# Patient Record
Sex: FEMALE | Race: WHITE | NOT HISPANIC OR LATINO | Employment: FULL TIME | URBAN - METROPOLITAN AREA
[De-identification: names, ages, dates, MRNs, and addresses within clinical notes are randomized per-mention and may not be internally consistent; named-entity substitution may affect disease eponyms.]

---

## 2017-10-29 ENCOUNTER — APPOINTMENT (OUTPATIENT)
Dept: MRI IMAGING | Facility: HOSPITAL | Age: 42
End: 2017-10-29

## 2017-10-29 ENCOUNTER — APPOINTMENT (OUTPATIENT)
Dept: GENERAL RADIOLOGY | Facility: HOSPITAL | Age: 42
End: 2017-10-29

## 2017-10-29 ENCOUNTER — HOSPITAL ENCOUNTER (INPATIENT)
Facility: HOSPITAL | Age: 42
LOS: 5 days | Discharge: HOME | End: 2017-11-03
Attending: FAMILY MEDICINE | Admitting: INTERNAL MEDICINE

## 2017-10-29 ENCOUNTER — APPOINTMENT (OUTPATIENT)
Dept: CARDIOLOGY | Facility: HOSPITAL | Age: 42
End: 2017-10-29
Attending: PSYCHIATRY & NEUROLOGY

## 2017-10-29 ENCOUNTER — APPOINTMENT (OUTPATIENT)
Dept: CT IMAGING | Facility: HOSPITAL | Age: 42
End: 2017-10-29

## 2017-10-29 DIAGNOSIS — I63.9 ACUTE CVA (CEREBROVASCULAR ACCIDENT) (HCC): ICD-10-CM

## 2017-10-29 DIAGNOSIS — I67.1 INTRACRANIAL ANEURYSM: Primary | ICD-10-CM

## 2017-10-29 PROBLEM — I63.512 LEFT MIDDLE CEREBRAL ARTERY STROKE: Status: ACTIVE | Noted: 2017-10-29

## 2017-10-29 LAB
ABO GROUP BLD: NORMAL
ALBUMIN SERPL-MCNC: 4.5 G/DL (ref 3.5–5.2)
ALBUMIN/GLOB SERPL: 1.6 G/DL
ALP SERPL-CCNC: 40 U/L (ref 39–117)
ALT SERPL W P-5'-P-CCNC: 10 U/L (ref 1–33)
ANION GAP SERPL CALCULATED.3IONS-SCNC: 14 MMOL/L
ANISOCYTOSIS BLD QL: NORMAL
APTT PPP: 23.4 SECONDS (ref 22.7–35.4)
AST SERPL-CCNC: 17 U/L (ref 1–32)
BASOPHILS # BLD AUTO: 0.03 10*3/MM3 (ref 0–0.2)
BASOPHILS NFR BLD AUTO: 0.4 % (ref 0–1.5)
BILIRUB SERPL-MCNC: 0.5 MG/DL (ref 0.1–1.2)
BLD GP AB SCN SERPL QL: NEGATIVE
BUN BLD-MCNC: 9 MG/DL (ref 6–20)
BUN/CREAT SERPL: 13 (ref 7–25)
CALCIUM SPEC-SCNC: 9.3 MG/DL (ref 8.6–10.5)
CHLORIDE SERPL-SCNC: 103 MMOL/L (ref 98–107)
CHOLEST SERPL-MCNC: 179 MG/DL (ref 0–200)
CO2 SERPL-SCNC: 21 MMOL/L (ref 22–29)
CREAT BLD-MCNC: 0.69 MG/DL (ref 0.57–1)
DEPRECATED RDW RBC AUTO: 47.8 FL (ref 37–54)
ELLIPTOCYTES BLD QL SMEAR: NORMAL
EOSINOPHIL # BLD AUTO: 0.05 10*3/MM3 (ref 0–0.7)
EOSINOPHIL NFR BLD AUTO: 0.7 % (ref 0.3–6.2)
ERYTHROCYTE [DISTWIDTH] IN BLOOD BY AUTOMATED COUNT: 18.1 % (ref 11.7–13)
GFR SERPL CREATININE-BSD FRML MDRD: 93 ML/MIN/1.73
GLOBULIN UR ELPH-MCNC: 2.8 GM/DL
GLUCOSE BLD-MCNC: 113 MG/DL (ref 65–99)
GLUCOSE BLDC GLUCOMTR-MCNC: 86 MG/DL (ref 70–130)
GLUCOSE BLDC GLUCOMTR-MCNC: 96 MG/DL (ref 70–130)
HBA1C MFR BLD: 4.47 % (ref 4.8–5.6)
HCT VFR BLD AUTO: 30.9 % (ref 35.6–45.5)
HDLC SERPL-MCNC: 94 MG/DL (ref 40–60)
HGB BLD-MCNC: 8.9 G/DL (ref 11.9–15.5)
HOLD SPECIMEN: NORMAL
HOLD SPECIMEN: NORMAL
HYPOCHROMIA BLD QL: NORMAL
IMM GRANULOCYTES # BLD: 0 10*3/MM3 (ref 0–0.03)
IMM GRANULOCYTES NFR BLD: 0 % (ref 0–0.5)
INR PPP: 1.06 (ref 0.9–1.1)
LARGE PLATELETS: NORMAL
LDLC SERPL CALC-MCNC: 72 MG/DL (ref 0–100)
LDLC/HDLC SERPL: 0.77 {RATIO}
LYMPHOCYTES # BLD AUTO: 2.23 10*3/MM3 (ref 0.9–4.8)
LYMPHOCYTES NFR BLD AUTO: 31.4 % (ref 19.6–45.3)
MCH RBC QN AUTO: 20.7 PG (ref 26.9–32)
MCHC RBC AUTO-ENTMCNC: 28.8 G/DL (ref 32.4–36.3)
MCV RBC AUTO: 71.9 FL (ref 80.5–98.2)
MONOCYTES # BLD AUTO: 0.61 10*3/MM3 (ref 0.2–1.2)
MONOCYTES NFR BLD AUTO: 8.6 % (ref 5–12)
NEUTROPHILS # BLD AUTO: 4.19 10*3/MM3 (ref 1.9–8.1)
NEUTROPHILS NFR BLD AUTO: 58.9 % (ref 42.7–76)
PLATELET # BLD AUTO: 395 10*3/MM3 (ref 140–500)
PMV BLD AUTO: 10.5 FL (ref 6–12)
POIKILOCYTOSIS BLD QL SMEAR: NORMAL
POTASSIUM BLD-SCNC: 3.5 MMOL/L (ref 3.5–5.2)
PROT SERPL-MCNC: 7.3 G/DL (ref 6–8.5)
PROTHROMBIN TIME: 13.4 SECONDS (ref 11.7–14.2)
RBC # BLD AUTO: 4.3 10*6/MM3 (ref 3.9–5.2)
RH BLD: POSITIVE
SCHISTOCYTES BLD QL SMEAR: NORMAL
SODIUM BLD-SCNC: 138 MMOL/L (ref 136–145)
STOMATOCYTES BLD QL SMEAR: NORMAL
TRIGL SERPL-MCNC: 64 MG/DL (ref 0–150)
TROPONIN T SERPL-MCNC: <0.01 NG/ML (ref 0–0.03)
TSH SERPL DL<=0.05 MIU/L-ACNC: 1.8 MIU/ML (ref 0.27–4.2)
VIT B12 BLD-MCNC: 189 PG/ML (ref 211–946)
VLDLC SERPL-MCNC: 12.8 MG/DL (ref 5–40)
WBC MORPH BLD: NORMAL
WBC NRBC COR # BLD: 7.11 10*3/MM3 (ref 4.5–10.7)
WHOLE BLOOD HOLD SPECIMEN: NORMAL
WHOLE BLOOD HOLD SPECIMEN: NORMAL

## 2017-10-29 PROCEDURE — 0399T ADULT TRANSTHORACIC ECHO COMPLETE W/ CONT IF NECESSARY PER PROTOCOL: CPT | Performed by: INTERNAL MEDICINE

## 2017-10-29 PROCEDURE — A9577 INJ MULTIHANCE: HCPCS | Performed by: INTERNAL MEDICINE

## 2017-10-29 PROCEDURE — 70450 CT HEAD/BRAIN W/O DYE: CPT

## 2017-10-29 PROCEDURE — 0 GADOBENATE DIMEGLUMINE 529 MG/ML SOLUTION: Performed by: INTERNAL MEDICINE

## 2017-10-29 PROCEDURE — 93306 TTE W/DOPPLER COMPLETE: CPT | Performed by: INTERNAL MEDICINE

## 2017-10-29 PROCEDURE — 83036 HEMOGLOBIN GLYCOSYLATED A1C: CPT | Performed by: PSYCHIATRY & NEUROLOGY

## 2017-10-29 PROCEDURE — 93010 ELECTROCARDIOGRAM REPORT: CPT | Performed by: INTERNAL MEDICINE

## 2017-10-29 PROCEDURE — 71010 HC CHEST PA OR AP: CPT

## 2017-10-29 PROCEDURE — 85007 BL SMEAR W/DIFF WBC COUNT: CPT | Performed by: FAMILY MEDICINE

## 2017-10-29 PROCEDURE — 80053 COMPREHEN METABOLIC PANEL: CPT | Performed by: FAMILY MEDICINE

## 2017-10-29 PROCEDURE — 85730 THROMBOPLASTIN TIME PARTIAL: CPT | Performed by: FAMILY MEDICINE

## 2017-10-29 PROCEDURE — 70544 MR ANGIOGRAPHY HEAD W/O DYE: CPT

## 2017-10-29 PROCEDURE — 85025 COMPLETE CBC W/AUTO DIFF WBC: CPT | Performed by: FAMILY MEDICINE

## 2017-10-29 PROCEDURE — 99233 SBSQ HOSP IP/OBS HIGH 50: CPT | Performed by: NEUROLOGICAL SURGERY

## 2017-10-29 PROCEDURE — 82607 VITAMIN B-12: CPT | Performed by: PSYCHIATRY & NEUROLOGY

## 2017-10-29 PROCEDURE — 99284 EMERGENCY DEPT VISIT MOD MDM: CPT

## 2017-10-29 PROCEDURE — 0399T HC MYOCARDL STRAIN IMAG QUAN ASSMT PER SESS: CPT

## 2017-10-29 PROCEDURE — 70553 MRI BRAIN STEM W/O & W/DYE: CPT

## 2017-10-29 PROCEDURE — 82962 GLUCOSE BLOOD TEST: CPT

## 2017-10-29 PROCEDURE — 84484 ASSAY OF TROPONIN QUANT: CPT | Performed by: FAMILY MEDICINE

## 2017-10-29 PROCEDURE — 80061 LIPID PANEL: CPT | Performed by: PSYCHIATRY & NEUROLOGY

## 2017-10-29 PROCEDURE — 93306 TTE W/DOPPLER COMPLETE: CPT

## 2017-10-29 PROCEDURE — 70549 MR ANGIOGRAPH NECK W/O&W/DYE: CPT

## 2017-10-29 PROCEDURE — 25010000002 CYANOCOBALAMIN PER 1000 MCG: Performed by: PSYCHIATRY & NEUROLOGY

## 2017-10-29 PROCEDURE — 86900 BLOOD TYPING SEROLOGIC ABO: CPT | Performed by: FAMILY MEDICINE

## 2017-10-29 PROCEDURE — 99233 SBSQ HOSP IP/OBS HIGH 50: CPT | Performed by: PSYCHIATRY & NEUROLOGY

## 2017-10-29 PROCEDURE — 84443 ASSAY THYROID STIM HORMONE: CPT | Performed by: PSYCHIATRY & NEUROLOGY

## 2017-10-29 PROCEDURE — 85610 PROTHROMBIN TIME: CPT | Performed by: FAMILY MEDICINE

## 2017-10-29 PROCEDURE — 93005 ELECTROCARDIOGRAM TRACING: CPT | Performed by: FAMILY MEDICINE

## 2017-10-29 PROCEDURE — 86901 BLOOD TYPING SEROLOGIC RH(D): CPT | Performed by: FAMILY MEDICINE

## 2017-10-29 PROCEDURE — 25810000003 SODIUM CHLORIDE 0.9 % WITH KCL 20 MEQ 20-0.9 MEQ/L-% SOLUTION: Performed by: PSYCHIATRY & NEUROLOGY

## 2017-10-29 PROCEDURE — 86850 RBC ANTIBODY SCREEN: CPT | Performed by: FAMILY MEDICINE

## 2017-10-29 RX ORDER — ACETAMINOPHEN 325 MG/1
650 TABLET ORAL EVERY 4 HOURS PRN
Status: DISCONTINUED | OUTPATIENT
Start: 2017-10-29 | End: 2017-11-03 | Stop reason: HOSPADM

## 2017-10-29 RX ORDER — CLOPIDOGREL BISULFATE 75 MG/1
75 TABLET ORAL DAILY
Status: DISCONTINUED | OUTPATIENT
Start: 2017-10-30 | End: 2017-11-03

## 2017-10-29 RX ORDER — CYANOCOBALAMIN 1000 UG/ML
1000 INJECTION, SOLUTION INTRAMUSCULAR; SUBCUTANEOUS ONCE
Status: COMPLETED | OUTPATIENT
Start: 2017-10-29 | End: 2017-10-29

## 2017-10-29 RX ORDER — SODIUM CHLORIDE AND POTASSIUM CHLORIDE 150; 900 MG/100ML; MG/100ML
75 INJECTION, SOLUTION INTRAVENOUS CONTINUOUS
Status: DISCONTINUED | OUTPATIENT
Start: 2017-10-29 | End: 2017-10-30

## 2017-10-29 RX ORDER — CHOLECALCIFEROL (VITAMIN D3) 125 MCG
1000 CAPSULE ORAL DAILY
Status: DISCONTINUED | OUTPATIENT
Start: 2017-10-29 | End: 2017-11-03 | Stop reason: HOSPADM

## 2017-10-29 RX ORDER — CETIRIZINE HYDROCHLORIDE 10 MG/1
10 TABLET ORAL DAILY PRN
COMMUNITY

## 2017-10-29 RX ORDER — TOPIRAMATE 25 MG/1
25 TABLET ORAL NIGHTLY
Status: DISCONTINUED | OUTPATIENT
Start: 2017-10-29 | End: 2017-11-03 | Stop reason: HOSPADM

## 2017-10-29 RX ORDER — ASPIRIN 325 MG
TABLET ORAL
Status: COMPLETED
Start: 2017-10-29 | End: 2017-10-29

## 2017-10-29 RX ORDER — CLONAZEPAM 0.5 MG/1
0.5 TABLET ORAL 2 TIMES DAILY PRN
Status: DISCONTINUED | OUTPATIENT
Start: 2017-10-29 | End: 2017-11-03 | Stop reason: HOSPADM

## 2017-10-29 RX ORDER — CLOPIDOGREL BISULFATE 75 MG/1
150 TABLET ORAL ONCE
Status: COMPLETED | OUTPATIENT
Start: 2017-10-29 | End: 2017-10-29

## 2017-10-29 RX ORDER — DOCUSATE SODIUM 100 MG/1
100 CAPSULE, LIQUID FILLED ORAL 2 TIMES DAILY
Status: DISCONTINUED | OUTPATIENT
Start: 2017-10-29 | End: 2017-11-03 | Stop reason: HOSPADM

## 2017-10-29 RX ORDER — ATORVASTATIN CALCIUM 10 MG/1
10 TABLET, FILM COATED ORAL NIGHTLY
Status: DISCONTINUED | OUTPATIENT
Start: 2017-10-29 | End: 2017-10-30

## 2017-10-29 RX ORDER — SODIUM CHLORIDE 0.9 % (FLUSH) 0.9 %
1-10 SYRINGE (ML) INJECTION AS NEEDED
Status: DISCONTINUED | OUTPATIENT
Start: 2017-10-29 | End: 2017-10-30

## 2017-10-29 RX ORDER — ASPIRIN 81 MG/1
81 TABLET, CHEWABLE ORAL DAILY
Status: DISCONTINUED | OUTPATIENT
Start: 2017-10-29 | End: 2017-11-03

## 2017-10-29 RX ORDER — SODIUM CHLORIDE 0.9 % (FLUSH) 0.9 %
1-10 SYRINGE (ML) INJECTION AS NEEDED
Status: DISCONTINUED | OUTPATIENT
Start: 2017-10-29 | End: 2017-11-03 | Stop reason: HOSPADM

## 2017-10-29 RX ORDER — ASPIRIN 325 MG
325 TABLET ORAL ONCE
Status: COMPLETED | OUTPATIENT
Start: 2017-10-29 | End: 2017-10-29

## 2017-10-29 RX ORDER — ACETAMINOPHEN 650 MG/1
650 SUPPOSITORY RECTAL EVERY 4 HOURS PRN
Status: DISCONTINUED | OUTPATIENT
Start: 2017-10-29 | End: 2017-11-03 | Stop reason: HOSPADM

## 2017-10-29 RX ORDER — SODIUM CHLORIDE 0.9 % (FLUSH) 0.9 %
10 SYRINGE (ML) INJECTION AS NEEDED
Status: DISCONTINUED | OUTPATIENT
Start: 2017-10-29 | End: 2017-10-30

## 2017-10-29 RX ORDER — SODIUM CHLORIDE 9 MG/ML
75 INJECTION, SOLUTION INTRAVENOUS CONTINUOUS
Status: DISCONTINUED | OUTPATIENT
Start: 2017-10-29 | End: 2017-10-29

## 2017-10-29 RX ORDER — ONDANSETRON 2 MG/ML
4 INJECTION INTRAMUSCULAR; INTRAVENOUS EVERY 6 HOURS PRN
Status: DISCONTINUED | OUTPATIENT
Start: 2017-10-29 | End: 2017-11-03 | Stop reason: HOSPADM

## 2017-10-29 RX ADMIN — ATORVASTATIN CALCIUM 10 MG: 10 TABLET, FILM COATED ORAL at 22:06

## 2017-10-29 RX ADMIN — TOPIRAMATE 25 MG: 25 TABLET, FILM COATED ORAL at 22:06

## 2017-10-29 RX ADMIN — ASPIRIN 325 MG: 325 TABLET ORAL at 13:07

## 2017-10-29 RX ADMIN — CLONAZEPAM 0.5 MG: 0.5 TABLET ORAL at 22:06

## 2017-10-29 RX ADMIN — CYANOCOBALAMIN TAB 500 MCG 1000 MCG: 500 TAB at 15:43

## 2017-10-29 RX ADMIN — CYANOCOBALAMIN 1000 MCG: 1000 INJECTION, SOLUTION INTRAMUSCULAR at 15:42

## 2017-10-29 RX ADMIN — Medication 325 MG: at 13:07

## 2017-10-29 RX ADMIN — GADOBENATE DIMEGLUMINE 17 ML: 529 INJECTION, SOLUTION INTRAVENOUS at 14:47

## 2017-10-29 RX ADMIN — POTASSIUM CHLORIDE AND SODIUM CHLORIDE 75 ML/HR: 900; 150 INJECTION, SOLUTION INTRAVENOUS at 16:21

## 2017-10-29 RX ADMIN — DOCUSATE SODIUM 100 MG: 100 CAPSULE, LIQUID FILLED ORAL at 22:06

## 2017-10-29 RX ADMIN — CLOPIDOGREL 150 MG: 75 TABLET, FILM COATED ORAL at 17:19

## 2017-10-30 LAB
ANION GAP SERPL CALCULATED.3IONS-SCNC: 13.6 MMOL/L
BH CV ECHO MEAS - ACS: 2.1 CM
BH CV ECHO MEAS - AO MAX PG (FULL): 4.5 MMHG
BH CV ECHO MEAS - AO MAX PG: 10.5 MMHG
BH CV ECHO MEAS - AO MEAN PG (FULL): 4 MMHG
BH CV ECHO MEAS - AO MEAN PG: 6 MMHG
BH CV ECHO MEAS - AO ROOT AREA (BSA CORRECTED): 1.8
BH CV ECHO MEAS - AO ROOT AREA: 9.1 CM^2
BH CV ECHO MEAS - AO ROOT DIAM: 3.4 CM
BH CV ECHO MEAS - AO V2 MAX: 162 CM/SEC
BH CV ECHO MEAS - AO V2 MEAN: 115 CM/SEC
BH CV ECHO MEAS - AO V2 VTI: 30.6 CM
BH CV ECHO MEAS - AVA(I,A): 3 CM^2
BH CV ECHO MEAS - AVA(I,D): 3 CM^2
BH CV ECHO MEAS - AVA(V,A): 2.9 CM^2
BH CV ECHO MEAS - AVA(V,D): 2.9 CM^2
BH CV ECHO MEAS - BSA(HAYCOCK): 2 M^2
BH CV ECHO MEAS - BSA: 1.9 M^2
BH CV ECHO MEAS - BZI_BMI: 29.1 KILOGRAMS/M^2
BH CV ECHO MEAS - BZI_METRIC_HEIGHT: 167.6 CM
BH CV ECHO MEAS - BZI_METRIC_WEIGHT: 81.6 KG
BH CV ECHO MEAS - CONTRAST EF (2CH): 59.1 ML/M^2
BH CV ECHO MEAS - CONTRAST EF 4CH: 70.3 ML/M^2
BH CV ECHO MEAS - EDV(CUBED): 148.9 ML
BH CV ECHO MEAS - EDV(MOD-SP2): 93 ML
BH CV ECHO MEAS - EDV(MOD-SP4): 91 ML
BH CV ECHO MEAS - EDV(TEICH): 135.3 ML
BH CV ECHO MEAS - EF(CUBED): 80 %
BH CV ECHO MEAS - EF(MOD-SP2): 59.1 %
BH CV ECHO MEAS - EF(MOD-SP4): 70.3 %
BH CV ECHO MEAS - EF(TEICH): 72 %
BH CV ECHO MEAS - ESV(CUBED): 29.8 ML
BH CV ECHO MEAS - ESV(MOD-SP2): 38 ML
BH CV ECHO MEAS - ESV(MOD-SP4): 27 ML
BH CV ECHO MEAS - ESV(TEICH): 37.9 ML
BH CV ECHO MEAS - FS: 41.5 %
BH CV ECHO MEAS - IVS/LVPW: 1.3
BH CV ECHO MEAS - IVSD: 0.9 CM
BH CV ECHO MEAS - LAT PEAK E' VEL: 11 CM/SEC
BH CV ECHO MEAS - LV DIASTOLIC VOL/BSA (35-75): 47.6 ML/M^2
BH CV ECHO MEAS - LV MASS(C)D: 150.1 GRAMS
BH CV ECHO MEAS - LV MASS(C)DI: 78.5 GRAMS/M^2
BH CV ECHO MEAS - LV MAX PG: 6 MMHG
BH CV ECHO MEAS - LV MEAN PG: 2 MMHG
BH CV ECHO MEAS - LV SYSTOLIC VOL/BSA (12-30): 14.1 ML/M^2
BH CV ECHO MEAS - LV V1 MAX: 122 CM/SEC
BH CV ECHO MEAS - LV V1 MEAN: 71.9 CM/SEC
BH CV ECHO MEAS - LV V1 VTI: 24.2 CM
BH CV ECHO MEAS - LVIDD: 5.3 CM
BH CV ECHO MEAS - LVIDS: 3.1 CM
BH CV ECHO MEAS - LVLD AP2: 8.8 CM
BH CV ECHO MEAS - LVLD AP4: 8.3 CM
BH CV ECHO MEAS - LVLS AP2: 7.1 CM
BH CV ECHO MEAS - LVLS AP4: 6.2 CM
BH CV ECHO MEAS - LVOT AREA (M): 3.8 CM^2
BH CV ECHO MEAS - LVOT AREA: 3.8 CM^2
BH CV ECHO MEAS - LVOT DIAM: 2.2 CM
BH CV ECHO MEAS - LVPWD: 0.7 CM
BH CV ECHO MEAS - MED PEAK E' VEL: 9 CM/SEC
BH CV ECHO MEAS - MV A DUR: 0.15 SEC
BH CV ECHO MEAS - MV A MAX VEL: 91.2 CM/SEC
BH CV ECHO MEAS - MV DEC SLOPE: 730 CM/SEC^2
BH CV ECHO MEAS - MV DEC TIME: 0.21 SEC
BH CV ECHO MEAS - MV E MAX VEL: 105 CM/SEC
BH CV ECHO MEAS - MV E/A: 1.2
BH CV ECHO MEAS - MV MAX PG: 7.2 MMHG
BH CV ECHO MEAS - MV MEAN PG: 3 MMHG
BH CV ECHO MEAS - MV P1/2T MAX VEL: 135 CM/SEC
BH CV ECHO MEAS - MV P1/2T: 54.2 MSEC
BH CV ECHO MEAS - MV V2 MAX: 134 CM/SEC
BH CV ECHO MEAS - MV V2 MEAN: 74.3 CM/SEC
BH CV ECHO MEAS - MV V2 VTI: 34.6 CM
BH CV ECHO MEAS - MVA P1/2T LCG: 1.6 CM^2
BH CV ECHO MEAS - MVA(P1/2T): 4.1 CM^2
BH CV ECHO MEAS - MVA(VTI): 2.7 CM^2
BH CV ECHO MEAS - PA ACC TIME: 0.13 SEC
BH CV ECHO MEAS - PA MAX PG (FULL): 3.2 MMHG
BH CV ECHO MEAS - PA MAX PG: 5 MMHG
BH CV ECHO MEAS - PA PR(ACCEL): 20.5 MMHG
BH CV ECHO MEAS - PA V2 MAX: 112 CM/SEC
BH CV ECHO MEAS - PULM A REVS DUR: 0.15 SEC
BH CV ECHO MEAS - PULM A REVS VEL: 43.2 CM/SEC
BH CV ECHO MEAS - PULM DIAS VEL: 58.7 CM/SEC
BH CV ECHO MEAS - PULM S/D: 1.2
BH CV ECHO MEAS - PULM SYS VEL: 68.4 CM/SEC
BH CV ECHO MEAS - PVA(V,A): 2.3 CM^2
BH CV ECHO MEAS - PVA(V,D): 2.3 CM^2
BH CV ECHO MEAS - QP/QS: 0.56
BH CV ECHO MEAS - RAP SYSTOLE: 3 MMHG
BH CV ECHO MEAS - RV MAX PG: 1.8 MMHG
BH CV ECHO MEAS - RV MEAN PG: 1 MMHG
BH CV ECHO MEAS - RV V1 MAX: 68 CM/SEC
BH CV ECHO MEAS - RV V1 MEAN: 44.2 CM/SEC
BH CV ECHO MEAS - RV V1 VTI: 13.6 CM
BH CV ECHO MEAS - RVOT AREA: 3.8 CM^2
BH CV ECHO MEAS - RVOT DIAM: 2.2 CM
BH CV ECHO MEAS - RVSP: 26 MMHG
BH CV ECHO MEAS - SI(AO): 145.3 ML/M^2
BH CV ECHO MEAS - SI(CUBED): 62.3 ML/M^2
BH CV ECHO MEAS - SI(LVOT): 48.1 ML/M^2
BH CV ECHO MEAS - SI(MOD-SP2): 28.8 ML/M^2
BH CV ECHO MEAS - SI(MOD-SP4): 33.5 ML/M^2
BH CV ECHO MEAS - SI(TEICH): 50.9 ML/M^2
BH CV ECHO MEAS - SV(AO): 277.8 ML
BH CV ECHO MEAS - SV(CUBED): 119.1 ML
BH CV ECHO MEAS - SV(LVOT): 92 ML
BH CV ECHO MEAS - SV(MOD-SP2): 55 ML
BH CV ECHO MEAS - SV(MOD-SP4): 64 ML
BH CV ECHO MEAS - SV(RVOT): 51.7 ML
BH CV ECHO MEAS - SV(TEICH): 97.4 ML
BH CV ECHO MEAS - TAPSE (>1.6): 2.6 CM2
BH CV ECHO MEAS - TR MAX VEL: 238 CM/SEC
BH CV VAS BP RIGHT ARM: NORMAL MMHG
BH CV XLRA - RV BASE: 3.8 CM
BH CV XLRA - TDI S': 17 CM/SEC
BUN BLD-MCNC: 8 MG/DL (ref 6–20)
BUN/CREAT SERPL: 12.1 (ref 7–25)
CALCIUM SPEC-SCNC: 8.5 MG/DL (ref 8.6–10.5)
CHLORIDE SERPL-SCNC: 107 MMOL/L (ref 98–107)
CO2 SERPL-SCNC: 18.4 MMOL/L (ref 22–29)
CREAT BLD-MCNC: 0.66 MG/DL (ref 0.57–1)
DEPRECATED RDW RBC AUTO: 49.3 FL (ref 37–54)
E/E' RATIO: 11
ERYTHROCYTE [DISTWIDTH] IN BLOOD BY AUTOMATED COUNT: 18.2 % (ref 11.7–13)
GFR SERPL CREATININE-BSD FRML MDRD: 98 ML/MIN/1.73
GLUCOSE BLD-MCNC: 85 MG/DL (ref 65–99)
GLUCOSE BLDC GLUCOMTR-MCNC: 90 MG/DL (ref 70–130)
GLUCOSE BLDC GLUCOMTR-MCNC: 91 MG/DL (ref 70–130)
GLUCOSE BLDC GLUCOMTR-MCNC: 91 MG/DL (ref 70–130)
HCT VFR BLD AUTO: 26.6 % (ref 35.6–45.5)
HGB BLD-MCNC: 7.4 G/DL (ref 11.9–15.5)
LEFT ATRIUM VOLUME INDEX: 24 ML/M2
LEFT ATRIUM VOLUME: 42 CM3
MCH RBC QN AUTO: 20.7 PG (ref 26.9–32)
MCHC RBC AUTO-ENTMCNC: 27.8 G/DL (ref 32.4–36.3)
MCV RBC AUTO: 74.5 FL (ref 80.5–98.2)
PLATELET # BLD AUTO: 372 10*3/MM3 (ref 140–500)
PMV BLD AUTO: 10.4 FL (ref 6–12)
POTASSIUM BLD-SCNC: 4.3 MMOL/L (ref 3.5–5.2)
RBC # BLD AUTO: 3.57 10*6/MM3 (ref 3.9–5.2)
SODIUM BLD-SCNC: 139 MMOL/L (ref 136–145)
WBC NRBC COR # BLD: 6.16 10*3/MM3 (ref 4.5–10.7)

## 2017-10-30 PROCEDURE — 99231 SBSQ HOSP IP/OBS SF/LOW 25: CPT | Performed by: NEUROLOGICAL SURGERY

## 2017-10-30 PROCEDURE — 80048 BASIC METABOLIC PNL TOTAL CA: CPT | Performed by: INTERNAL MEDICINE

## 2017-10-30 PROCEDURE — 97161 PT EVAL LOW COMPLEX 20 MIN: CPT

## 2017-10-30 PROCEDURE — 99254 IP/OBS CNSLTJ NEW/EST MOD 60: CPT | Performed by: INTERNAL MEDICINE

## 2017-10-30 PROCEDURE — 82962 GLUCOSE BLOOD TEST: CPT

## 2017-10-30 PROCEDURE — 97110 THERAPEUTIC EXERCISES: CPT

## 2017-10-30 PROCEDURE — 99232 SBSQ HOSP IP/OBS MODERATE 35: CPT | Performed by: NURSE PRACTITIONER

## 2017-10-30 PROCEDURE — 85027 COMPLETE CBC AUTOMATED: CPT | Performed by: INTERNAL MEDICINE

## 2017-10-30 RX ORDER — ATORVASTATIN CALCIUM 20 MG/1
40 TABLET, FILM COATED ORAL NIGHTLY
Status: DISCONTINUED | OUTPATIENT
Start: 2017-10-30 | End: 2017-11-03 | Stop reason: HOSPADM

## 2017-10-30 RX ADMIN — ASPIRIN 81 MG: 81 TABLET, CHEWABLE ORAL at 08:30

## 2017-10-30 RX ADMIN — CYANOCOBALAMIN TAB 500 MCG 1000 MCG: 500 TAB at 08:30

## 2017-10-30 RX ADMIN — DOCUSATE SODIUM 100 MG: 100 CAPSULE, LIQUID FILLED ORAL at 17:43

## 2017-10-30 RX ADMIN — CLOPIDOGREL 75 MG: 75 TABLET, FILM COATED ORAL at 08:30

## 2017-10-30 RX ADMIN — TOPIRAMATE 25 MG: 25 TABLET, FILM COATED ORAL at 20:36

## 2017-10-30 RX ADMIN — CLONAZEPAM 0.5 MG: 0.5 TABLET ORAL at 20:36

## 2017-10-30 RX ADMIN — ATORVASTATIN CALCIUM 40 MG: 20 TABLET, FILM COATED ORAL at 20:36

## 2017-10-31 LAB
CLOSURE TME COLL+ADP BLD: 67 SECONDS (ref 52–122)
CLOSURE TME COLL+EPINEP BLD: 144 SECONDS (ref 68–148)
HCT VFR BLD AUTO: 30.1 % (ref 35.6–45.5)
HGB BLD-MCNC: 8.5 G/DL (ref 11.9–15.5)
PA ADP PRP-ACNC: 280 PRU (ref 194–418)

## 2017-10-31 PROCEDURE — 85576 BLOOD PLATELET AGGREGATION: CPT | Performed by: NEUROLOGICAL SURGERY

## 2017-10-31 PROCEDURE — 99232 SBSQ HOSP IP/OBS MODERATE 35: CPT | Performed by: NEUROLOGICAL SURGERY

## 2017-10-31 PROCEDURE — 85018 HEMOGLOBIN: CPT | Performed by: INTERNAL MEDICINE

## 2017-10-31 PROCEDURE — 85014 HEMATOCRIT: CPT | Performed by: INTERNAL MEDICINE

## 2017-10-31 RX ADMIN — DOCUSATE SODIUM 100 MG: 100 CAPSULE, LIQUID FILLED ORAL at 18:22

## 2017-10-31 RX ADMIN — DOCUSATE SODIUM 100 MG: 100 CAPSULE, LIQUID FILLED ORAL at 08:03

## 2017-10-31 RX ADMIN — ATORVASTATIN CALCIUM 40 MG: 20 TABLET, FILM COATED ORAL at 20:41

## 2017-10-31 RX ADMIN — TOPIRAMATE 25 MG: 25 TABLET, FILM COATED ORAL at 20:41

## 2017-10-31 RX ADMIN — CLONAZEPAM 0.5 MG: 0.5 TABLET ORAL at 20:41

## 2017-10-31 RX ADMIN — CLOPIDOGREL 75 MG: 75 TABLET, FILM COATED ORAL at 08:03

## 2017-10-31 RX ADMIN — CYANOCOBALAMIN TAB 500 MCG 1000 MCG: 500 TAB at 08:03

## 2017-10-31 RX ADMIN — ASPIRIN 81 MG: 81 TABLET, CHEWABLE ORAL at 08:03

## 2017-11-01 LAB
ALBUMIN SERPL-MCNC: 3.7 G/DL (ref 3.5–5.2)
ALBUMIN/GLOB SERPL: 1.5 G/DL
ALP SERPL-CCNC: 32 U/L (ref 39–117)
ALT SERPL W P-5'-P-CCNC: 7 U/L (ref 1–33)
ANION GAP SERPL CALCULATED.3IONS-SCNC: 14.6 MMOL/L
AST SERPL-CCNC: 13 U/L (ref 1–32)
BILIRUB SERPL-MCNC: 0.3 MG/DL (ref 0.1–1.2)
BUN BLD-MCNC: 9 MG/DL (ref 6–20)
BUN/CREAT SERPL: 13.8 (ref 7–25)
CALCIUM SPEC-SCNC: 8.5 MG/DL (ref 8.6–10.5)
CHLORIDE SERPL-SCNC: 109 MMOL/L (ref 98–107)
CO2 SERPL-SCNC: 18.4 MMOL/L (ref 22–29)
CREAT BLD-MCNC: 0.65 MG/DL (ref 0.57–1)
DEPRECATED RDW RBC AUTO: 49.8 FL (ref 37–54)
ERYTHROCYTE [DISTWIDTH] IN BLOOD BY AUTOMATED COUNT: 18.1 % (ref 11.7–13)
GFR SERPL CREATININE-BSD FRML MDRD: 100 ML/MIN/1.73
GLOBULIN UR ELPH-MCNC: 2.4 GM/DL
GLUCOSE BLD-MCNC: 95 MG/DL (ref 65–99)
HCT VFR BLD AUTO: 27.2 % (ref 35.6–45.5)
HGB BLD-MCNC: 7.5 G/DL (ref 11.9–15.5)
MCH RBC QN AUTO: 20.8 PG (ref 26.9–32)
MCHC RBC AUTO-ENTMCNC: 27.6 G/DL (ref 32.4–36.3)
MCV RBC AUTO: 75.3 FL (ref 80.5–98.2)
PLATELET # BLD AUTO: 320 10*3/MM3 (ref 140–500)
PMV BLD AUTO: 11 FL (ref 6–12)
POTASSIUM BLD-SCNC: 3.7 MMOL/L (ref 3.5–5.2)
PROT SERPL-MCNC: 6.1 G/DL (ref 6–8.5)
RBC # BLD AUTO: 3.61 10*6/MM3 (ref 3.9–5.2)
SODIUM BLD-SCNC: 142 MMOL/L (ref 136–145)
WBC NRBC COR # BLD: 6.06 10*3/MM3 (ref 4.5–10.7)

## 2017-11-01 PROCEDURE — 80053 COMPREHEN METABOLIC PANEL: CPT | Performed by: INTERNAL MEDICINE

## 2017-11-01 PROCEDURE — 99233 SBSQ HOSP IP/OBS HIGH 50: CPT | Performed by: NURSE PRACTITIONER

## 2017-11-01 PROCEDURE — 85027 COMPLETE CBC AUTOMATED: CPT | Performed by: INTERNAL MEDICINE

## 2017-11-01 RX ADMIN — DOCUSATE SODIUM 100 MG: 100 CAPSULE, LIQUID FILLED ORAL at 18:00

## 2017-11-01 RX ADMIN — CLOPIDOGREL 75 MG: 75 TABLET, FILM COATED ORAL at 08:38

## 2017-11-01 RX ADMIN — ATORVASTATIN CALCIUM 40 MG: 20 TABLET, FILM COATED ORAL at 23:45

## 2017-11-01 RX ADMIN — DOCUSATE SODIUM 100 MG: 100 CAPSULE, LIQUID FILLED ORAL at 08:38

## 2017-11-01 RX ADMIN — TOPIRAMATE 25 MG: 25 TABLET, FILM COATED ORAL at 23:46

## 2017-11-01 RX ADMIN — CYANOCOBALAMIN TAB 500 MCG 1000 MCG: 500 TAB at 09:21

## 2017-11-01 RX ADMIN — ASPIRIN 81 MG: 81 TABLET, CHEWABLE ORAL at 09:22

## 2017-11-01 NOTE — PLAN OF CARE
Problem: Patient Care Overview (Adult)  Goal: Plan of Care Review  Outcome: Ongoing (interventions implemented as appropriate)    11/01/17 8981   Outcome Evaluation   Outcome Summary/Follow up Plan pt alert and oriented, but understands importance of safety and to call for assistance. slight assymetry remains to rt side with smile. pt has been free of pain and comfortable throughout shift.    Coping/Psychosocial Response Interventions   Plan Of Care Reviewed With patient   Patient Care Overview   Progress progress toward functional goals as expected         Problem: Stroke (Ischemic) (Adult)  Goal: Signs and Symptoms of Listed Potential Problems Will be Absent or Manageable (Stroke)  Outcome: Ongoing (interventions implemented as appropriate)    Problem: Fall Risk (Adult)  Goal: Identify Related Risk Factors and Signs and Symptoms  Outcome: Ongoing (interventions implemented as appropriate)  Goal: Absence of Falls  Outcome: Ongoing (interventions implemented as appropriate)

## 2017-11-01 NOTE — PLAN OF CARE
Problem: Patient Care Overview (Adult)  Goal: Plan of Care Review  Outcome: Ongoing (interventions implemented as appropriate)    11/01/17 0612   Outcome Evaluation   Outcome Summary/Follow up Plan No changes overnight, pt slept easily throughout the night, vitals stable, pt has bed on 5P and will transfer at shift change. DARCIE Stevenson RN   Coping/Psychosocial Response Interventions   Plan Of Care Reviewed With patient   Patient Care Overview   Progress improving         Problem: Stroke (Ischemic) (Adult)  Goal: Signs and Symptoms of Listed Potential Problems Will be Absent or Manageable (Stroke)  Outcome: Ongoing (interventions implemented as appropriate)    11/01/17 0612   Stroke (Ischemic)   Problems Assessed (Stroke (Ischemic)/TIA) all   Problems Present (Stroke (Ischemic)/TIA) none;situational response         Problem: Fall Risk (Adult)  Goal: Identify Related Risk Factors and Signs and Symptoms  Outcome: Ongoing (interventions implemented as appropriate)    11/01/17 0612   Fall Risk   Fall Risk: Related Risk Factors environment unfamiliar   Fall Risk: Signs and Symptoms presence of risk factors       Goal: Absence of Falls  Outcome: Ongoing (interventions implemented as appropriate)    11/01/17 0612   Fall Risk (Adult)   Absence of Falls making progress toward outcome

## 2017-11-01 NOTE — PROGRESS NOTES
[facility] PROGRESS NOTE    PATIENT IDENTIFICATION:   Name:  Mariaelena Mack      MRN:  [id number]     42 y.o.  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    Objective     Vital signs in last 24 hours:  Temp:  [98.2 °F (36.8 °C)-98.7 °F (37.1 °C)] 98.4 °F (36.9 °C)  Heart Rate:  [65-98] 78  Resp:  [14-16] 16  BP: ()/(53-87) 105/73  ICP ranges-    Intake/Output last 3 shifts:       Intake/Output this shift:         Physical Exam:  General:   Awake, alert, and oriented x 3. NAD  Appears well  CN III IV VI: Extraocular movements are full , PERRL   CN V: Normal facial sensation and strength of muscles of mastication.  CN VII: Facial movements appear symmetric. No weakness.    Motor: Normal muscle strength, bulk and tone in upper and lower extremities.  No fasciculations, rigidity, spasticity, or abnormal movements.  Reflexes: 2+ in the upper and lower extremities. Plantar responses are flexor.  Sensation: Normal to light touch, pinprick, vibration, temperature, and proprioception in arms and legs. Normal graphesthesia and no extinction on DSS.  Station and Gait: Normal gait and station.    Coordination: Finger to nose test shows no dysmetria.  Rapid alternating movements are normal.  Heel to shin normal.  Extremities:  Patient is wearing GUSTAVO and SCD bilaterally    LABS:    Lab Results (last 24 hours)     Procedure Component Value Units Date/Time    P2Y12 Platelet Inhibition [[id number]]  (Normal) Collected:  10/31/17 1403    Specimen:  Blood Updated:  10/31/17 1440     P2Y12 Platelet Inhibition 280 PRU     Narrative:       Test results are in P2Y12 reaction units (PRU). This measures the extent of platelet aggregation in the presence of P2Y12 inhibitor drugs such as clopidrogrel  (Plavix), prasugrel (Effient), ticagrelor (Brilinta), and ticlopidine (Ticlid).   Pre-Drug normal reference range: 194 - 418 PRU   P2Y12 values <194 (low end of reference range) are specific evidence of a P2Y12 inhibitor effect   Patients who have been treated with Glycoprotein IIb/IIIa inhibitors should not be tested until platelet function has recovered. This time period is approximately 14 days after discontinuation of abciximab (ReoPro) and up to 48 hours after discontinuation of eptifibatide (Integrillin) and tirofiban (Aggratstat).   Results should be interpreted in conjuction with other laboratory and clinical data available to the clinician.    CBC (No Diff) [[id number]]  (Abnormal) Collected:  11/01/17 0600    Specimen:  Blood Updated:  11/01/17 0651     WBC 6.06 10*3/mm3      RBC 3.61 (L) 10*6/mm3      Hemoglobin 7.5 (L) g/dL      Hematocrit 27.2 (L) %      MCV 75.3 (L) fL      MCH 20.8 (L) pg      MCHC 27.6 (L) g/dL      RDW 18.1 (H) %      RDW-SD 49.8 fl      MPV 11.0 fL      Platelets 320 10*3/mm3     Comprehensive Metabolic Panel [[id number]]  (Abnormal) Collected:  11/01/17 0600    Specimen:  Blood Updated:  11/01/17 0711     Glucose 95 mg/dL      BUN 9 mg/dL      Creatinine 0.65 mg/dL      Sodium 142 mmol/L      Potassium 3.7 mmol/L      Chloride 109 (H) mmol/L      CO2 18.4 (L) mmol/L      Calcium 8.5 (L) mg/dL      Total Protein 6.1 g/dL      Albumin 3.70 g/dL      ALT (SGPT) 7 U/L      AST (SGOT) 13 U/L      Alkaline Phosphatase 32 (L) U/L      Total Bilirubin 0.3 mg/dL      eGFR Non African Amer 100 mL/min/1.73      Globulin 2.4 gm/dL      A/G Ratio 1.5 g/dL      BUN/Creatinine Ratio 13.8     Anion Gap 14.6 mmol/L           IMAGING STUDIES:    No new imaging      Meds reviewed/changed: Yes    Current Facility-Administered Medications   Medication Dose Route Frequency Provider Last Rate Last Dose   • acetaminophen (TYLENOL) tablet 650 mg  650 mg Oral Q4H PRN Roger E Hodes, MD        Or   •  acetaminophen (TYLENOL) suppository 650 mg  650 mg Rectal Q4H PRN Roger Day MD       • aspirin chewable tablet 81 mg  81 mg Oral Daily Roger Day MD   81 mg at 11/01/17 0922   • atorvastatin (LIPITOR) tablet 40 mg  40 mg Oral Nightly Abena Beth MD   40 mg at 10/31/17 2041   • clonazePAM (KlonoPIN) tablet 0.5 mg  0.5 mg Oral BID PRN Abena Beth MD   0.5 mg at 10/31/17 2041   • clopidogrel (PLAVIX) tablet 75 mg  75 mg Oral Daily Roger Day MD   75 mg at 11/01/17 0838   • docusate sodium (COLACE) capsule 100 mg  100 mg Oral BID Roger Day MD   100 mg at 11/01/17 0838   • Influenza Vac Subunit Quad (FLUCELVAX) injection 0.5 mL  0.5 mL Intramuscular During Hospitalization Ethan Jones MD       • niCARdipine (CARDENE) 25 mg/250 mL (0.1 mg/mL) 0.9% NS VTB infusion  5-15 mg/hr Intravenous Titrated Roger Day MD       • ondansetron (ZOFRAN) injection 4 mg  4 mg Intravenous Q6H PRN Roger Day MD       • pneumococcal polysaccharide 23-valent (PNEUMOVAX-23) vaccine 0.5 mL  0.5 mL Intramuscular During Hospitalization Ethan Jones MD       • sodium chloride 0.9 % flush 1-10 mL  1-10 mL Intravenous PRN Abena Beth MD       • topiramate (TOPAMAX) tablet 25 mg  25 mg Oral Nightly Abena Beth MD   25 mg at 10/31/17 2041   • vitamin B-12 (CYANOCOBALAMIN) tablet 1,000 mcg  1,000 mcg Oral Daily Abena Beth MD   1,000 mcg at 11/01/17 0921       Assessment/Plan     ASSESSMENT:    Principal Problem:    Left middle cerebral artery stroke  Active Problems:    Intracranial aneurysm      PLAN: Dr Day had a long talk with the patient and her family last night. We'll plan for cerebral angiogram tomorrow or Friday. Long talk with the patient and her family regarding specifics of the cerebral angiogram procedure.  She appears to be back at baseline with the exception of minimal right facial and mouth weakness.    I discussed the patients findings and my  recommendations with patient, family, nursing staff and Dr Day       LOS: 3 days       GRIS Witt  11/1/2017  12:04 PM

## 2017-11-01 NOTE — PROGRESS NOTES
"[facility] PUL[facility]ARE         Dr Long Sayied   LOS: 3 days   Patient Care Team:  India Stubbs MD as PCP - General (Family Medicine)  Soraida Jay MD as Consulting Physician (Obstetrics and Gynecology)    Chief Complaint: Left middle cerebral artery stroke in the setting off left ICA partially thrombosed aneurysm.    Interval History: nervous for the surgery and angiogram.  No complaints today.    REVIEW OF SYSTEMS:   CARDIOVASCULAR: No chest pain, chest pressure or chest discomfort. No palpitations or edema.   RESPIRATORY: No shortness of breath, cough or sputum.   GASTROINTESTINAL: No anorexia, nausea, vomiting or diarrhea. No abdominal pain or blood.   HEMATOLOGIC: No bleeding or bruising.     Ventilator/Non-Invasive Ventilation Settings     None            Vital Signs  Temp:  [98 °F (36.7 °C)-98.9 °F (37.2 °C)] 98 °F (36.7 °C)  Heart Rate:  [65-98] 76  Resp:  [14-18] 18  BP: ()/(53-76) 100/60  No intake or output data in the 24 hours ending 11/01/17 1659  Flowsheet Rows         First Filed Value    Admission Height  66\" (167.6 cm) Documented at 10/29/2017 1502    Admission Weight  215 lb 8 oz (97.8 kg) Documented at 10/29/2017 1502          Physical Exam:   General Appearance:    Alert, cooperative, in no acute distress.  No facial asymmetry noted.  Speech appears to be normal.     Lungs:     Clear to auscultation,respirations regular, even and                  unlabored    Heart:    Regular rhythm and normal rate, normal S1 and S2, no            murmur, no gallop, no rub, no click   Chest Wall:    No abnormalities observed   Abdomen:     Normal bowel sounds, no masses, no organomegaly, soft        non-tender, non-distended, no guarding, no rebound                tenderness   Extremities:   Moves all extremities well, no edema, no cyanosis, no             redness     Results Review:          Results from last 7 days  Lab Units 11/01/17  0600 10/30/17  0420 10/29/17  1228   SODIUM mmol/L " 142 139 138   POTASSIUM mmol/L 3.7 4.3 3.5   CHLORIDE mmol/L 109* 107 103   CO2 mmol/L 18.4* 18.4* 21.0*   BUN mg/dL 9 8 9   CREATININE mg/dL 0.65 0.66 0.69   GLUCOSE mg/dL 95 85 113*   CALCIUM mg/dL 8.5* 8.5* 9.3       Results from last 7 days  Lab Units 10/29/17  1228   TROPONIN T ng/mL <0.010       Results from last 7 days  Lab Units 11/01/17  0600 10/31/17  1009 10/30/17  0420 10/29/17  1228   WBC 10*3/mm3 6.06  --  6.16 7.11   HEMOGLOBIN g/dL 7.5* 8.5* 7.4* 8.9*   HEMATOCRIT % 27.2* 30.1* 26.6* 30.9*   PLATELETS 10*3/mm3 320  --  372 395       Results from last 7 days  Lab Units 10/29/17  1228   INR  1.06   APTT seconds 23.4       Results from last 7 days  Lab Units 10/29/17  1228   CHOLESTEROL mg/dL 179           Results from last 7 days  Lab Units 10/29/17  1228   CHOLESTEROL mg/dL 179   TRIGLYCERIDES mg/dL 64   HDL CHOL mg/dL 94*           I reviewed the patient's new clinical results.  I personally viewed and interpreted the patient's CXR        Medication Review:     aspirin 81 mg Oral Daily   atorvastatin 40 mg Oral Nightly   clopidogrel 75 mg Oral Daily   docusate sodium 100 mg Oral BID   topiramate 25 mg Oral Nightly   vitamin B-12 1,000 mcg Oral Daily         niCARdipine 5-15 mg/hr       ASSESSMENT:   Left MCA CVA in the setting off left ICA partially thrombosed aneurysm  History of migraine  History of B12 deficiency  pfo   Anemia likely due to menorrhagia    PLAN:  Stroke symptoms are pretty much resolved with current NIH 0  Continue blood pressure management keep systolic less than 140  Aspirin Plavix started per neurosurgery  Plans per neurosurgery for cerebral angiogram and placement of pipeline stent.  Discussed with patient in detail  We will check iron studies for anemia    Ethan Jones MD  11/01/17  4:59 PM

## 2017-11-01 NOTE — PAYOR COMM NOTE
"UR CONTACT:    HUGH   P:  [phone]  F:  [phone]        Joy Mack (42 y.o. Female)     Date of Birth Social Security Number Address Home Phone MRN    1975  [id number] [facility] [phone] [id number]    [employer] Marital Status          Unknown Unknown       Admission Date Admission Type Admitting Provider Attending Provider Department, Room/Bed    10/29/17 Emergency Ethan Jones MD Sayied, Tausif, MD [address], P576/1    Discharge Date Discharge Disposition Discharge Destination                      Attending Provider: Ethan Jones MD     Allergies:  No Known Allergies    Isolation:  None   Infection:  None   Code Status:  FULL    Ht:  66\" (167.6 cm)   Wt:  215 lb 8 oz (97.8 kg)    Admission Cmt:  None   Principal Problem:  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]                 Active Insurance as of 10/29/2017     Primary Coverage     Payor Plan Insurance Group Employer/Plan Group    [employer] [facility] [employer] [facility] [id number]     Payor Plan Address Payor Plan Phone Number Effective From Effective To    [address] [phone] 1/1/2010     [place] [address]       Subscriber Name Subscriber Birth Date Member ID       JOY MACK 1975 [id number]                 Emergency Contacts      (Rel.) Home Phone Work Phone Mobile Phone    Silvestre Mack (Spouse) -- -- [phone]            Lines, Drains & Airways    Active LDAs     Name:   Placement date:   Placement time:   Site:   Days:    Peripheral IV Line - Single Lumen 10/29/17 1600 metacarpal vein (top of hand), left 20 gauge  10/29/17    1600      2                Hospital Medications (active)       Dose Frequency Start End    acetaminophen (TYLENOL) suppository 650 mg 650 mg Every 4 Hours PRN 10/29/2017     Sig - Route: Insert 1 suppository into the rectum Every 4 (Four) Hours As Needed for Mild Pain  or Fever " "(Temperature greater than or equal to 37 C). - Rectal    Linked Group 1:  \"Or\" Linked Group Details        acetaminophen (TYLENOL) tablet 650 mg 650 mg Every 4 Hours PRN 10/29/2017     Sig - Route: Take 2 tablets by mouth Every 4 (Four) Hours As Needed for Mild Pain  or Fever (Temperature greater than or equal to 37 C). - Oral    Linked Group 1:  \"Or\" Linked Group Details        aspirin chewable tablet 81 mg 81 mg Daily 10/29/2017     Sig - Route: Chew 1 tablet Daily. - Oral    atorvastatin (LIPITOR) tablet 40 mg 40 mg Nightly 10/30/2017     Sig - Route: Take 2 tablets by mouth Every Night. - Oral    clonazePAM (KlonoPIN) tablet 0.5 mg 0.5 mg 2 Times Daily PRN 10/29/2017 11/8/2017    Sig - Route: Take 1 tablet by mouth 2 (Two) Times a Day As Needed (anxiety). - Oral    clopidogrel (PLAVIX) tablet 75 mg 75 mg Daily 10/30/2017     Sig - Route: Take 1 tablet by mouth Daily. - Oral    docusate sodium (COLACE) capsule 100 mg 100 mg 2 Times Daily 10/29/2017     Sig - Route: Take 1 capsule by mouth 2 (Two) Times a Day. - Oral    Influenza Vac Subunit Quad (FLUCELVAX) injection 0.5 mL 0.5 mL During Hospitalization 10/29/2017     Sig - Route: Inject 0.5 mL into the shoulder, thigh, or buttocks During Hospitalization for Immunization. - Intramuscular    niCARdipine (CARDENE) 25 mg/250 mL (0.1 mg/mL) 0.9% NS VTB infusion 5-15 mg/hr Titrated 10/29/2017     Sig - Route: Infuse 5-15 mg/hr into a venous catheter Dose Adjusted By Provider As Needed. - Intravenous    ondansetron (ZOFRAN) injection 4 mg 4 mg Every 6 Hours PRN 10/29/2017     Sig - Route: Infuse 2 mL into a venous catheter Every 6 (Six) Hours As Needed for Nausea or Vomiting. - Intravenous    pneumococcal polysaccharide 23-valent (PNEUMOVAX-23) vaccine 0.5 mL 0.5 mL During Hospitalization 10/29/2017     Sig - Route: Inject 0.5 mL into the shoulder, thigh, or buttocks During Hospitalization for Immunization. - Intramuscular    sodium chloride 0.9 % flush 1-10 mL 1-10 mL " As Needed 10/29/2017     Sig - Route: Infuse 1-10 mL into a venous catheter As Needed for Line Care. - Intravenous    topiramate (TOPAMAX) tablet 25 mg 25 mg Nightly 10/29/2017     Sig - Route: Take 1 tablet by mouth Every Night. - Oral    vitamin B-12 (CYANOCOBALAMIN) tablet 1,000 mcg 1,000 mcg Daily 10/29/2017     Sig - Route: Take 2 tablets by mouth Daily. 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    Subjective: She feels as if she is back for her baseline.  She denies any specific issues.  She denies numbness tingling weakness speech issues hand numbness weakness etc.    Exam: No drift or tremor.  Facial symmetry.    NIHSS:    36 hours following onset  0-->Alert: keenly responsive  0-->Answers both questions correctly  0-->Performs both tasks correctly  0=normal  0=No visual loss  0=Normal symmetric movement  0-->No drift: limb holds 90 (or 45) degrees for full 10 secs  0-->No drift: limb holds 90 (or 45) degrees for full 10 secs  0-->No drift: limb holds 90 (or 45) degrees for full 10 secs  0-->No drift: limb holds 90 (or 45) degrees for full 10 secs  0=Absent  0=Normal; no sensory loss  0=No aphasia, normal  0=Normal  0=No abnormality    Total score: 0    Assessment: Stable.    The risks, benefits and alternatives of cerebral angiography and placement of PIPELINE stent for use in the affected artery to prevent blood flow into the aneurysm and to support coil embolization (if needed) were explained in detail to the patient. The alternative is not to undergo this procedure. The benefit of cerebral angiography with embolization should be, though is not guaranteed, the elucidation of the vascular anatomy of the brain and the blood vessels leading to the brain, and placement of a series of coils into the aneurysm in order to  prevent blood from flowing into it, thus potentially preventing rupture of the aneurysm. The benefit of PIPELINE stent is that it is less invasive than other surgical procedures, and provides a network of support for the coils and is useful for complex aneurysm. The risks of the procedure include, but are not limited to, the possibility of stroke, bleeding, blindness, damage to an artery in the brain, groin or neck (possibly requiring surgery to correct), infection, reaction to the contrast dye resulting in itching, swelling, anaphylaxis or even death, lack of ability to perform the procedure, need for further operative intervention, lack of ability to completely obliterate the aneurysm, reopening of the aneurysm, need for further embolization treatment or even open surgery to obliterate the aneurysm, compaction of the coils within the aneurysm causing possible reopening or rupture, etcetera. The patient voiced understanding of these risks, benefits and alternatives and requests that we proceed with cerebral angiography with coil embolization and stent placement.    The risks, benefits and alternatives of cerebral angiography with embolization of the aneurysm were explained in detail to the patient. The alternative is not to undergo this procedure. The benefit of cerebral angiography with embolization should be, though is not guaranteed, the elucidation of the vascular anatomy of the brain and the blood vessels leading to the brain, and placement of a series of coils into the aneurysm in order to prevent blood from flowing into it, thus potentially preventing rupture of the aneurysm. The risks of the procedure include, but are not limited to, the possibility of stroke, bleeding, blindness, damage to an artery in the brain, groin or neck (possibly requiring surgery to correct), infection, reaction to the contrast dye resulting in itching, swelling, anaphylaxis or even death, lack of ability to perform the procedure,  need for further operative intervention, lack of ability to completely obliterate the aneurysm, reopening of the aneurysm, need for further embolization treatment or even open surgery to obliterate the aneurysm, compaction of the coils within the aneurysm causing possible reopening or rupture, etcetera. The patient voiced understanding of the risks, benefits and alternatives and requests that we proceed with cerebral angiography with embolization of aneurysm.    Plan: Transfer to floor.  Cerebral aneurysm embolization this week.  I like to let the Plavix and aspirin worked for a little while and then treat the partially thrombosed aneurysm.  I did explain that placing coils into the aneurysm could displace a clot and therefore placement of a flow diverting stent across the neck of the aneurysm will be useful.     Electronically signed by Roger Day MD at 10/31/2017  8:32 AM      Ethan Jones MD at 10/31/2017 11:28 AM  Version 1 of [address] [place] [address] PULMONARY CARE         Dr Ethan Jones   LOS: 2 days   Patient Care Team:  No Known Provider as PCP - General  Soraida Jay MD as Consulting Physician (Obstetrics and Gynecology)    Chief Complaint: Left middle cerebral artery stroke in the setting off left ICA partially thrombosed aneurysm.    Interval History: Continues to feel better.  No specific complaints today.    REVIEW OF SYSTEMS:   CARDIOVASCULAR: No chest pain, chest pressure or chest discomfort. No palpitations or edema.   RESPIRATORY: No shortness of breath, cough or sputum.   GASTROINTESTINAL: No anorexia, nausea, vomiting or diarrhea. No abdominal pain or blood.   HEMATOLOGIC: No bleeding or bruising.     Ventilator/Non-Invasive Ventilation Settings     None            Vital Signs  Temp:  [97.8 °F (36.6 °C)-98.8 °F (37.1 °C)] 98.2 °F (36.8 °C)  Heart Rate:  [] 77  Resp:  [16] 16  BP: ()/() 93/65  No intake or output data in the 24 hours ending 10/31/17 1128  Flowsheet  "Rows         First Filed Value    Admission Height  66\" (167.6 cm) Documented at 10/29/2017 1502    Admission Weight  215 lb 8 oz (97.8 kg) Documented at 10/29/2017 1502          Physical Exam:   General Appearance:    Alert, cooperative, in no acute distress.  No facial asymmetry noted.  Speech appears to be normal.     Lungs:     Clear to auscultation,respirations regular, even and                  unlabored    Heart:    Regular rhythm and normal rate, normal S1 and S2, no            murmur, no gallop, no rub, no click   Chest Wall:    No abnormalities observed   Abdomen:     Normal bowel sounds, no masses, no organomegaly, soft        non-tender, non-distended, no guarding, no rebound                tenderness   Extremities:   Moves all extremities well, no edema, no cyanosis, no             redness     Results Review:          Results from last 7 days  Lab Units 10/30/17  0420 10/29/17  1228   SODIUM mmol/L 139 138   POTASSIUM mmol/L 4.3 3.5   CHLORIDE mmol/L 107 103   CO2 mmol/L 18.4* 21.0*   BUN mg/dL 8 9   CREATININE mg/dL 0.66 0.69   GLUCOSE mg/dL 85 113*   CALCIUM mg/dL 8.5* 9.3       Results from last 7 days  Lab Units 10/29/17  1228   TROPONIN T ng/mL <0.010       Results from last 7 days  Lab Units 10/31/17  1009 10/30/17  0420 10/29/17  1228   WBC 10*3/mm3  --  6.16 7.11   HEMOGLOBIN g/dL 8.5* 7.4* 8.9*   HEMATOCRIT % 30.1* 26.6* 30.9*   PLATELETS 10*3/mm3  --  372 395       Results from last 7 days  Lab Units 10/29/17  1228   INR  1.06   APTT seconds 23.4       Results from last 7 days  Lab Units 10/29/17  1228   CHOLESTEROL mg/dL 179           Results from last 7 days  Lab Units 10/29/17  1228   CHOLESTEROL mg/dL 179   TRIGLYCERIDES mg/dL 64   HDL CHOL mg/dL 94*           I reviewed the patient's new clinical results.  I personally viewed and interpreted the patient's CXR        Medication Review:     aspirin 81 mg Oral Daily   atorvastatin 40 mg Oral Nightly   clopidogrel 75 mg Oral Daily   docusate " sodium 100 mg Oral BID   topiramate 25 mg Oral Nightly   vitamin B-12 1,000 mcg Oral Daily         niCARdipine 5-15 mg/hr       ASSESSMENT:   Left MCA CVA in the setting off left ICA partially thrombosed aneurysm  History of migraine  History of B12 deficiency       PLAN:  Stroke symptoms are pretty much resolved with current NIH 0  Continue blood pressure management keep systolic less than 140  Aspirin Plavix started per neurosurgery  Plans per neurosurgery for cerebral angiogram and placement of pipeline stent.  Transfer out of the ICU today.    Ethan Jones MD  10/31/17  11:28 AM             Electronically signed by Ethan Jones MD at 10/31/2017 11:33 AM

## 2017-11-02 ENCOUNTER — APPOINTMENT (OUTPATIENT)
Dept: GENERAL RADIOLOGY | Facility: HOSPITAL | Age: 42
End: 2017-11-02

## 2017-11-02 ENCOUNTER — ANESTHESIA (OUTPATIENT)
Dept: PERIOP | Facility: HOSPITAL | Age: 42
End: 2017-11-02

## 2017-11-02 ENCOUNTER — ANESTHESIA EVENT (OUTPATIENT)
Dept: PERIOP | Facility: HOSPITAL | Age: 42
End: 2017-11-02

## 2017-11-02 PROBLEM — D50.9 IRON DEFICIENCY ANEMIA: Status: ACTIVE | Noted: 2017-11-02

## 2017-11-02 LAB
ABO GROUP BLD: NORMAL
ASA PLATELET INHIBITION: 488 ARU
BLD GP AB SCN SERPL QL: NEGATIVE
CLOSURE TME COLL+ADP BLD: 60 SECONDS (ref 52–122)
CLOSURE TME COLL+EPINEP BLD: >300 SECONDS (ref 68–148)
FERRITIN SERPL-MCNC: 6.01 NG/ML (ref 13–150)
GLUCOSE BLDC GLUCOMTR-MCNC: 99 MG/DL (ref 70–130)
HCG SERPL QL: NEGATIVE
IRON 24H UR-MRATE: 10 MCG/DL (ref 37–145)
IRON SATN MFR SERPL: 2 % (ref 20–50)
PA ADP PRP-ACNC: 279 PRU (ref 194–418)
RH BLD: POSITIVE
TIBC SERPL-MCNC: 469 MCG/DL
TRANSFERRIN SERPL-MCNC: 315 MG/DL (ref 200–360)

## 2017-11-02 PROCEDURE — 75894 X-RAYS TRANSCATH THERAPY: CPT

## 2017-11-02 PROCEDURE — 94799 UNLISTED PULMONARY SVC/PX: CPT

## 2017-11-02 PROCEDURE — 86900 BLOOD TYPING SEROLOGIC ABO: CPT | Performed by: NURSE PRACTITIONER

## 2017-11-02 PROCEDURE — C1769 GUIDE WIRE: HCPCS | Performed by: NEUROLOGICAL SURGERY

## 2017-11-02 PROCEDURE — C1894 INTRO/SHEATH, NON-LASER: HCPCS | Performed by: NEUROLOGICAL SURGERY

## 2017-11-02 PROCEDURE — 0 IOVERSOL 68 % SOLUTION: Performed by: INTERNAL MEDICINE

## 2017-11-02 PROCEDURE — C1887 CATHETER, GUIDING: HCPCS | Performed by: NEUROLOGICAL SURGERY

## 2017-11-02 PROCEDURE — 25010000002 PROTAMINE SULFATE PER 10 MG: Performed by: NURSE ANESTHETIST, CERTIFIED REGISTERED

## 2017-11-02 PROCEDURE — 86901 BLOOD TYPING SEROLOGIC RH(D): CPT | Performed by: NURSE PRACTITIONER

## 2017-11-02 PROCEDURE — 85576 BLOOD PLATELET AGGREGATION: CPT | Performed by: NURSE PRACTITIONER

## 2017-11-02 PROCEDURE — 36224 PLACE CATH CAROTD ART: CPT | Performed by: NEUROLOGICAL SURGERY

## 2017-11-02 PROCEDURE — 25010000002 ONDANSETRON PER 1 MG: Performed by: NURSE ANESTHETIST, CERTIFIED REGISTERED

## 2017-11-02 PROCEDURE — 25010000002 HEPARIN (PORCINE) PER 1000 UNITS: Performed by: NEUROLOGICAL SURGERY

## 2017-11-02 PROCEDURE — 82962 GLUCOSE BLOOD TEST: CPT

## 2017-11-02 PROCEDURE — 03VG3DZ RESTRICTION OF INTRACRANIAL ARTERY WITH INTRALUMINAL DEVICE, PERCUTANEOUS APPROACH: ICD-10-PCS | Performed by: NEUROLOGICAL SURGERY

## 2017-11-02 PROCEDURE — 85347 COAGULATION TIME ACTIVATED: CPT

## 2017-11-02 PROCEDURE — 76377 3D RENDER W/INTRP POSTPROCES: CPT | Performed by: NEUROLOGICAL SURGERY

## 2017-11-02 PROCEDURE — 99231 SBSQ HOSP IP/OBS SF/LOW 25: CPT | Performed by: NEUROLOGICAL SURGERY

## 2017-11-02 PROCEDURE — 75898 FOLLOW-UP ANGIOGRAPHY: CPT

## 2017-11-02 PROCEDURE — 76377 3D RENDER W/INTRP POSTPROCES: CPT

## 2017-11-02 PROCEDURE — 86923 COMPATIBILITY TEST ELECTRIC: CPT

## 2017-11-02 PROCEDURE — 61624 TCAT PERM OCCLS/EMBOLJ CNS: CPT | Performed by: NEUROLOGICAL SURGERY

## 2017-11-02 PROCEDURE — 75894 X-RAYS TRANSCATH THERAPY: CPT | Performed by: NEUROLOGICAL SURGERY

## 2017-11-02 PROCEDURE — 03VL3DZ RESTRICTION OF LEFT INTERNAL CAROTID ARTERY WITH INTRALUMINAL DEVICE, PERCUTANEOUS APPROACH: ICD-10-PCS | Performed by: NEUROLOGICAL SURGERY

## 2017-11-02 PROCEDURE — 25010000002 VANCOMYCIN 10 G RECONSTITUTED SOLUTION: Performed by: NEUROLOGICAL SURGERY

## 2017-11-02 PROCEDURE — 25010000002 FENTANYL CITRATE (PF) 100 MCG/2ML SOLUTION: Performed by: NURSE ANESTHETIST, CERTIFIED REGISTERED

## 2017-11-02 PROCEDURE — C1760 CLOSURE DEV, VASC: HCPCS | Performed by: NEUROLOGICAL SURGERY

## 2017-11-02 PROCEDURE — 84703 CHORIONIC GONADOTROPIN ASSAY: CPT | Performed by: NURSE PRACTITIONER

## 2017-11-02 PROCEDURE — 83540 ASSAY OF IRON: CPT | Performed by: INTERNAL MEDICINE

## 2017-11-02 PROCEDURE — 25010000002 PROPOFOL 10 MG/ML EMULSION: Performed by: NURSE ANESTHETIST, CERTIFIED REGISTERED

## 2017-11-02 PROCEDURE — B3171ZZ FLUOROSCOPY OF LEFT INTERNAL CAROTID ARTERY USING LOW OSMOLAR CONTRAST: ICD-10-PCS | Performed by: NEUROLOGICAL SURGERY

## 2017-11-02 PROCEDURE — 84466 ASSAY OF TRANSFERRIN: CPT | Performed by: INTERNAL MEDICINE

## 2017-11-02 PROCEDURE — 86850 RBC ANTIBODY SCREEN: CPT | Performed by: NURSE PRACTITIONER

## 2017-11-02 PROCEDURE — 25010000002 HEPARIN (PORCINE) PER 1000 UNITS: Performed by: NURSE ANESTHETIST, CERTIFIED REGISTERED

## 2017-11-02 PROCEDURE — 82728 ASSAY OF FERRITIN: CPT | Performed by: INTERNAL MEDICINE

## 2017-11-02 DEVICE — IMPLANTABLE DEVICE: Type: IMPLANTABLE DEVICE | Site: BRAIN | Status: FUNCTIONAL

## 2017-11-02 RX ORDER — CLOPIDOGREL BISULFATE 75 MG/1
300 TABLET ORAL ONCE
Status: COMPLETED | OUTPATIENT
Start: 2017-11-02 | End: 2017-11-02

## 2017-11-02 RX ORDER — HYDROMORPHONE HYDROCHLORIDE 1 MG/ML
0.5 INJECTION, SOLUTION INTRAMUSCULAR; INTRAVENOUS; SUBCUTANEOUS
Status: DISCONTINUED | OUTPATIENT
Start: 2017-11-02 | End: 2017-11-02 | Stop reason: HOSPADM

## 2017-11-02 RX ORDER — ASPIRIN 325 MG
650 TABLET ORAL DAILY
Status: DISCONTINUED | OUTPATIENT
Start: 2017-11-02 | End: 2017-11-03

## 2017-11-02 RX ORDER — EPHEDRINE SULFATE 50 MG/ML
5 INJECTION, SOLUTION INTRAVENOUS ONCE AS NEEDED
Status: DISCONTINUED | OUTPATIENT
Start: 2017-11-02 | End: 2017-11-02 | Stop reason: HOSPADM

## 2017-11-02 RX ORDER — PROMETHAZINE HYDROCHLORIDE 25 MG/ML
12.5 INJECTION, SOLUTION INTRAMUSCULAR; INTRAVENOUS ONCE AS NEEDED
Status: DISCONTINUED | OUTPATIENT
Start: 2017-11-02 | End: 2017-11-02 | Stop reason: HOSPADM

## 2017-11-02 RX ORDER — PROMETHAZINE HYDROCHLORIDE 25 MG/1
25 TABLET ORAL ONCE AS NEEDED
Status: DISCONTINUED | OUTPATIENT
Start: 2017-11-02 | End: 2017-11-02 | Stop reason: HOSPADM

## 2017-11-02 RX ORDER — HYDRALAZINE HYDROCHLORIDE 20 MG/ML
5 INJECTION INTRAMUSCULAR; INTRAVENOUS
Status: DISCONTINUED | OUTPATIENT
Start: 2017-11-02 | End: 2017-11-02 | Stop reason: HOSPADM

## 2017-11-02 RX ORDER — PROMETHAZINE HYDROCHLORIDE 25 MG/ML
6.25 INJECTION, SOLUTION INTRAMUSCULAR; INTRAVENOUS ONCE AS NEEDED
Status: DISCONTINUED | OUTPATIENT
Start: 2017-11-02 | End: 2017-11-02 | Stop reason: HOSPADM

## 2017-11-02 RX ORDER — ONDANSETRON 2 MG/ML
INJECTION INTRAMUSCULAR; INTRAVENOUS AS NEEDED
Status: DISCONTINUED | OUTPATIENT
Start: 2017-11-02 | End: 2017-11-02 | Stop reason: SURG

## 2017-11-02 RX ORDER — PROTAMINE SULFATE 10 MG/ML
INJECTION, SOLUTION INTRAVENOUS AS NEEDED
Status: DISCONTINUED | OUTPATIENT
Start: 2017-11-02 | End: 2017-11-02 | Stop reason: SURG

## 2017-11-02 RX ORDER — MIDAZOLAM HYDROCHLORIDE 1 MG/ML
2 INJECTION INTRAMUSCULAR; INTRAVENOUS
Status: DISCONTINUED | OUTPATIENT
Start: 2017-11-02 | End: 2017-11-02 | Stop reason: HOSPADM

## 2017-11-02 RX ORDER — PROMETHAZINE HYDROCHLORIDE 25 MG/1
25 SUPPOSITORY RECTAL ONCE AS NEEDED
Status: DISCONTINUED | OUTPATIENT
Start: 2017-11-02 | End: 2017-11-02 | Stop reason: HOSPADM

## 2017-11-02 RX ORDER — EPHEDRINE SULFATE 50 MG/ML
INJECTION, SOLUTION INTRAVENOUS AS NEEDED
Status: DISCONTINUED | OUTPATIENT
Start: 2017-11-02 | End: 2017-11-02 | Stop reason: SURG

## 2017-11-02 RX ORDER — FENTANYL CITRATE 50 UG/ML
50 INJECTION, SOLUTION INTRAMUSCULAR; INTRAVENOUS
Status: DISCONTINUED | OUTPATIENT
Start: 2017-11-02 | End: 2017-11-02 | Stop reason: HOSPADM

## 2017-11-02 RX ORDER — FENTANYL CITRATE 50 UG/ML
INJECTION, SOLUTION INTRAMUSCULAR; INTRAVENOUS AS NEEDED
Status: DISCONTINUED | OUTPATIENT
Start: 2017-11-02 | End: 2017-11-02 | Stop reason: SURG

## 2017-11-02 RX ORDER — DIPHENHYDRAMINE HYDROCHLORIDE 50 MG/ML
12.5 INJECTION INTRAMUSCULAR; INTRAVENOUS
Status: DISCONTINUED | OUTPATIENT
Start: 2017-11-02 | End: 2017-11-02 | Stop reason: HOSPADM

## 2017-11-02 RX ORDER — LIDOCAINE HYDROCHLORIDE 20 MG/ML
INJECTION, SOLUTION INFILTRATION; PERINEURAL AS NEEDED
Status: DISCONTINUED | OUTPATIENT
Start: 2017-11-02 | End: 2017-11-02 | Stop reason: SURG

## 2017-11-02 RX ORDER — SODIUM CHLORIDE 0.9 % (FLUSH) 0.9 %
1-10 SYRINGE (ML) INJECTION AS NEEDED
Status: DISCONTINUED | OUTPATIENT
Start: 2017-11-02 | End: 2017-11-02 | Stop reason: HOSPADM

## 2017-11-02 RX ORDER — ROCURONIUM BROMIDE 10 MG/ML
INJECTION, SOLUTION INTRAVENOUS AS NEEDED
Status: DISCONTINUED | OUTPATIENT
Start: 2017-11-02 | End: 2017-11-02 | Stop reason: SURG

## 2017-11-02 RX ORDER — FAMOTIDINE 10 MG/ML
20 INJECTION, SOLUTION INTRAVENOUS ONCE
Status: COMPLETED | OUTPATIENT
Start: 2017-11-02 | End: 2017-11-02

## 2017-11-02 RX ORDER — LABETALOL HYDROCHLORIDE 5 MG/ML
5 INJECTION, SOLUTION INTRAVENOUS
Status: DISCONTINUED | OUTPATIENT
Start: 2017-11-02 | End: 2017-11-02 | Stop reason: HOSPADM

## 2017-11-02 RX ORDER — NALOXONE HCL 0.4 MG/ML
0.4 VIAL (ML) INJECTION
Status: DISCONTINUED | OUTPATIENT
Start: 2017-11-02 | End: 2017-11-03 | Stop reason: HOSPADM

## 2017-11-02 RX ORDER — PROPOFOL 10 MG/ML
VIAL (ML) INTRAVENOUS AS NEEDED
Status: DISCONTINUED | OUTPATIENT
Start: 2017-11-02 | End: 2017-11-02 | Stop reason: SURG

## 2017-11-02 RX ORDER — HEPARIN SODIUM 1000 [USP'U]/ML
INJECTION, SOLUTION INTRAVENOUS; SUBCUTANEOUS AS NEEDED
Status: DISCONTINUED | OUTPATIENT
Start: 2017-11-02 | End: 2017-11-02 | Stop reason: SURG

## 2017-11-02 RX ORDER — MORPHINE SULFATE 2 MG/ML
2 INJECTION, SOLUTION INTRAMUSCULAR; INTRAVENOUS EVERY 4 HOURS PRN
Status: DISCONTINUED | OUTPATIENT
Start: 2017-11-02 | End: 2017-11-03 | Stop reason: HOSPADM

## 2017-11-02 RX ORDER — SODIUM CHLORIDE AND POTASSIUM CHLORIDE 150; 900 MG/100ML; MG/100ML
60 INJECTION, SOLUTION INTRAVENOUS CONTINUOUS
Status: DISCONTINUED | OUTPATIENT
Start: 2017-11-03 | End: 2017-11-03

## 2017-11-02 RX ORDER — NALOXONE HCL 0.4 MG/ML
0.2 VIAL (ML) INJECTION AS NEEDED
Status: DISCONTINUED | OUTPATIENT
Start: 2017-11-02 | End: 2017-11-02 | Stop reason: HOSPADM

## 2017-11-02 RX ORDER — ONDANSETRON 2 MG/ML
4 INJECTION INTRAMUSCULAR; INTRAVENOUS ONCE AS NEEDED
Status: DISCONTINUED | OUTPATIENT
Start: 2017-11-02 | End: 2017-11-02 | Stop reason: HOSPADM

## 2017-11-02 RX ORDER — SODIUM CHLORIDE 9 MG/ML
100 INJECTION, SOLUTION INTRAVENOUS CONTINUOUS
Status: ACTIVE | OUTPATIENT
Start: 2017-11-02 | End: 2017-11-02

## 2017-11-02 RX ORDER — SODIUM CHLORIDE, SODIUM LACTATE, POTASSIUM CHLORIDE, CALCIUM CHLORIDE 600; 310; 30; 20 MG/100ML; MG/100ML; MG/100ML; MG/100ML
9 INJECTION, SOLUTION INTRAVENOUS CONTINUOUS
Status: DISCONTINUED | OUTPATIENT
Start: 2017-11-02 | End: 2017-11-03

## 2017-11-02 RX ORDER — SODIUM CHLORIDE 9 MG/ML
75 INJECTION, SOLUTION INTRAVENOUS CONTINUOUS
Status: DISCONTINUED | OUTPATIENT
Start: 2017-11-02 | End: 2017-11-03

## 2017-11-02 RX ORDER — LIDOCAINE HYDROCHLORIDE 40 MG/ML
SOLUTION TOPICAL AS NEEDED
Status: DISCONTINUED | OUTPATIENT
Start: 2017-11-02 | End: 2017-11-02 | Stop reason: SURG

## 2017-11-02 RX ORDER — FAMOTIDINE 20 MG/1
20 TABLET, FILM COATED ORAL
Status: DISCONTINUED | OUTPATIENT
Start: 2017-11-02 | End: 2017-11-02 | Stop reason: HOSPADM

## 2017-11-02 RX ORDER — FLUMAZENIL 0.1 MG/ML
0.2 INJECTION INTRAVENOUS AS NEEDED
Status: DISCONTINUED | OUTPATIENT
Start: 2017-11-02 | End: 2017-11-02 | Stop reason: HOSPADM

## 2017-11-02 RX ORDER — MIDAZOLAM HYDROCHLORIDE 1 MG/ML
1 INJECTION INTRAMUSCULAR; INTRAVENOUS
Status: DISCONTINUED | OUTPATIENT
Start: 2017-11-02 | End: 2017-11-02 | Stop reason: HOSPADM

## 2017-11-02 RX ADMIN — VANCOMYCIN HYDROCHLORIDE 1500 MG: 10 INJECTION, POWDER, LYOPHILIZED, FOR SOLUTION INTRAVENOUS at 11:39

## 2017-11-02 RX ADMIN — TOPIRAMATE 25 MG: 25 TABLET, FILM COATED ORAL at 20:21

## 2017-11-02 RX ADMIN — TICAGRELOR 180 MG: 90 TABLET ORAL at 12:58

## 2017-11-02 RX ADMIN — SODIUM CHLORIDE 75 ML/HR: 9 INJECTION, SOLUTION INTRAVENOUS at 08:50

## 2017-11-02 RX ADMIN — FENTANYL CITRATE 50 MCG: 50 INJECTION INTRAMUSCULAR; INTRAVENOUS at 14:59

## 2017-11-02 RX ADMIN — FENTANYL CITRATE 50 MCG: 50 INJECTION INTRAMUSCULAR; INTRAVENOUS at 14:23

## 2017-11-02 RX ADMIN — IOVERSOL 145.9 ML: 678 INJECTION INTRA-ARTERIAL; INTRAVENOUS at 12:15

## 2017-11-02 RX ADMIN — HEPARIN SODIUM 6000 UNITS: 1000 INJECTION, SOLUTION INTRAVENOUS; SUBCUTANEOUS at 13:52

## 2017-11-02 RX ADMIN — ONDANSETRON 4 MG: 2 INJECTION INTRAMUSCULAR; INTRAVENOUS at 14:46

## 2017-11-02 RX ADMIN — FAMOTIDINE 20 MG: 10 INJECTION INTRAVENOUS at 12:38

## 2017-11-02 RX ADMIN — PROPOFOL 180 MG: 10 INJECTION, EMULSION INTRAVENOUS at 13:13

## 2017-11-02 RX ADMIN — FENTANYL CITRATE 100 MCG: 50 INJECTION INTRAMUSCULAR; INTRAVENOUS at 13:13

## 2017-11-02 RX ADMIN — CLOPIDOGREL 75 MG: 75 TABLET, FILM COATED ORAL at 08:37

## 2017-11-02 RX ADMIN — EPHEDRINE SULFATE 10 MG: 50 INJECTION INTRAMUSCULAR; INTRAVENOUS; SUBCUTANEOUS at 13:17

## 2017-11-02 RX ADMIN — ASPIRIN 81 MG: 81 TABLET, CHEWABLE ORAL at 08:36

## 2017-11-02 RX ADMIN — TICAGRELOR 60 MG: 60 TABLET ORAL at 17:41

## 2017-11-02 RX ADMIN — LIDOCAINE HYDROCHLORIDE 80 MG: 20 INJECTION, SOLUTION INFILTRATION; PERINEURAL at 13:13

## 2017-11-02 RX ADMIN — DOCUSATE SODIUM 100 MG: 100 CAPSULE, LIQUID FILLED ORAL at 20:00

## 2017-11-02 RX ADMIN — SUGAMMADEX 200 MG: 100 INJECTION, SOLUTION INTRAVENOUS at 14:55

## 2017-11-02 RX ADMIN — ROCURONIUM BROMIDE 50 MG: 10 INJECTION INTRAVENOUS at 13:13

## 2017-11-02 RX ADMIN — SODIUM CHLORIDE, POTASSIUM CHLORIDE, SODIUM LACTATE AND CALCIUM CHLORIDE 9 ML/HR: 600; 310; 30; 20 INJECTION, SOLUTION INTRAVENOUS at 12:38

## 2017-11-02 RX ADMIN — LIDOCAINE HYDROCHLORIDE 1 EACH: 40 SPRAY LARYNGEAL; TRANSTRACHEAL at 13:15

## 2017-11-02 RX ADMIN — SODIUM CHLORIDE 100 ML/HR: 9 INJECTION, SOLUTION INTRAVENOUS at 17:41

## 2017-11-02 RX ADMIN — ASPIRIN 650 MG: 325 TABLET ORAL at 09:40

## 2017-11-02 RX ADMIN — CLOPIDOGREL 300 MG: 75 TABLET, FILM COATED ORAL at 09:40

## 2017-11-02 RX ADMIN — PROTAMINE SULFATE 30 MG: 10 INJECTION, SOLUTION INTRAVENOUS at 14:42

## 2017-11-02 RX ADMIN — ROCURONIUM BROMIDE 50 MG: 10 INJECTION INTRAVENOUS at 13:56

## 2017-11-02 RX ADMIN — PROTAMINE SULFATE 10 MG: 10 INJECTION, SOLUTION INTRAVENOUS at 14:53

## 2017-11-02 RX ADMIN — ATORVASTATIN CALCIUM 40 MG: 20 TABLET, FILM COATED ORAL at 20:21

## 2017-11-02 RX ADMIN — EPHEDRINE SULFATE 10 MG: 50 INJECTION INTRAMUSCULAR; INTRAVENOUS; SUBCUTANEOUS at 13:31

## 2017-11-02 RX ADMIN — SODIUM CHLORIDE, POTASSIUM CHLORIDE, SODIUM LACTATE AND CALCIUM CHLORIDE: 600; 310; 30; 20 INJECTION, SOLUTION INTRAVENOUS at 14:59

## 2017-11-02 NOTE — INTERVAL H&P NOTE
H&P reviewed. The patient was examined and there are no changes to the H&P.     Improved neuro status.    P2Y12 not showing antiplatlet effect.    Will give Brilenta load and proceed with Pipeline. On ASA,

## 2017-11-02 NOTE — PLAN OF CARE
Problem: Patient Care Overview (Adult)  Goal: Plan of Care Review  Outcome: Ongoing (interventions implemented as appropriate)    11/02/17 0410   Outcome Evaluation   Outcome Summary/Follow up Plan Patient has slight assymetry of face with rt side weakness during smile. Patient AXOX4, no neurological changes throughout shift. To have a cerebral angiogram and embolization of cerebral angiogram with pipeline emolization device and coils schduled for 1230 on 11/2/2017. Patient has signed consent and is in chart. Resting comfortably at this time. AVSS. Will continue to monitor.    Coping/Psychosocial Response Interventions   Plan Of Care Reviewed With patient;spouse   Patient Care Overview   Progress progress toward functional goals as expected         Problem: Fall Risk (Adult)  Goal: Absence of Falls  Outcome: Ongoing (interventions implemented as appropriate)    11/02/17 0410   Fall Risk (Adult)   Absence of Falls making progress toward outcome

## 2017-11-02 NOTE — OP NOTE
EMBOLIZATION CEREBRAL  Procedure Note  Cerebral Angiogram and Placement of Pipeline Embolization Device for treatment of Cerebral aneurysm    Mariaelena Mack  10/29/2017 - 11/2/2017  [id number]    SURGEON  Roger Day MD    ASSISTANT:  Marni Davis CFA  INDICATION:  Wide necked cerebral aneurysm.  She was given Plavix and aspirin and also Brilinta in the preoperative area.  The risks, benefits, and alternatives of embolization of cerebral aneurysm again explained in the preoperative area to the patient.  He voiced understanding and requests that we proceed.    Pre-op Diagnosis:   Intracranial aneurysm [I67.1]      Post-Op Diagnosis Codes:     * Intracranial aneurysm [I67.1]    PROCEDURE PERFORMED:  Procedure(s):  Cerebral angiogram and embolization of cerebral angiogram with Pipeline Embolization Device and coils.    1. Selective catheterization and left internal carotid angiography  2. Superselective catheterization of the left middle cerebral artery (MCA) with Phenom microcatheter  3. Superselective catheterization of the left periophthalmic artery aneurysm and intraoperative angiography  4. Embolization of the cerebral aneurysm with a  9mm X 30cm Helix ev3 Axium coil  5. Embolization of cerebral aneurysm with a 3.25mm X 14 mm and a 3.5 mm X 14mm Pipeline Embolization Devices (PED)  6. 3d Reconstructed imagery on a separate work station X 2    6. Post embolization cerebral angiography    Anesthesia: General    Staff:   Circulator: Bonita Cameron RN; Shannon Spurling, RN  Scrub Person: Marni Carpenter  Vascular Radiology Technician: Tigist Barber; Xu He    Estimated Blood Loss: minimal    Specimens:    Order Name Source Comment Collection Info Order Time   HCG, SERUM, QUALITATIVE   Collected By: Jeannie Nagel 11/2/2017 11:38 AM   TYPE AND SCREEN Arm, Right  Collected By: Jeannie Nagel 11/2/2017 11:38 AM         Drains:   Urethral Catheter 11/02/17 1324 100% silicone 16 10 10 (Active)            Findings: 15mm large periophthalmic artery region cerebral aneurysm.    Complications: none    PROCEDURE IN DETAIL: The patient was brought to the operating room where she was hooked up to EKG, oxygen saturation, and blood pressure monitoring and placed on the biplane angiography table. Continuous monitoring was accomplished during the operative intervention.General endotracheal anesthesia was induced.     Local anesthesia was given.  Singlewall puncture technique 6 Pashto introducing sheath. 5 Pashto Art catheter.    Under fluoroscopic guidance selective catheterization of the left internal carotid artery.  left internal carotid artery cerebral angiography in multiple projections including rotational angiography reconstructed in a three-dimensional manner on a separate workstation demonstrates the normal branching pattern of the internal carotid artery. There is no evidence of  arterio-venous malformation, arterio-venous fistula or venous anomaly. There is a 15mm large periophthalmic artery region cerebral aneurysm.    The patient was heparinized to 2X baseline ACT.    The Art catheter was exchanged over an Amplatz super stiff exchange length wire for a 90cm 7F sheath and placed into the common cartoid. A Phenom catheter was prepared with a Synchro wire and placed coaxially into a Navien Distal Access Catheter (DAC). Continuous heparinized saline flush was run through the Y-connectors. The Phenom/DAC combination were placed into the 6 F long sheath coaxially with another continuous heparinized saline flush.    Under live flouroscopy, the Marksman was placed into the left MCA and the DAC was advanced over the Marksman into the petrous carotid.     Confirmation of MCA location of the microcatheter was accomplished with a short flouroscopic angiogram.    I then chose an echelon microcatheter and another Synchro wire and I placed this within the 7 Pashto sheath outside of the Navien and distal access  catheter.  I then super selectively catheterize the left internal carotid artery large periophthalmic artery region aneurysm.  A short fluoroscopic angiogram was accomplished demonstrating that the microcatheter was within the aneurysm.    I chose a 9 mm x 30 cm coil and I placed a couple of loops into the aneurysm but I did not try to deploy the entirety of the coil.    Based on the initial angiogram and measurements of the size of the internal carotid artery, I chose a 3.25 mm X 14 mm PED. The PED sheath was allowed to flush with heparinized saline in the hub of the rotating hemostatic valve attached to the Marksman delivery microcatheter and than was advanced into the catheter.    Live overlay flouroscopy was used and the PED was advanced to the tip of the Phenom catheter in the  MCA. The device was pushed out about 5-6mm and allowed to begin to expand. Once the device was partially open, the distal portion of the device was pulled back to the target point distal to the neck of the aneurysm. The device was than advanced out of the microcatheter until the proximal portion of the PED was expanded. I waited until ful expansion of the PED was confirmed. I than advanced the Marksman catheter over the delivery wire though the PED into the MCA.    Arteriography through the existing DAC showed the PED to be fully deployed across the neck of the aneurysm and to apposed to the internal carotid walls very well.  There was however a question as to whether or not the more proximal portion of the PED was a little bit into the neck of the aneurysm and therefore I chose a second Pipeline Embolization Device this one was 14 mm in length by 3.5 mm in width.    Live overlay flouroscopy was used and the PED was advanced to the tip of the Phenom catheter in the  Distal ICA. The device was pushed out about 5-6mm and allowed to begin to expand. Once the device was partially open, the distal portion of the device was pulled back to the  target point just distal to the neck of the aneurysm. The device was than advanced out of the microcatheter until the proximal portion of the PED was expanded. I brought the device around the curvature of the siphon.  I waited until ful expansion of the PED was confirmed. I than advanced the Marksman catheter over the delivery wire though the PED into the distal internal carotid artery.    I then went ahead and deployed the large coil into the cerebral aneurysm and detach the coil.    Postembolization arteriography in multiple projections including rotational angiography reconstructed in a three-dimensional manner demonstrated no flow restriction within the great vessels intracranially.  There is slow flow into the aneurysm as expected.  There is double density of the pipeline embolization device That just at the neck of the aneurysm.    Arteriography through the existing sheath demonstrates the sheath to be within the common femoral artery. Minyx system was used to close the artery, pressure was held for the appropriate length of time and the patient was transferred to the recovery room in stable and good condition.      Roger Day MD     Date: 11/2/2017  Time: 3:04 PM

## 2017-11-02 NOTE — PROGRESS NOTES
Continued Stay Note  [facility]     Patient Name: Mariaelena Mack  MRN: [id number]  Today's Date: 11/2/2017    Admit Date: 10/29/2017          Discharge Plan       11/02/17 1027    Case Management/Social Work Plan    Additional Comments Patient going for stent placment today.  Will millie to follow for discharge needs.              Discharge Codes     None            Marah Camacho RN

## 2017-11-02 NOTE — PLAN OF CARE
Problem: Patient Care Overview (Adult)  Goal: Plan of Care Review  Outcome: Ongoing (interventions implemented as appropriate)  Patient had aneruysm repair with coiling and stent placement today.  NIH remains a 1.  Site from angiogram is clean dry and intact.  Goal: Adult Individualization and Mutuality  Outcome: Ongoing (interventions implemented as appropriate)    Problem: Stroke (Ischemic) (Adult)  Goal: Signs and Symptoms of Listed Potential Problems Will be Absent or Manageable (Stroke)  Outcome: Ongoing (interventions implemented as appropriate)    11/02/17 1732   Stroke (Ischemic)   Problems Assessed (Stroke (Ischemic)/TIA) situational response         Problem: Fall Risk (Adult)  Goal: Identify Related Risk Factors and Signs and Symptoms  Outcome: Ongoing (interventions implemented as appropriate)  Goal: Absence of Falls  Outcome: Ongoing (interventions implemented as appropriate)    Problem: Perioperative Period (Adult)  Goal: Signs and Symptoms of Listed Potential Problems Will be Absent or Manageable (Perioperative Period)  Outcome: Ongoing (interventions implemented as appropriate)    11/02/17 1732   Perioperative Period   Problems Assessed (Perioperative Period) situational response

## 2017-11-02 NOTE — PAYOR COMM NOTE
"UR CONTACT:   HUGH  P:  [phone]  F:  [phone]        Joy Mack (42 y.o. Female)     Date of Birth Social Security Number Address Home Phone MRN    1975  [id number] [facility] [address] [phone] [id number]    Jainism Marital Status          Unknown Unknown       Admission Date Admission Type Admitting Provider Attending Provider Department, Room/Bed    10/29/17 Emergency Ethan Jones MD Sayied, Tausif, MD [facility] MAIN OR, [facility] Main OR/MAIN OR    Discharge Date Discharge Disposition Discharge Destination                      Attending Provider: Ethan Jones MD     Allergies:  No Known Allergies    Isolation:  None   Infection:  None   Code Status:  FULL    Ht:  66\" (167.6 cm)   Wt:  215 lb 8 oz (97.8 kg)    Admission Cmt:  None   Principal Problem:  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]                 Active Insurance as of 10/29/2017     Primary Coverage     Payor Plan Insurance Group Employer/Plan Group    [employer] BLUE [employer]OSS [employer] O [id number]     Payor Plan Address Payor Plan Phone Number Effective From Effective To    [address] [phone] 1/1/2010     [place] [address]       Subscriber Name Subscriber Birth Date Member ID       JOY MACK 1975 [id number]                 Emergency Contacts      (Rel.) Home Phone Work Phone Mobile Phone    Silvestre Mack (Spouse) -- -- [phone]            Lines, Drains & Airways    Active LDAs     Name:   Placement date:   Placement time:   Site:   Days:    Peripheral IV Line - Single Lumen 11/02/17 0809  18 gauge;1 in length  11/02/17    0809      less than 1                Hospital Medications (active)       Dose Frequency Start End    acetaminophen (TYLENOL) suppository 650 mg (MAR Hold) ((MAR Hold) since 11/2/2017 11:23 AM) 650 mg Every 4 Hours PRN 10/29/2017     Sig - Route: Insert 1 suppository into the rectum Every 4 (Four) " "Hours As Needed for Mild Pain  or Fever (Temperature greater than or equal to 37 C). - Rectal    Linked Group 1:  \"Or\" Linked Group Details        acetaminophen (TYLENOL) tablet 650 mg (MAR Hold) ((MAR Hold) since 11/2/2017 11:23 AM) 650 mg Every 4 Hours PRN 10/29/2017     Sig - Route: Take 2 tablets by mouth Every 4 (Four) Hours As Needed for Mild Pain  or Fever (Temperature greater than or equal to 37 C). - Oral    Linked Group 1:  \"Or\" Linked Group Details        aspirin chewable tablet 81 mg (MAR Hold) ((MAR Hold) since 11/2/2017 11:23 AM) 81 mg Daily 10/29/2017     Sig - Route: Chew 1 tablet Daily. - Oral    aspirin tablet 650 mg (MAR Hold) ((MAR Hold) since 11/2/2017 11:23 AM) 650 mg Daily 11/2/2017     Sig - Route: Take 2 tablets by mouth Daily. - Oral    atorvastatin (LIPITOR) tablet 40 mg (MAR Hold) ((MAR Hold) since 11/2/2017 11:23 AM) 40 mg Nightly 10/30/2017     Sig - Route: Take 2 tablets by mouth Every Night. - Oral    clonazePAM (KlonoPIN) tablet 0.5 mg 0.5 mg 2 Times Daily PRN 10/29/2017 11/8/2017    Sig - Route: Take 1 tablet by mouth 2 (Two) Times a Day As Needed (anxiety). - Oral    clopidogrel (PLAVIX) tablet 300 mg 300 mg Once 11/2/2017 11/2/2017    Sig - Route: Take 4 tablets by mouth 1 (One) Time. - Oral    clopidogrel (PLAVIX) tablet 75 mg (MAR Hold) ((MAR Hold) since 11/2/2017 11:23 AM) 75 mg Daily 10/30/2017     Sig - Route: Take 1 tablet by mouth Daily. - Oral    docusate sodium (COLACE) capsule 100 mg (MAR Hold) ((MAR Hold) since 11/2/2017 11:23 AM) 100 mg 2 Times Daily 10/29/2017     Sig - Route: Take 1 capsule by mouth 2 (Two) Times a Day. - Oral    famotidine (PEPCID) tablet 20 mg 20 mg 30 min pre-op 11/2/2017     Sig - Route: Take 1 tablet by mouth 30 Min Pre-Op. - Oral    Influenza Vac Subunit Quad (FLUCELVAX) injection 0.5 mL (MAR Hold) ((MAR Hold) since 11/2/2017 11:23 AM) 0.5 mL During Hospitalization 10/29/2017     Sig - Route: Inject 0.5 mL into the shoulder, thigh, or buttocks " During Hospitalization for Immunization. - Intramuscular    ioversol (OPTIRAY 320) injection 300 mL 300 mL Once in Imaging 11/2/2017     Sig - Route: Inject 300 mL into an arterial catheter Once. - Intra-arterial    niCARdipine (CARDENE) 25 mg/250 mL (0.1 mg/mL) 0.9% NS VTB infusion 5-15 mg/hr Titrated 10/29/2017     Sig - Route: Infuse 5-15 mg/hr into a venous catheter Dose Adjusted By Provider As Needed. - Intravenous    ondansetron (ZOFRAN) injection 4 mg (MAR Hold) ((MAR Hold) since 11/2/2017 11:23 AM) 4 mg Every 6 Hours PRN 10/29/2017     Sig - Route: Infuse 2 mL into a venous catheter Every 6 (Six) Hours As Needed for Nausea or Vomiting. - Intravenous    pneumococcal polysaccharide 23-valent (PNEUMOVAX-23) vaccine 0.5 mL (MAR Hold) ((MAR Hold) since 11/2/2017 11:23 AM) 0.5 mL During Hospitalization 10/29/2017     Sig - Route: Inject 0.5 mL into the shoulder, thigh, or buttocks During Hospitalization for Immunization. - Intramuscular    sodium chloride 0.9 % flush 1-10 mL (MAR Hold) ((MAR Hold) since 11/2/2017 11:23 AM) 1-10 mL As Needed 10/29/2017     Sig - Route: Infuse 1-10 mL into a venous catheter As Needed for Line Care. - Intravenous    sodium chloride 0.9 % infusion 75 mL/hr Continuous 11/2/2017     Sig - Route: Infuse 75 mL/hr into a venous catheter Continuous. - Intravenous    topiramate (TOPAMAX) tablet 25 mg 25 mg Nightly 10/29/2017     Sig - Route: Take 1 tablet by mouth Every Night. - Oral    vancomycin 1500 mg/500 mL 0.9% NS IVPB ([facility]) 15 mg/kg × 97.8 kg Once 11/2/2017 11/2/2017    Sig - Route: Infuse 500 mL into a venous catheter 1 (One) Time. - Intravenous    vitamin B-12 (CYANOCOBALAMIN) tablet 1,000 mcg (MAR Hold) ((MAR Hold) since 11/2/2017 11:23 AM) 1,000 mcg Daily 10/29/2017     Sig - Route: Take 2 tablets by mouth Daily. - Oral             Physician Progress Notes      Tausif Sayied, MD at 11/1/2017  4:59 PM  Version 1 of 1               [facility]         Dr Long  "Sayied   LOS: 3 days   Patient Care Team:  India Stubbs MD as PCP - General (Family Medicine)  Soraida Jay MD as Consulting Physician (Obstetrics and Gynecology)    Chief Complaint: Left middle cerebral artery stroke in the setting off left ICA partially thrombosed aneurysm.    Interval History: nervous for the surgery and angiogram.  No complaints today.    REVIEW OF SYSTEMS:   CARDIOVASCULAR: No chest pain, chest pressure or chest discomfort. No palpitations or edema.   RESPIRATORY: No shortness of breath, cough or sputum.   GASTROINTESTINAL: No anorexia, nausea, vomiting or diarrhea. No abdominal pain or blood.   HEMATOLOGIC: No bleeding or bruising.     Ventilator/Non-Invasive Ventilation Settings     None            Vital Signs  Temp:  [98 °F (36.7 °C)-98.9 °F (37.2 °C)] 98 °F (36.7 °C)  Heart Rate:  [65-98] 76  Resp:  [14-18] 18  BP: ()/(53-76) 100/60  No intake or output data in the 24 hours ending 11/01/17 1659  Flowsheet Rows         First Filed Value    Admission Height  66\" (167.6 cm) Documented at 10/29/2017 1502    Admission Weight  215 lb 8 oz (97.8 kg) Documented at 10/29/2017 1502          Physical Exam:   General Appearance:    Alert, cooperative, in no acute distress.  No facial asymmetry noted.  Speech appears to be normal.     Lungs:     Clear to auscultation,respirations regular, even and                  unlabored    Heart:    Regular rhythm and normal rate, normal S1 and S2, no            murmur, no gallop, no rub, no click   Chest Wall:    No abnormalities observed   Abdomen:     Normal bowel sounds, no masses, no organomegaly, soft        non-tender, non-distended, no guarding, no rebound                tenderness   Extremities:   Moves all extremities well, no edema, no cyanosis, no             redness     Results Review:          Results from last 7 days  Lab Units 11/01/17  0600 10/30/17  0420 10/29/17  1228   SODIUM mmol/L 142 139 138   POTASSIUM mmol/L 3.7 4.3 3.5 "   CHLORIDE mmol/L 109* 107 103   CO2 mmol/L 18.4* 18.4* 21.0*   BUN mg/dL 9 8 9   CREATININE mg/dL 0.65 0.66 0.69   GLUCOSE mg/dL 95 85 113*   CALCIUM mg/dL 8.5* 8.5* 9.3       Results from last 7 days  Lab Units 10/29/17  1228   TROPONIN T ng/mL <0.010       Results from last 7 days  Lab Units 11/01/17  0600 10/31/17  1009 10/30/17  0420 10/29/17  1228   WBC 10*3/mm3 6.06  --  6.16 7.11   HEMOGLOBIN g/dL 7.5* 8.5* 7.4* 8.9*   HEMATOCRIT % 27.2* 30.1* 26.6* 30.9*   PLATELETS 10*3/mm3 320  --  372 395       Results from last 7 days  Lab Units 10/29/17  1228   INR  1.06   APTT seconds 23.4       Results from last 7 days  Lab Units 10/29/17  1228   CHOLESTEROL mg/dL 179           Results from last 7 days  Lab Units 10/29/17  1228   CHOLESTEROL mg/dL 179   TRIGLYCERIDES mg/dL 64   HDL CHOL mg/dL 94*           I reviewed the patient's new clinical results.  I personally viewed and interpreted the patient's CXR        Medication Review:     aspirin 81 mg Oral Daily   atorvastatin 40 mg Oral Nightly   clopidogrel 75 mg Oral Daily   docusate sodium 100 mg Oral BID   topiramate 25 mg Oral Nightly   vitamin B-12 1,000 mcg Oral Daily         niCARdipine 5-15 mg/hr       ASSESSMENT:   Left MCA CVA in the setting off left ICA partially thrombosed aneurysm  History of migraine  History of B12 deficiency  pfo   Anemia likely due to menorrhagia    PLAN:  Stroke symptoms are pretty much resolved with current NIH 0  Continue blood pressure management keep systolic less than 140  Aspirin Plavix started per neurosurgery  Plans per neurosurgery for cerebral angiogram and placement of pipeline stent.  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    Subjective: She denies any speech difficulties numbness tingling weakness etc.    Degenerative: There is mild right facial weakness otherwise there are no neurologic deficits.  A formal NIH stroke scale is equal to 1 because of very mild right facial weakness.    NIHSS:    7-10 days  0-->Alert: keenly responsive  0-->Answers both questions correctly  0-->Performs both tasks correctly  0=normal  0=No visual loss  1=Minor paralysis (flattened nasolabial fold, asymmetric on smiling)  0-->No drift: limb holds 90 (or 45) degrees for full 10 secs  0-->No drift: limb holds 90 (or 45) degrees for full 10 secs  0-->No drift: limb holds 90 (or 45) degrees for full 10 secs  0-->No drift: limb holds 90 (or 45) degrees for full 10 secs  0=Absent  0=Normal; no sensory loss  0=No aphasia, normal  0=Normal  0=No abnormality    Total score:1    P2Y12 and platelet function assay are both demonstrative of lack of aspirin and Plavix effect so far.    Her hemoglobin is low less than 8.  Her iron value is low.  This is likely iron deficiency anemia.    Assessment: Stable/improved neurologically.  Iron deficiency anemia.  So far lack of Plavix and aspirin efficacy.    Plan: We will load her both with aspirin and with Plavix.  We will recheck her P2Y12 and platelet function assay and if her P2Y12 assay still is not within inhibited range we will go ahead and switch her to a new or antiplatelet agent prior to placement of the pipeline stent today.     Electronically signed by Roger Day MD at 11/2/2017  8:45 AM

## 2017-11-02 NOTE — ANESTHESIA PROCEDURE NOTES
Airway  Urgency: elective    Airway not difficult    General Information and Staff    Patient location during procedure: OR  Anesthesiologist: HILLARY DALTON  CRNA: ELEN MALCOLM    Indications and Patient Condition  Indications for airway management: airway protection    Preoxygenated: yes  MILS not maintained throughout  Mask difficulty assessment: 1 - vent by mask    Final Airway Details  Final airway type: endotracheal airway      Successful airway: ETT  Cuffed: yes   Successful intubation technique: direct laryngoscopy  Endotracheal tube insertion site: oral  Blade: Griffin  Blade size: #3  ETT size: 7.0 mm  Cormack-Lehane Classification: grade I - full view of glottis  Placement verified by: chest auscultation and capnometry   Measured from: lips  ETT to lips (cm): 20  Number of attempts at approach: 1    Additional Comments  Dentition intact and unchanged. CBEBS.  +ETCO2.

## 2017-11-02 NOTE — PROGRESS NOTES
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    Interval History: back in the ICU status post selective catheterization and embolization of the cerebral aneurysm with placement of stent.  Postop doing well.  Denies any complaints.    REVIEW OF SYSTEMS:   CARDIOVASCULAR: No chest pain, chest pressure or chest discomfort. No palpitations or edema.   RESPIRATORY: No shortness of breath, cough or sputum.   GASTROINTESTINAL: No anorexia, nausea, vomiting or diarrhea. No abdominal pain or blood.   HEMATOLOGIC: No bleeding or bruising.     Ventilator/Non-Invasive Ventilation Settings     None            Vital Signs  Temp:  [98.1 °F (36.7 °C)-98.9 °F (37.2 °C)] 98.3 °F (36.8 °C)  Heart Rate:  [] 81  Resp:  [16-18] 16  BP: ()/(49-88) 108/71    Intake/Output Summary (Last 24 hours) at 11/02/17 1658  Last data filed at 11/02/17 1508   Gross per 24 hour   Intake             1200 ml   Output              700 ml   Net              500 ml     Flowsheet Rows         First Filed Value    Admission Height  66\" (167.6 cm) Documented at 10/29/2017 1502    Admission Weight  215 lb 8 oz (97.8 kg) Documented at 10/29/2017 1502          Physical Exam:   General Appearance:    Alert, cooperative, in no acute distress.  No facial asymmetry noted.  Speech appears to be normal.     Lungs:     Clear to auscultation,respirations regular, even and                  unlabored    Heart:    Regular rhythm and normal rate, normal S1 and S2, no            murmur, no gallop, no rub, no click   Chest Wall:    No abnormalities observed   Abdomen:     Normal bowel sounds, no masses, no organomegaly, soft        non-tender, non-distended, no guarding, no " rebound                tenderness   Extremities:   Moves all extremities well, no edema, no cyanosis, no             redness     Results Review:          Results from last 7 days  Lab Units 11/01/17  0600 10/30/17  0420 10/29/17  1228   SODIUM mmol/L 142 139 138   POTASSIUM mmol/L 3.7 4.3 3.5   CHLORIDE mmol/L 109* 107 103   CO2 mmol/L 18.4* 18.4* 21.0*   BUN mg/dL 9 8 9   CREATININE mg/dL 0.65 0.66 0.69   GLUCOSE mg/dL 95 85 113*   CALCIUM mg/dL 8.5* 8.5* 9.3       Results from last 7 days  Lab Units 10/29/17  1228   TROPONIN T ng/mL <0.010       Results from last 7 days  Lab Units 11/01/17  0600 10/31/17  1009 10/30/17  0420 10/29/17  1228   WBC 10*3/mm3 6.06  --  6.16 7.11   HEMOGLOBIN g/dL 7.5* 8.5* 7.4* 8.9*   HEMATOCRIT % 27.2* 30.1* 26.6* 30.9*   PLATELETS 10*3/mm3 320  --  372 395       Results from last 7 days  Lab Units 10/29/17  1228   INR  1.06   APTT seconds 23.4       Results from last 7 days  Lab Units 10/29/17  1228   CHOLESTEROL mg/dL 179           Results from last 7 days  Lab Units 10/29/17  1228   CHOLESTEROL mg/dL 179   TRIGLYCERIDES mg/dL 64   HDL CHOL mg/dL 94*           I reviewed the patient's new clinical results.  I personally viewed and interpreted the patient's CXR        Medication Review:     [MAR Hold] aspirin 81 mg Oral Daily   [MAR Hold] aspirin 650 mg Oral Daily   [MAR Hold] atorvastatin 40 mg Oral Nightly   [MAR Hold] clopidogrel 75 mg Oral Daily   [MAR Hold] docusate sodium 100 mg Oral BID   ticagrelor 60 mg Oral BID   topiramate 25 mg Oral Nightly   [MAR Hold] vitamin B-12 1,000 mcg Oral Daily         lactated ringers 9 mL/hr Last Rate: 9 mL/hr (11/02/17 1238)   niCARdipine 5-15 mg/hr    sodium chloride 75 mL/hr Last Rate: 75 mL/hr (11/02/17 0850)       ASSESSMENT:   Left MCA CVA in the setting off left ICA partially thrombosed aneurysm  History of migraine  History of B12 deficiency  pfo   Anemia likely due to menorrhagia    PLAN:  Status post pipeline stent for cerebral  aneurysm.  Postop neurologically stable currently.  Continue blood pressure management keep systolic less than 140  Aspirin Plavix started per neurosurgery  Plans per neurosurgery for cerebral angiogram and placement of pipeline stent.  Labs and studies noted  Will likely need Venofer for iron replacement  Will replace once okay with neurosurgery    Ethan Jones MD  11/02/17  4:58 PM

## 2017-11-02 NOTE — ANESTHESIA PREPROCEDURE EVALUATION
Anesthesia Evaluation     Patient summary reviewed   no history of anesthetic complications:  NPO Solid Status: > 8 hours  NPO Liquid Status: > 2 hours     Airway   Mallampati: II  TM distance: >3 FB  Neck ROM: full  no difficulty expected  Dental      Pulmonary     breath sounds clear to auscultation  (-) shortness of breath, not a smoker  Cardiovascular   Exercise tolerance: good (4-7 METS)    Rhythm: regular  Rate: normal    (+) PVD,   (-) angina, ROSE    ROS comment: Echo: EF 61-65%. Atrial level shunt    Neuro/Psych  (+) CVA (residual R facial paralysis) residual symptoms,    GI/Hepatic/Renal/Endo      Musculoskeletal     Abdominal    Substance History      OB/GYN          Other                                      Anesthesia Plan    ASA 3     general     intravenous induction   Anesthetic plan and risks discussed with patient.

## 2017-11-02 NOTE — ANESTHESIA POSTPROCEDURE EVALUATION
Patient: Mariaelena Mack    Procedure Summary     Date Anesthesia Start Anesthesia Stop Room / Location    11/02/17 1306 1508  [facility] OR 19 INV /  [facility] HYBRID OR 18/19       Procedure Diagnosis Provider Provider    Cerebral angiogram and embolization of cerebral angiogram with Pipeline Embolization Device and coils. (N/A ) Intracranial aneurysm  (Intracranial aneurysm [I67.1]) MD Canelo Callaway MD          Anesthesia Type: MAC  Last vitals  BP   104/67 (11/02/17 1545)   Temp   36.8 °C (98.3 °F) (11/02/17 1506)   Pulse   82 (11/02/17 1545)   Resp   16 (11/02/17 1545)     SpO2   100 % (11/02/17 1545)     Post Anesthesia Care and Evaluation    Patient location during evaluation: PACU  Patient participation: complete - patient participated  Level of consciousness: awake and alert  Pain management: adequate  Airway patency: patent  Anesthetic complications: No anesthetic complications    Cardiovascular status: acceptable  Respiratory status: acceptable  Hydration status: acceptable

## 2017-11-02 NOTE — PLAN OF CARE
Problem: Perioperative Period (Adult)  Goal: Signs and Symptoms of Listed Potential Problems Will be Absent or Manageable (Perioperative Period)  Outcome: Ongoing (interventions implemented as appropriate)    11/02/17 1146   Perioperative Period   Problems Assessed (Perioperative Period) hypoxia/hypoxemia;physiologic stress response;situational response   Problems Present (Perioperative Period) hypoxia/hypoxemia;situational response;physiologic stress response

## 2017-11-02 NOTE — H&P (VIEW-ONLY)
Patient name: Mariaelena Mack  Referring Provider: Dr. Spike Aranda  Reason for Consultation: Definitive management of large cerebral aneurysm.    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     History of present illness:    Patient is a 42 y.o. right handed female who presents with the onset little before 11:00 this morning I was severe headache followed almost immediately by right facial weakness difficulty with speech and a unusual sensation on the right arm not associated with weakness.  This improved substantially.  She was admitted through the emergency room because of improving neurologic function but not yet back to normal.  Imaging demonstrated an infarct in the left lateral motor strip and the sensorimotor strip on the left side as well as a large cerebral aneurysm partially thrombosed.    Neurosurgery consultation for definitive management of these issues.  At this time she notes a substantial improvement in her speech.  She notes no particular problems with word finding.  She notes no problem with understanding.  Her arm still feels as if she has had a workout with the right arm at the unusual sensation is substantially improved.    She denies prior issues like this.  She notes that her mother  on emerging from anesthetic because of a stroke.  Otherwise there is no specific family history of aneurysm.  Grandmother did have a stroke.    She routinely sees her OB/GYN physician.  She does not routinely see a primary care physician.    Review of Systems  Review of Systems   Constitutional: Negative for activity change, fatigue, fever and unexpected weight change.   HENT: Positive for sinus pain. Negative for facial swelling, trouble swallowing and voice change.     Eyes: Negative for pain and visual disturbance.   Respiratory: Negative for cough and shortness of breath.    Gastrointestinal: Negative for abdominal distention and abdominal pain.   Endocrine: Negative for polyphagia and polyuria.   Genitourinary: Negative for dysuria and vaginal bleeding.   Musculoskeletal: Negative for back pain and gait problem.   Skin: Negative for color change and pallor.   Allergic/Immunologic: Negative for immunocompromised state.   Neurological: Positive for facial asymmetry, speech difficulty, numbness and headaches. Negative for dizziness, tremors, seizures, syncope, weakness and light-headedness.   Hematological: Does not bruise/bleed easily.   Psychiatric/Behavioral: Positive for hallucinations. Negative for agitation.       History  PAST MEDICAL HISTORY  History reviewed. No pertinent past medical history.    PAST SURGICAL HISTORY  History reviewed. No pertinent surgical history.    FAMILY HISTORY  Family History   Problem Relation Age of Onset   • Migraines Mother    • Heart failure Mother        SOCIAL HISTORY  Social History   Substance Use Topics   • Smoking status: Former Smoker     Packs/day: 1.00     Years: 20.00     Types: Cigarettes     Quit date: 2015   • Smokeless tobacco: Never Used   • Alcohol use 12.0 oz/week     20 Cans of beer per week      Comment: weekends       full time job doing analysis at [employer]      Allergies:  Review of patient's allergies indicates no known allergies.    MEDICATIONS:  No prescriptions prior to admission.       Objective     Results Review:  LABS:    Admission on 10/29/2017   Component Date Value Ref Range Status   • Glucose 10/29/2017 113* 65 - 99 mg/dL Final   • BUN 10/29/2017 9  6 - 20 mg/dL Final   • Creatinine 10/29/2017 0.69  0.57 - 1.00 mg/dL Final   • Sodium 10/29/2017 138  136 - 145 mmol/L Final   • Potassium 10/29/2017 3.5  3.5 - 5.2 mmol/L Final   • Chloride 10/29/2017 103  98 - 107 mmol/L Final   • CO2 10/29/2017  21.0* 22.0 - 29.0 mmol/L Final   • Calcium 10/29/2017 9.3  8.6 - 10.5 mg/dL Final   • Total Protein 10/29/2017 7.3  6.0 - 8.5 g/dL Final   • Albumin 10/29/2017 4.50  3.50 - 5.20 g/dL Final   • ALT (SGPT) 10/29/2017 10  1 - 33 U/L Final   • AST (SGOT) 10/29/2017 17  1 - 32 U/L Final   • Alkaline Phosphatase 10/29/2017 40  39 - 117 U/L Final   • Total Bilirubin 10/29/2017 0.5  0.1 - 1.2 mg/dL Final   • eGFR Non African Amer 10/29/2017 93  >60 mL/min/1.73 Final   • Globulin 10/29/2017 2.8  gm/dL Final   • A/G Ratio 10/29/2017 1.6  g/dL Final   • BUN/Creatinine Ratio 10/29/2017 13.0  7.0 - 25.0 Final   • Anion Gap 10/29/2017 14.0  mmol/L Final   • Protime 10/29/2017 13.4  11.7 - 14.2 Seconds Final   • INR 10/29/2017 1.06  0.90 - 1.10 Final   • PTT 10/29/2017 23.4  22.7 - 35.4 seconds Final   • Troponin T 10/29/2017 <0.010  0.000 - 0.030 ng/mL Final   • ABO Type 10/29/2017 O   Final   • RH type 10/29/2017 Positive   Final   • Antibody Screen 10/29/2017 Negative   Final   • Extra Tube 10/29/2017 hold for add-on   Final    Auto resulted   • Extra Tube 10/29/2017 Hold for add-ons.   Final    Auto resulted.   • Extra Tube 10/29/2017 hold for add-on   Final    Auto resulted   • Extra Tube 10/29/2017 Hold for add-ons.   Final    Auto resulted.   • WBC 10/29/2017 7.11  4.50 - 10.70 10*3/mm3 Final   • RBC 10/29/2017 4.30  3.90 - 5.20 10*6/mm3 Final   • Hemoglobin 10/29/2017 8.9* 11.9 - 15.5 g/dL Final   • Hematocrit 10/29/2017 30.9* 35.6 - 45.5 % Final   • MCV 10/29/2017 71.9* 80.5 - 98.2 fL Final   • MCH 10/29/2017 20.7* 26.9 - 32.0 pg Final   • MCHC 10/29/2017 28.8* 32.4 - 36.3 g/dL Final   • RDW 10/29/2017 18.1* 11.7 - 13.0 % Final   • RDW-SD 10/29/2017 47.8  37.0 - 54.0 fl Final   • MPV 10/29/2017 10.5  6.0 - 12.0 fL Final   • Platelets 10/29/2017 395  140 - 500 10*3/mm3 Final   • Neutrophil % 10/29/2017 58.9  42.7 - 76.0 % Final   • Lymphocyte % 10/29/2017 31.4  19.6 - 45.3 % Final   • Monocyte % 10/29/2017 8.6  5.0 - 12.0  % Final   • Eosinophil % 10/29/2017 0.7  0.3 - 6.2 % Final   • Basophil % 10/29/2017 0.4  0.0 - 1.5 % Final   • Immature Grans % 10/29/2017 0.0  0.0 - 0.5 % Final   • Neutrophils, Absolute 10/29/2017 4.19  1.90 - 8.10 10*3/mm3 Final   • Lymphocytes, Absolute 10/29/2017 2.23  0.90 - 4.80 10*3/mm3 Final   • Monocytes, Absolute 10/29/2017 0.61  0.20 - 1.20 10*3/mm3 Final   • Eosinophils, Absolute 10/29/2017 0.05  0.00 - 0.70 10*3/mm3 Final   • Basophils, Absolute 10/29/2017 0.03  0.00 - 0.20 10*3/mm3 Final   • Immature Grans, Absolute 10/29/2017 0.00  0.00 - 0.03 10*3/mm3 Final   • Anisocytosis 10/29/2017 Mod/2+  None Seen Final   • Elliptocytes 10/29/2017 Slight/1+  None Seen Final   • Hypochromia 10/29/2017 Large/3+  None Seen Final   • Poikilocytes 10/29/2017 Slight/1+  None Seen Final   • Schistocytes 10/29/2017 Slight/1+  None Seen Final   • Stomatocytes 10/29/2017 Mod/2+  None Seen Final   • WBC Morphology 10/29/2017 Normal  Normal Final   • Large Platelets 10/29/2017 Slight/1+  None Seen Final   • Vitamin B-12 10/29/2017 189* 211 - 946 pg/mL Final   • Hemoglobin A1C 10/29/2017 4.47* 4.80 - 5.60 % Final   • TSH 10/29/2017 1.800  0.270 - 4.200 mIU/mL Final   • Total Cholesterol 10/29/2017 179  0 - 200 mg/dL Final   • Triglycerides 10/29/2017 64  0 - 150 mg/dL Final   • HDL Cholesterol 10/29/2017 94* 40 - 60 mg/dL Final   • LDL Cholesterol  10/29/2017 72  0 - 100 mg/dL Final   • VLDL Cholesterol 10/29/2017 12.8  5 - 40 mg/dL Final   • LDL/HDL Ratio 10/29/2017 0.77   Final   • Glucose 10/29/2017 86  70 - 130 mg/dL Final       DIAGNOSTICS:  The CT MRI and MRA demonstrate a 15 mm left supraclinoid cerebral aneurysm associated with the carotid artery.  There is evidence of infarction in the distribution of a branch of the middle cerebral artery on the left side.  The infarct is in the motor strip laterally and the face area and then there is also an infarct in one gyrus anterior and sensory motor cortex more superiorly  and anteriorly and likely in the area of the arm.  The volume of the infarct is very small.  There is evidence of thrombus in the aneurysm.  This is a large cerebral aneurysm.    Results Review:   I reviewed the patient's new clinical results noted above.  I personally viewed and interpreted the patient's MRI and MRA and CT scan    Vital Signs   Heart Rate:  [83-86] 84  Resp:  [15] 15  BP: (116-122)/(74-80) 116/80    Physical Exam:  Physical Exam   Constitutional: She is oriented to person, place, and time. She appears well-developed and well-nourished. She is cooperative.   HENT:   Head: Normocephalic and atraumatic.   Eyes: EOM are normal. Pupils are equal, round, and reactive to light. No scleral icterus.   Neck: Normal range of motion. Neck supple.   Cardiovascular: Normal rate, regular rhythm and intact distal pulses.    No murmur heard.  Pulmonary/Chest: Effort normal and breath sounds normal.   Abdominal: Soft. Bowel sounds are normal. There is no tenderness.   Musculoskeletal: Normal range of motion.   Neurological: She is alert and oriented to person, place, and time. She has normal strength. She displays no atrophy. A cranial nerve deficit is present. No sensory deficit. She exhibits normal muscle tone. She has a normal Finger-Nose-Finger Test, a normal Heel to Shin Test, a normal Romberg Test and a normal Tandem Gait Test. She displays a negative Romberg sign. Gait normal. Coordination and gait normal. GCS eye subscore is 4. GCS verbal subscore is 5. GCS motor subscore is 6.   Reflex Scores:       Tricep reflexes are 1+ on the right side and 1+ on the left side.       Bicep reflexes are 1+ on the right side and 1+ on the left side.       Brachioradialis reflexes are 1+ on the right side and 1+ on the left side.       Patellar reflexes are 1+ on the right side and 1+ on the left side.       Achilles reflexes are 1+ on the right side and 1+ on the left side.  Negative Curtis and clonus  Finger to nose  intact   Heel to shin intact  Able to tandem walk  Able to walk on heels and toes   Skin: Skin is warm, dry and intact.   Psychiatric: She has a normal mood and affect. Her speech is normal and behavior is normal. Judgment and thought content normal. Cognition and memory are normal.   Vitals reviewed.    Neurologic Exam     Mental Status   Oriented to person, place, and time.   Registration: recalls 3 of 3 objects. Recall at 5 minutes: recalls 3 of 3 objects. Follows 3 step commands.   Attention: normal. Concentration: normal.   Speech: speech is normal   Level of consciousness: alert  Knowledge: good.   Able to name object. Able to read. Able to repeat. Able to write. Normal comprehension.     Cranial Nerves     CN II   Visual fields full to confrontation.     CN III, IV, VI   Pupils are equal, round, and reactive to light.  Extraocular motions are normal.   Right pupil: Consensual response: intact.   Left pupil: Consensual response: intact.   CN III: no CN III palsy  CN VI: no CN VI palsy  Nystagmus: right   Diplopia: none  Ophthalmoparesis: none  Upgaze: normal  Downgaze: normal  Conjugate gaze: present  Vestibulo-ocular reflex: present    CN V   Facial sensation intact.     CN VII   Facial expression full, symmetric.   Right facial weakness: central    CN VIII   CN VIII normal.     CN IX, X   CN IX normal.     CN XI   CN XI normal.     CN XII   CN XII normal.   Tongue deviation: right    Motor Exam   Muscle bulk: normal  Overall muscle tone: normal  Right arm tone: normal  Left arm tone: normal  Right arm pronator drift: absent  Left arm pronator drift: absent  Right leg tone: normal  Left leg tone: normal    Strength   Strength 5/5 throughout.   Right neck flexion: 5/5  Left neck flexion: 5/5  Right neck extension: 5/5  Left neck extension: 5/5  Right deltoid: 5/5  Left deltoid: 5/5  Right biceps: 5/5  Left biceps: 5/5  Right triceps: 5/5  Left triceps: 5/5  Right wrist flexion: 5/5  Left wrist flexion:  5/5  Right wrist extension: 5/5  Left wrist extension: 5/5  Right interossei: 5/5  Left interossei: 5/5  Right abdominals: 5/5  Left abdominals: 5/5  Right iliopsoas: 5/5  Left iliopsoas: 5/5  Right quadriceps: 5/5  Left quadriceps: 5/5  Right hamstrin/5  Left hamstrin/5  Right glutei: 5/5  Left glutei: 5/5  Right anterior tibial: 5/5  Left anterior tibial: 5/5  Right posterior tibial: 5/5  Left posterior tibial: 5/5  Right peroneal: 5/5  Left peroneal: 5/5  Right gastroc: 5/5  Left gastroc: 5/5    Sensory Exam   Light touch normal.   Vibration normal.   Proprioception normal.   Pinprick normal.     Gait, Coordination, and Reflexes     Gait  Gait: normal    Coordination   Romberg: negative  Finger to nose coordination: normal  Heel to shin coordination: normal  Tandem walking coordination: normal    Tremor   Resting tremor: absent  Intention tremor: absent  Action tremor: absent    Reflexes   Right brachioradialis: 1+  Left brachioradialis: 1+  Right biceps: 1+  Left biceps: 1+  Right triceps: 1+  Left triceps: 1+  Right patellar: 1+  Left patellar: 1+  Right achilles: 1+  Left achilles: 1+  Right : 1+  Left : 1+  Right plantar: normal  Left plantar: normal  Right Curtis: absent  Left Curtis: absent  Right ankle clonus: absent  Left ankle clonus: absent    NIHSS:    Baseline  0-->Alert: keenly responsive  0-->Answers both questions correctly  0-->Performs both tasks correctly  0=normal  0=No visual loss  1=Minor paralysis (flattened nasolabial fold, asymmetric on smiling)  0-->No drift: limb holds 90 (or 45) degrees for full 10 secs  0-->No drift: limb holds 90 (or 45) degrees for full 10 secs  0-->No drift: limb holds 90 (or 45) degrees for full 10 secs  0-->No drift: limb holds 90 (or 45) degrees for full 10 secs  0=Absent  0=Normal; no sensory loss  0=No aphasia, normal  1=Mild to moderate, patient slurs at least some words and at worst, can be understood with some difficulty  0=No  abnormality    Total score: 2    Assessment/Plan     Active Problems:    Intracranial aneurysm  Left middle cerebral artery embolism with stroke  Tobacco abuse - she continues to vaporize nicotine and inhalant.    PLAN: There is no evidence of subarachnoid hemorrhage.  The patient likely embolized the left middle cerebral artery clot within the large internal carotid artery partially thrombosed aneurysm.    I do not think that she is a candidate at this time for thrombolysis.  She is not a candidate for retrieval of clot.    This aneurysm is likely best treated with flow diverting.  I certainly like to let the clot and the aneurysm settles down.  I've gone ahead and ordered antiplatelet treatment and we will check the status of her antiplatelet treatment in a couple of days.    The risks, benefits and alternatives of cerebral angiography and placement of PIPELINE stent for use in the affected artery to prevent blood flow into the aneurysm and to support coil embolization (if needed) were explained in detail to the patient. The alternative is not to undergo this procedure. The benefit of cerebral angiography with embolization should be, though is not guaranteed, the elucidation of the vascular anatomy of the brain and the blood vessels leading to the brain, and placement of a series of coils into the aneurysm in order to prevent blood from flowing into it, thus potentially preventing rupture of the aneurysm. The benefit of PIPELINE stent is that it is less invasive than other surgical procedures, and provides a network of support for the coils and is useful for complex aneurysm. The risks of the procedure include, but are not limited to, the possibility of stroke, bleeding, blindness, damage to an artery in the brain, groin or neck (possibly requiring surgery to correct), infection, reaction to the contrast dye resulting in itching, swelling, anaphylaxis or even death, lack of ability to perform the procedure, need  for further operative intervention, lack of ability to completely obliterate the aneurysm, reopening of the aneurysm, need for further embolization treatment or even open surgery to obliterate the aneurysm, compaction of the coils within the aneurysm causing possible reopening or rupture, etcetera. The patient voiced understanding of these risks, benefits and alternatives.    I went over the various options of treatment available for unruptured aneurysms. I explained that an aneurysm greater than 7 mm in size carries a 1-2% risk of rupture on a yearly basis. I explained that the natural history of an aneurysm if it were to rupture included 30% of patients dying immediately. Of the survivors, 30% would die despite completely appropriate medical treatment, 30% would be severely disabled, and 30% would have a satisfactory result from treatment of a ruptured aneurysm. I also explained the risks, benefits and alternatives of treatment of an aneurysm from either an endovascular or open surgical procedure.    I went over the signs of rupture/leakage of aneurysm including sudden onset of severe headaches, vision problems, balance changes, speech difficulties, loss of consciousness, seizures, nausea/vomiting, etcetera and instructed the patient to seek immediate medical attention should these occur.    Plan: Plavix and aspirin.  Observation in ICU.  Non-thrombolytic stroke order set.    I discussed the patients findings and my recommendations with patient, family, nursing staff, primary care team, consulting provider and These include Dr. Aranda and Dr. Dunn and the patient's ICU nurse and the patient's .    Roger Day MD  10/29/17  3:37 PM

## 2017-11-02 NOTE — PLAN OF CARE
Problem: Patient Care Overview (Adult)  Goal: Plan of Care Review  Outcome: Ongoing (interventions implemented as appropriate)  PT stable, resting in bed comfortably.  Will transfer to ICU.    Problem: Perioperative Period (Adult)  Goal: Signs and Symptoms of Listed Potential Problems Will be Absent or Manageable (Perioperative Period)  Outcome: Ongoing (interventions implemented as appropriate)

## 2017-11-02 NOTE — PROGRESS NOTES
Hospital day #4 status post left middle cerebral artery stroke and discovery partially thrombosed large left internal carotid artery cerebral aneurysm.    Subjective: She denies any speech difficulties numbness tingling weakness etc.    Degenerative: There is mild right facial weakness otherwise there are no neurologic deficits.  A formal NIH stroke scale is equal to 1 because of very mild right facial weakness.    NIHSS:    7-10 days  0-->Alert: keenly responsive  0-->Answers both questions correctly  0-->Performs both tasks correctly  0=normal  0=No visual loss  1=Minor paralysis (flattened nasolabial fold, asymmetric on smiling)  0-->No drift: limb holds 90 (or 45) degrees for full 10 secs  0-->No drift: limb holds 90 (or 45) degrees for full 10 secs  0-->No drift: limb holds 90 (or 45) degrees for full 10 secs  0-->No drift: limb holds 90 (or 45) degrees for full 10 secs  0=Absent  0=Normal; no sensory loss  0=No aphasia, normal  0=Normal  0=No abnormality    Total score:1    P2Y12 and platelet function assay are both demonstrative of lack of aspirin and Plavix effect so far.    Her hemoglobin is low less than 8.  Her iron value is low.  This is likely iron deficiency anemia.    Assessment: Stable/improved neurologically.  Iron deficiency anemia.  So far lack of Plavix and aspirin efficacy.    Plan: We will load her both with aspirin and with Plavix.  We will recheck her P2Y12 and platelet function assay and if her P2Y12 assay still is not within inhibited range we will go ahead and switch her to a new or antiplatelet agent prior to placement of the pipeline stent today.

## 2017-11-02 NOTE — NURSING NOTE
Pt off floor for surgery, all belongings sent with family. Report given to Karley in OR, notified to call lab results to Theresa Townsend

## 2017-11-03 ENCOUNTER — TELEPHONE (OUTPATIENT)
Dept: NEUROSURGERY | Facility: CLINIC | Age: 42
End: 2017-11-03

## 2017-11-03 VITALS
RESPIRATION RATE: 18 BRPM | HEART RATE: 71 BPM | DIASTOLIC BLOOD PRESSURE: 73 MMHG | WEIGHT: 215.5 LBS | OXYGEN SATURATION: 100 % | SYSTOLIC BLOOD PRESSURE: 103 MMHG | TEMPERATURE: 98.7 F | BODY MASS INDEX: 34.63 KG/M2 | HEIGHT: 66 IN

## 2017-11-03 LAB
ACT BLD: 120 SECONDS (ref 82–152)
ACT BLD: 153 SECONDS (ref 82–152)
ACT BLD: 98 SECONDS (ref 82–152)
DEPRECATED RDW RBC AUTO: 52.8 FL (ref 37–54)
ERYTHROCYTE [DISTWIDTH] IN BLOOD BY AUTOMATED COUNT: 18.8 % (ref 11.7–13)
HCT VFR BLD AUTO: 26.3 % (ref 35.6–45.5)
HGB BLD-MCNC: 7.2 G/DL (ref 11.9–15.5)
MCH RBC QN AUTO: 21 PG (ref 26.9–32)
MCHC RBC AUTO-ENTMCNC: 27.4 G/DL (ref 32.4–36.3)
MCV RBC AUTO: 76.7 FL (ref 80.5–98.2)
PA ADP PRP-ACNC: 39 PRU (ref 194–418)
PLATELET # BLD AUTO: 345 10*3/MM3 (ref 140–500)
PMV BLD AUTO: 11.1 FL (ref 6–12)
RBC # BLD AUTO: 3.43 10*6/MM3 (ref 3.9–5.2)
WBC NRBC COR # BLD: 8.99 10*3/MM3 (ref 4.5–10.7)

## 2017-11-03 PROCEDURE — G0009 ADMIN PNEUMOCOCCAL VACCINE: HCPCS | Performed by: INTERNAL MEDICINE

## 2017-11-03 PROCEDURE — 90732 PPSV23 VACC 2 YRS+ SUBQ/IM: CPT | Performed by: INTERNAL MEDICINE

## 2017-11-03 PROCEDURE — 85576 BLOOD PLATELET AGGREGATION: CPT | Performed by: NEUROLOGICAL SURGERY

## 2017-11-03 PROCEDURE — 85027 COMPLETE CBC AUTOMATED: CPT | Performed by: INTERNAL MEDICINE

## 2017-11-03 PROCEDURE — 90661 CCIIV3 VAC ABX FR 0.5 ML IM: CPT | Performed by: INTERNAL MEDICINE

## 2017-11-03 PROCEDURE — 99254 IP/OBS CNSLTJ NEW/EST MOD 60: CPT | Performed by: INTERNAL MEDICINE

## 2017-11-03 PROCEDURE — 25010000002 INFLUENZA VAC SUBUNIT QUAD 0.5 ML SUSPENSION PREFILLED SYRINGE: Performed by: INTERNAL MEDICINE

## 2017-11-03 PROCEDURE — 25810000003 SODIUM CHLORIDE 0.9 % WITH KCL 20 MEQ 20-0.9 MEQ/L-% SOLUTION: Performed by: NEUROLOGICAL SURGERY

## 2017-11-03 PROCEDURE — 63710000001 DIPHENHYDRAMINE PER 50 MG: Performed by: INTERNAL MEDICINE

## 2017-11-03 PROCEDURE — 25010000002 IRON SUCROSE PER 1 MG: Performed by: INTERNAL MEDICINE

## 2017-11-03 PROCEDURE — 86900 BLOOD TYPING SEROLOGIC ABO: CPT

## 2017-11-03 PROCEDURE — 99231 SBSQ HOSP IP/OBS SF/LOW 25: CPT | Performed by: NEUROLOGICAL SURGERY

## 2017-11-03 PROCEDURE — G0008 ADMIN INFLUENZA VIRUS VAC: HCPCS | Performed by: INTERNAL MEDICINE

## 2017-11-03 PROCEDURE — 36430 TRANSFUSION BLD/BLD COMPNT: CPT

## 2017-11-03 PROCEDURE — 25010000002 PNEUMOCOCCAL VAC POLYVALENT PER 0.5 ML: Performed by: INTERNAL MEDICINE

## 2017-11-03 PROCEDURE — P9016 RBC LEUKOCYTES REDUCED: HCPCS

## 2017-11-03 RX ORDER — ASPIRIN 325 MG
325 TABLET, DELAYED RELEASE (ENTERIC COATED) ORAL DAILY
Status: DISCONTINUED | OUTPATIENT
Start: 2017-11-03 | End: 2017-11-03 | Stop reason: HOSPADM

## 2017-11-03 RX ORDER — DIPHENHYDRAMINE HCL 25 MG
50 CAPSULE ORAL ONCE
Status: COMPLETED | OUTPATIENT
Start: 2017-11-03 | End: 2017-11-03

## 2017-11-03 RX ORDER — DIPHENHYDRAMINE HCL 25 MG
25 CAPSULE ORAL ONCE
Status: CANCELLED | OUTPATIENT
Start: 2017-11-07

## 2017-11-03 RX ORDER — SODIUM CHLORIDE 9 MG/ML
250 INJECTION, SOLUTION INTRAVENOUS ONCE
Status: CANCELLED | OUTPATIENT
Start: 2017-11-07

## 2017-11-03 RX ORDER — ATORVASTATIN CALCIUM 40 MG/1
40 TABLET, FILM COATED ORAL NIGHTLY
Qty: 30 TABLET | Refills: 1 | Status: SHIPPED | OUTPATIENT
Start: 2017-11-03 | End: 2019-02-15

## 2017-11-03 RX ORDER — ACETAMINOPHEN 325 MG/1
650 TABLET ORAL ONCE
Status: COMPLETED | OUTPATIENT
Start: 2017-11-03 | End: 2017-11-03

## 2017-11-03 RX ADMIN — IRON SUCROSE 300 MG: 20 INJECTION, SOLUTION INTRAVENOUS at 10:25

## 2017-11-03 RX ADMIN — CYANOCOBALAMIN TAB 500 MCG 1000 MCG: 500 TAB at 09:52

## 2017-11-03 RX ADMIN — ASPIRIN 325 MG: 325 TABLET ORAL at 09:52

## 2017-11-03 RX ADMIN — POTASSIUM CHLORIDE AND SODIUM CHLORIDE 60 ML/HR: 900; 150 INJECTION, SOLUTION INTRAVENOUS at 00:48

## 2017-11-03 RX ADMIN — ACETAMINOPHEN 650 MG: 325 TABLET ORAL at 09:52

## 2017-11-03 RX ADMIN — A/SINGAPORE/GP1908/2015 IVR-180 (H1N1) (AN A/MICHIGAN/45/2015-LIKE VIRUS), A/SINGAPORE/GP2050/2015 (H3N2) (AN A/HONG KONG/4801/2014 - LIKE VIRUS), B/UTAH/9/2014 (A B/PHUKET/3073/2013-LIKE VIRUS), B/HONG KONG/259/2010 (A B/BRISBANE/60/08-LIKE VIRUS) 0.5 ML: 15; 15; 15; 15 INJECTION, SUSPENSION INTRAMUSCULAR at 15:23

## 2017-11-03 RX ADMIN — TICAGRELOR 60 MG: 60 TABLET ORAL at 09:59

## 2017-11-03 RX ADMIN — DIPHENHYDRAMINE HYDROCHLORIDE 50 MG: 25 CAPSULE ORAL at 09:52

## 2017-11-03 RX ADMIN — PNEUMOCOCCAL VACCINE POLYVALENT 0.5 ML
25; 25; 25; 25; 25; 25; 25; 25; 25; 25; 25; 25; 25; 25; 25; 25; 25; 25; 25; 25; 25; 25; 25 INJECTION, SOLUTION INTRAMUSCULAR; SUBCUTANEOUS at 15:51

## 2017-11-03 NOTE — TELEPHONE ENCOUNTER
Patient is scheduled with [facility] on 11/13 1030, 1000 arrival time. Called ICU, spoke with Jennifer, given information re: appt.  Letter/forms mailed.

## 2017-11-03 NOTE — TELEPHONE ENCOUNTER
----- Message from Barbara A Sturgeon, MA sent at 11/3/2017  3:38 PM EDT -----  Regarding: FW: hfu      ----- Message -----     From: GRIS Carr     Sent: 11/3/2017  11:29 AM       To: Barbara A Sturgeon, MA  Subject: hfu                                              Pt seen for LICA aneurysm and Left MCA stroke  by Dr. Roger Day on October 29, 2017. Need follow up in 2 week(s)  without imaging. Ok to see NP.

## 2017-11-03 NOTE — DISCHARGE SUMMARY
PHYSICIAN DISCHARGE SUMMARY                                                                        [facility]    Patient Identification:  Name: Mariaelena Mack  Age: 42 y.o.  Sex: female  :  1975  MRN: [id number]  Primary Care Physician: India Stubbs MD    Admit date: 10/29/2017  Discharge date and time: No discharge date for patient encounter.   Discharged Condition: stable    Discharge Diagnoses:Principal Problem:    Left middle cerebral artery stroke  Active Problems:    Intracranial aneurysm    Iron deficiency anemia       Hospital Course: Mariaelena Mack presented to [facility]    Very pleasant 42-year-old female ex-smoker admitted through the emergency room with sudden onset of right-sided facial weakness.  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  Patient states earlier she had headache and some nausea which has since resolved.  Currently other than facial weakness he has no other neurological deficit.  Denies any chest pain or shortness of breath.   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  She should follow-up with us in approximately 2 weeks.  We will arrange appropriate imaging follow-up.  She will need to remain on Brilinta and full dose aspirin-325 mg daily.  She apparently is not a Plavix responder.  She will continue on Brilinta and aspirin for as long as the aneurysm remains patent.  We will repeat a cerebral arteriogram at 6 months from her date of surgery.    She does have a significant iron deficiency anemia.  This requires workup and will need to be monitored as well.  She may require a endometrial ablation  for her menorrhagia.  I would prefer to wait approximately 6-8 weeks following her stroke prior to her undergoing any further relatively elective procedures.  She is to follow-up with Dr. Jay, OB/GYN as an outpatient.     She also has a moderate size patent foramen ovale.  Certainly the patent foramen ovale could be the source of her stroke.  She's been evaluated by cardiology.  Follow-up with cardiology should be in 4-6 weeks.  We will repeat hemoglobin and if it's stable and greater than 7.5 to be discharged home today.  Consults:   IP CONSULT TO NEUROLOGY  IP CONSULT TO NEUROLOGY  IP CONSULT TO NEUROSURGERY  IP CONSULT TO PULMONOLOGY  IP CONSULT TO NEURO CLINICAL SPECIALIST  IP CONSULT TO REHAB ADMISSION  IP CONSULT TO CASE MANAGEMENT   IP CONSULT TO CASE MANAGEMENT   IP CONSULT TO CARDIOLOGY  IP CONSULT TO OB GYN  IP CONSULT TO HEMATOLOGY AND ONCOLOGY    Significant Diagnostic Studies:   [unfilled]    Discharge Exam:  Alert and oriented x 4, in NAD  Supple neck, midline trach  RRR, no m/r/g, no edema  Clear bilaterally, no wheezing, nonlabored  No clubbing or cyanosis     Disposition:  Home    Patient Instructions:   [unfilled]  Follow-up Information     Follow up with William Patino MD. Schedule an appointment as soon as possible for a visit in 4 week(s).    Specialty:  Cardiology    Contact information:    [address]  [facility] [address]  [place] [address]  [phone]          Follow up with Roger Day MD Follow up in 2 week(s).    Specialties:  Neurosurgery, Radiology    Why:  our office will arrange and call pt    Contact information:    [address]  [facility] [address]  [place] [address]  [phone]          [unfilled]     Medication Reconciliation: Please see electronically completed Med Rec.    Total time spent discharging patient including evaluation, medication reconciliation, arranging follow up, and post hospitalization instructions and education total  time exceeds 30 minutes.    Signed:  Ethan Jones MD  11/3/2017  12:22 PM

## 2017-11-03 NOTE — PROGRESS NOTES
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    Subjective: The patient denies headache visual disturbances numbness tingling weakness speech issues facial movement problems sensory disturbances etc.    Exam: She remains with a very minimal right facial weakness with nasolabial fold flattening that is less than previously.  There is no drift or tremor.  Sensation is intact pinprick light after his incision proprioception throughout.  Rapid alternating movements are normal.  There is no dysmetria.    Assessment: Patient doing well postoperative day #1.    Plan: From the neurosurgery is all right for discharge.  She should follow-up with us in approximately 2 weeks.  We will arrange appropriate imaging follow-up.  She will need to remain on Brilinta and full dose aspirin-325 mg daily.  She apparently is not a Plavix responder.  She will continue on Brilinta and aspirin for as long as the aneurysm remains patent.  We will repeat a cerebral arteriogram at 6 months from her date of surgery.    She does have a significant iron deficiency anemia.  This requires workup and will need to be monitored as well.  She may require a endometrial ablation for her menorrhagia.  I would prefer to wait approximately 6-8 weeks following her stroke prior to her undergoing any further relatively elective procedures.  She is to follow-up with Dr. Jay, OB/GYN as an outpatient.    She also has a moderate size patent foramen ovale.  Certainly the patent foramen ovale could be the source of her stroke.  She's been evaluated by cardiology.  Follow-up with cardiology should be in 4-6 weeks.

## 2017-11-03 NOTE — PLAN OF CARE
Problem: Patient Care Overview (Adult)  Goal: Plan of Care Review  Outcome: Ongoing (interventions implemented as appropriate)    11/03/17 0529   Outcome Evaluation   Outcome Summary/Follow up Plan pt did well thro' the night,hemodynamically stable,neuro intact,NIHS 0,tolerated diet well,no c/o pain or headache,dorantes dc'd,due to void,possible overflow today   Coping/Psychosocial Response Interventions   Plan Of Care Reviewed With patient   Patient Care Overview   Progress improving         Problem: Stroke (Ischemic) (Adult)  Goal: Signs and Symptoms of Listed Potential Problems Will be Absent or Manageable (Stroke)  Outcome: Ongoing (interventions implemented as appropriate)    11/03/17 0529   Stroke (Ischemic)   Problems Assessed (Stroke (Ischemic)/TIA) venous thromboembolism;situational response   Problems Present (Stroke (Ischemic)/TIA) venous thromboembolism;situational response         Problem: Fall Risk (Adult)  Goal: Identify Related Risk Factors and Signs and Symptoms  Outcome: Ongoing (interventions implemented as appropriate)    11/03/17 0529   Fall Risk   Fall Risk: Related Risk Factors depression/anxiety       Goal: Absence of Falls  Outcome: Ongoing (interventions implemented as appropriate)    11/03/17 0529   Fall Risk (Adult)   Absence of Falls making progress toward outcome

## 2017-11-03 NOTE — CONSULTS
[facility] GROUP INITIAL INPATIENT CONSULTATION NOTE    REASON FOR CONSULTATION:  Severe iron deficiency anemia    RECORDS OBTAINED: Records of the patients history including those obtained from the referring provider were reviewed and summarized in detail.    HISTORY OF PRESENT ILLNESS:  Mariaelena Mack is a 42 y.o. female who we are asked to see today in consultation for severe iron deficiency anemia.  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  She was initiated on aspirin and Plavix however was felt that she wasn't having significant antiplatelet benefit from the Plavix and then was loaded with Brilenta.  She then went for pipeline embolization on November 2, 2017.  She will be discharged on Brilenta and full dose aspirin.  Thankfully she has very minimal residual deficits from her stroke.    We've been asked to consult because of her severe iron deficiency anemia.  Patient has significant menorrhagia.  She has a known diagnosis of fibroids.  She has been seen by her gynecologist and they're discussing treatment options but leaning towards ablation.  She has taken oral iron before the form of a multivitamin with iron and had GI discomfort with that.  She denies any chest pain or shortness of breath.    Past Medical   Past Medical History:   Diagnosis Date   • Bulging of thoracic intervertebral disc     over 20 yrs ago   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     Social History   Social History     Social History   • Marital status: Unknown     Spouse name: N/A   • Number of children: N/A   • Years of education: N/A     Occupational History   • Not on file.     Social History Main Topics   • Smoking status: Current Every Day Smoker     Packs/day: 0.00     Types: Electronic Cigarette     Last  attempt to quit: 2015   • Smokeless tobacco: Never Used      Comment: uses Vaporizor   • Alcohol use 12.0 oz/week     20 Cans of beer per week      Comment: weekends   • Drug use: No   • Sexual activity: Yes     Partners: Male     Birth control/ protection: None     Other Topics Concern   • Not on file     Social History Narrative    Patient lives with  and socially drinks fairly heavily most weekends. States 6-8 beers on weekend nights. Less on weekdays.     Family History  Family History   Problem Relation Age of Onset   • Migraines Mother    • Heart failure Mother      Medications    Current Facility-Administered Medications:   •  acetaminophen (TYLENOL) tablet 650 mg, 650 mg, Oral, Q4H PRN **OR** acetaminophen (TYLENOL) suppository 650 mg, 650 mg, Rectal, Q4H PRN, Roger Day MD  •  acetaminophen (TYLENOL) tablet 650 mg, 650 mg, Oral, Once **AND** diphenhydrAMINE (BENADRYL) capsule 50 mg, 50 mg, Oral, Once **AND** iron sucrose (VENOFER) 300 mg in sodium chloride 0.9 % 250 mL IVPB, 300 mg, Intravenous, Once, Sandy Alicea MD  •  aspirin chewable tablet 81 mg, 81 mg, Oral, Daily, Roger Day MD, 81 mg at 11/02/17 0836  •  aspirin tablet 650 mg, 650 mg, Oral, Daily, Roger Day MD, 650 mg at 11/02/17 0940  •  atorvastatin (LIPITOR) tablet 40 mg, 40 mg, Oral, Nightly, Abena Beth MD, 40 mg at 11/02/17 2021  •  clonazePAM (KlonoPIN) tablet 0.5 mg, 0.5 mg, Oral, BID PRN, Abena Beth MD, 0.5 mg at 10/31/17 2041  •  clopidogrel (PLAVIX) tablet 75 mg, 75 mg, Oral, Daily, Roger Day MD, 75 mg at 11/02/17 0837  •  docusate sodium (COLACE) capsule 100 mg, 100 mg, Oral, BID, Roger Day MD, 100 mg at 11/02/17 2000  •  Influenza Vac Subunit Quad (FLUCELVAX) injection 0.5 mL, 0.5 mL, Intramuscular, During Hospitalization, Tausif Sayied, MD  •  lactated ringers infusion, 9 mL/hr, Intravenous, Continuous, Brennen Helms MD, Last Rate: 9 mL/hr at 11/02/17 1238  •  morphine  injection 2 mg, 2 mg, Intravenous, Q4H PRN **AND** naloxone (NARCAN) injection 0.4 mg, 0.4 mg, Intravenous, Q5 Min PRN, Roger Day MD  •  niCARdipine (CARDENE) 25 mg/250 mL (0.1 mg/mL) 0.9% NS VTB infusion, 5-15 mg/hr, Intravenous, Titrated, Roger Day MD  •  ondansetron (ZOFRAN) injection 4 mg, 4 mg, Intravenous, Q6H PRN, Roger Day MD  •  pneumococcal polysaccharide 23-valent (PNEUMOVAX-23) vaccine 0.5 mL, 0.5 mL, Intramuscular, During Hospitalization, Ethan Jones MD  •  sodium chloride 0.9 % flush 1-10 mL, 1-10 mL, Intravenous, PRN, Abena Beth MD  •  sodium chloride 0.9 % infusion, 75 mL/hr, Intravenous, Continuous, Roger Day MD, Last Rate: 75 mL/hr at 11/02/17 0850, 75 mL/hr at 11/02/17 0850  •  sodium chloride 0.9 % with KCl 20 mEq/L infusion, 60 mL/hr, Intravenous, Continuous, Roger Day MD, Last Rate: 60 mL/hr at 11/03/17 0048, 60 mL/hr at 11/03/17 0048  •  ticagrelor (BRILINTA) tablet tablet 60 mg, 60 mg, Oral, BID, Roger Day MD, 60 mg at 11/02/17 1741  •  topiramate (TOPAMAX) tablet 25 mg, 25 mg, Oral, Nightly, Abena Beth MD, 25 mg at 11/02/17 2021  •  vitamin B-12 (CYANOCOBALAMIN) tablet 1,000 mcg, 1,000 mcg, Oral, Daily, Abena Beth MD, Stopped at 11/02/17 0858  Allergies  No Known Allergies  Review of Systems  Fourteen point review of systems performed.  Positive pertinents identified above in history of presenting illness; all remaining systems negative.       Objective:     Vitals:    11/03/17 0703   BP: 95/65   Pulse: 66   Resp:    Temp:    SpO2: 100%     GENERAL:  Well-developed, well-nourished female in no acute distress.   SKIN:  Warm, dry without rashes, purpura or petechiae.  HEAD:  Normocephalic.  EYES:  Pupils equal, round and reactive to light.  EOMs intact.  Conjunctivae normal.  EARS:  Hearing intact.  NOSE:  Septum midline.  No excoriations or nasal discharge.  MOUTH:  Tongue is well-papillated; no stomatitis or  ulcers.  Lips normal. Right facial weakness  THROAT:  Oropharynx without lesions or exudates.  NECK:  Supple with good range of motion; no thyromegaly or masses  LYMPHATICS:  No cervical, supraclavicular, axillary adenopathy.  CHEST:  Lungs clear to percussion and auscultation. Good airflow.  CARDIAC:  Regular rate and rhythm without murmurs, rubs or gallops. Normal S1,S2.  ABDOMEN:  Soft, nontender, normal bowel sounds  EXTREMITIES:  No clubbing, cyanosis or edema.  NEUROLOGICAL:  Right facial weakness but no other significant deficits.   PSYCHIATRIC:  Normal affect and mood.        DIAGNOSTIC DATA:    Results from last 7 days  Lab Units 11/01/17  0600 10/31/17  1009 10/30/17  0420 10/29/17  1228   WBC 10*3/mm3 6.06  --  6.16 7.11   HEMOGLOBIN g/dL 7.5* 8.5* 7.4* 8.9*   HEMATOCRIT % 27.2* 30.1* 26.6* 30.9*   PLATELETS 10*3/mm3 320  --  372 395       Results from last 7 days  Lab Units 11/01/17  0600 10/30/17  0420 10/29/17  1228   SODIUM mmol/L 142 139 138   POTASSIUM mmol/L 3.7 4.3 3.5   CHLORIDE mmol/L 109* 107 103   CO2 mmol/L 18.4* 18.4* 21.0*   BUN mg/dL 9 8 9   CREATININE mg/dL 0.65 0.66 0.69   CALCIUM mg/dL 8.5* 8.5* 9.3   BILIRUBIN mg/dL 0.3  --  0.5   ALK PHOS U/L 32*  --  40   ALT (SGPT) U/L 7  --  10   AST (SGOT) U/L 13  --  17   GLUCOSE mg/dL 95 85 113*     Assessment/Plan   Assessment/Plan:   This is a 42 y.o. female with:    1. Iron deficiency anemia secondary to menorrhagia, unresponsive to oral iron   * We'll give 1 dose of Venofer 300 mg while inpatient and is scheduled for 2 doses of IV Feraheme in the office.   * We will proceed with 1 unit packed RBC transfusion prior to discharge.    2. Menorrhagia   * Following with gynecology.     3. 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  She was then loaded with Brilenta and then went for pipeline  embolization on 11/2/2017   * She will be discharged on Brilenta and full dose aspirin    Patient is going to be discharged today.  Again I have arranged follow-up office for next week and the week after. Thanks for consult.            Sandy Alicea MD

## 2017-11-04 LAB
ABO + RH BLD: NORMAL
BH BB BLOOD EXPIRATION DATE: NORMAL
BH BB BLOOD TYPE BARCODE: 5100
BH BB DISPENSE STATUS: NORMAL
BH BB PRODUCT CODE: NORMAL
BH BB UNIT NUMBER: NORMAL
UNIT  ABO: NORMAL
UNIT  RH: NORMAL

## 2017-11-06 DIAGNOSIS — D50.0 IRON DEFICIENCY ANEMIA DUE TO CHRONIC BLOOD LOSS: Primary | ICD-10-CM

## 2017-11-06 PROBLEM — T45.4X5A IRON AND ITS COMPOUNDS CAUSING ADVERSE EFFECT IN THERAPEUTIC USE: Status: ACTIVE | Noted: 2017-11-06

## 2017-11-06 NOTE — PROGRESS NOTES
Case Management Discharge Note    Final Note: Per notes, pt was discharged to home with family.    Discharge Placement     No information found             Discharge Codes: 01  Discharge to home

## 2017-11-06 NOTE — PAYOR COMM NOTE
"Joy Andersen (42 y.o. Female)              ATTENTION; SHAYNE MAURER FOR YOUR REVIEW, 11/1-11/3  REF [id number]          REPLY TO [facility] DEPT, BILL CALL  [phone] OR UR  [phone]           REQUESTING CALL OR FAX CONFIRMING ALL DAYS AUTHORIZED . THANK YOU       Date of Birth Social Security Number Address Home Phone MRN    1975  [id number] [facility] [address] [phone] [id number]    Holiness Marital Status          Unknown Unknown       Admission Date Admission Type Admitting Provider Attending Provider Department, Room/Bed    10/29/17 Emergency Ethan Jones MD  [facility] INTENSIVE CARE, 389/1    Discharge Date Discharge Disposition Discharge Destination        11/3/2017 Home or Self Care             Attending Provider: (none)    Allergies:  No Known Allergies    Isolation:  None   Infection:  None   Code Status:  Prior    Ht:  66\" (167.6 cm)   Wt:  215 lb 8 oz (97.8 kg)    Admission Cmt:  None   Principal Problem:  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]                 Active Insurance as of 10/29/2017     Primary Coverage     Payor Plan Insurance Group Employer/Plan Group    [email or web address] BLUE CROSS [employer] BLUE CROSS BLUE SHIELD PPO [id number]     Payor Plan Address Payor Plan Phone Number Effective From Effective To    [address] [phone] 1/1/2010     [place] [address]       Subscriber Name Subscriber Birth Date Member ID       JOY ANDERSEN 1975 [id number]                 Emergency Contacts      (Rel.) Home Phone Work Phone Mobile Phone    Silvestre Andersen (Spouse) -- -- [phone]             Progress Notes  Date of Service: 11/1/2017 12:04 PM  GRIS Witt   Neurosurgery   Cosigned by: Roger Day MD at 11/1/2017  2:24 PM   Attestation signed by Roger Day MD at 11/1/2017 2:24 PM   I have reviewed the documentation above and agree.     The risks, benefits and alternatives of " cerebral angiography and placement of PIPELINE stent for use in the affected artery to prevent blood flow into the aneurysm and to support coil embolization (if needed) were explained in detail to the patient. The alternative is not to undergo this procedure. The benefit of cerebral angiography with embolization should be, though is not guaranteed, the elucidation of the vascular anatomy of the brain and the blood vessels leading to the brain, and placement of a series of coils into the aneurysm in order to prevent blood from flowing into it, thus potentially preventing rupture of the aneurysm. The benefit of PIPELINE stent is that it is less invasive than other surgical procedures, and provides a network of support for the coils and is useful for complex aneurysm. The risks of the procedure include, but are not limited to, the possibility of stroke, bleeding, blindness, damage to an artery in the brain, groin or neck (possibly requiring surgery to correct), infection, reaction to the contrast dye resulting in itching, swelling, anaphylaxis or even death, lack of ability to perform the procedure, need for further operative intervention, lack of ability to completely obliterate the aneurysm, reopening of the aneurysm, need for further embolization treatment or even open surgery to obliterate the aneurysm, compaction of the coils within the aneurysm causing possible reopening or rupture, etcetera. The patient voiced understanding of these risks, benefits and alternatives and requests that we proceed with cerebral angiography with coil embolization and stent placement.     The risks, benefits and alternatives of cerebral angiography with embolization of the aneurysm were explained in detail to the patient. The alternative is not to undergo this procedure. The benefit of cerebral angiography with embolization should be, though is not guaranteed, the elucidation of the vascular anatomy of the brain and the blood vessels  leading to the brain, and placement of a series of coils into the aneurysm in order to prevent blood from flowing into it, thus potentially preventing rupture of the aneurysm. The risks of the procedure include, but are not limited to, the possibility of stroke, bleeding, blindness, damage to an artery in the brain, groin or neck (possibly requiring surgery to correct), infection, reaction to the contrast dye resulting in itching, swelling, anaphylaxis or even death, lack of ability to perform the procedure, need for further operative intervention, lack of ability to completely obliterate the aneurysm, reopening of the aneurysm, need for further embolization treatment or even open surgery to obliterate the aneurysm, compaction of the coils within the aneurysm causing possible reopening or rupture, etcetera. The patient voiced understanding of the risks, benefits and alternatives and requests that we proceed with cerebral angiography with embolization of aneurysm.     The patient and her  and mother voiced understanding of the realistic risks of this operative intervention.         Expand All Collapse All    []Hide copied text                                              [facility] ADVANCED NEUROSURGERY PROGRESS NOTE     PATIENT IDENTIFICATION:                        Name:  Mariaelena Mack                                                                                                                            MRN:  [id number]                                               42 y.o.  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        Objective         Vital signs in last  24 hours:  Temp:  [98.2 °F (36.8 °C)-98.7 °F (37.1 °C)] 98.4 °F (36.9 °C)  Heart Rate:  [65-98] 78  Resp:  [14-16] 16  BP: ()/(53-87) 105/73  ICP ranges-     Intake/Output last 3 shifts:        Intake/Output this shift:           Physical Exam:  General:                                                               Awake, alert, and oriented x 3. NAD  Appears well  CN III IV VI:               Extraocular movements are full , PERRL   CN V:            Normal facial sensation and strength of muscles of mastication.  CN VII:                      Facial movements appear symmetric. No weakness.     Motor:           Normal muscle strength, bulk and tone in upper and lower extremities.  No fasciculations, rigidity, spasticity, or abnormal movements.  Reflexes:                  2+ in the upper and lower extremities. Plantar responses are flexor.  Sensation:                Normal to light touch, pinprick, vibration, temperature, and proprioception in arms and legs. Normal graphesthesia and no extinction on DSS.  Station and Gait:                 Normal gait and station.    Coordination:                      Finger to nose test shows no dysmetria.  Rapid alternating movements are normal.  Heel to shin normal.  Extremities:             Patient is wearing GUSTAVO and SCD bilaterally     LABS:     Lab Results (last 24 hours)     Procedure Component Value Units Date/Time             P2Y12 Platelet Inhibition [[id number]]  (Normal) Collected:  10/31/17 1403     Specimen:  Blood Updated:  10/31/17 1440       P2Y12 Platelet Inhibition 280 PRU       Narrative:        Test results are in P2Y12 reaction units (PRU). This measures the extent of platelet aggregation in the presence of P2Y12 inhibitor drugs such as clopidrogrel (Plavix), prasugrel (Effient), ticagrelor (Brilinta), and ticlopidine (Ticlid).   Pre-Drug normal reference range: 194 - 418 PRU   P2Y12 values <194 (low end of reference range) are specific evidence of a P2Y12  inhibitor effect   Patients who have been treated with Glycoprotein IIb/IIIa inhibitors should not be tested until platelet function has recovered. This time period is approximately 14 days after discontinuation of abciximab (ReoPro) and up to 48 hours after discontinuation of eptifibatide (Integrillin) and tirofiban (Aggratstat).   Results should be interpreted in conjuction with other laboratory and clinical data available to the clinician.     CBC (No Diff) [[id number]]  (Abnormal) Collected:  11/01/17 0600     Specimen:  Blood Updated:  11/01/17 0651       WBC 6.06 10*3/mm3         RBC 3.61 (L) 10*6/mm3         Hemoglobin 7.5 (L) g/dL         Hematocrit 27.2 (L) %         MCV 75.3 (L) fL         MCH 20.8 (L) pg         MCHC 27.6 (L) g/dL         RDW 18.1 (H) %         RDW-SD 49.8 fl         MPV 11.0 fL         Platelets 320 10*3/mm3       Comprehensive Metabolic Panel [[id number]]  (Abnormal) Collected:  11/01/17 0600     Specimen:  Blood Updated:  11/01/17 0711       Glucose 95 mg/dL         BUN 9 mg/dL         Creatinine 0.65 mg/dL         Sodium 142 mmol/L         Potassium 3.7 mmol/L         Chloride 109 (H) mmol/L         CO2 18.4 (L) mmol/L         Calcium 8.5 (L) mg/dL         Total Protein 6.1 g/dL         Albumin 3.70 g/dL         ALT (SGPT) 7 U/L         AST (SGOT) 13 U/L         Alkaline Phosphatase 32 (L) U/L         Total Bilirubin 0.3 mg/dL         eGFR Non African Amer 100 mL/min/1.73         Globulin 2.4 gm/dL         A/G Ratio 1.5 g/dL         BUN/Creatinine Ratio 13.8       Anion Gap 14.6 mmol/L              IMAGING STUDIES:     No new imaging        Meds reviewed/changed: Yes      Current Medications              Current Facility-Administered Medications   Medication Dose Route Frequency Provider Last Rate Last Dose   • acetaminophen (TYLENOL) tablet 650 mg  650 mg Oral Q4H PRN Roger Day MD           Or   • acetaminophen (TYLENOL) suppository 650 mg  650 mg Rectal Q4H PRJYOTSNA KINCAID  MD Barb         • aspirin chewable tablet 81 mg  81 mg Oral Daily Roger Day MD    81 mg at 11/01/17 0922   • atorvastatin (LIPITOR) tablet 40 mg  40 mg Oral Nightly Abena Beth MD    40 mg at 10/31/17 2041   • clonazePAM (KlonoPIN) tablet 0.5 mg  0.5 mg Oral BID PRN Abena Beth MD    0.5 mg at 10/31/17 2041   • clopidogrel (PLAVIX) tablet 75 mg  75 mg Oral Daily Roger Day MD    75 mg at 11/01/17 0838   • docusate sodium (COLACE) capsule 100 mg  100 mg Oral BID Roger Day MD    100 mg at 11/01/17 0838   • Influenza Vac Subunit Quad (FLUCELVAX) injection 0.5 mL  0.5 mL Intramuscular During Hospitalization Ethan Jones MD         • niCARdipine (CARDENE) 25 mg/250 mL (0.1 mg/mL) 0.9% NS VTB infusion  5-15 mg/hr Intravenous Titrated Roger Day MD         • ondansetron (ZOFRAN) injection 4 mg  4 mg Intravenous Q6H PRN Roger Day MD         • pneumococcal polysaccharide 23-valent (PNEUMOVAX-23) vaccine 0.5 mL  0.5 mL Intramuscular During Hospitalization Ethan Jones MD         • sodium chloride 0.9 % flush 1-10 mL  1-10 mL Intravenous PRN Abena Beth MD         • topiramate (TOPAMAX) tablet 25 mg  25 mg Oral Nightly Abena Beth MD    25 mg at 10/31/17 2041   • vitamin B-12 (CYANOCOBALAMIN) tablet 1,000 mcg  1,000 mcg Oral Daily Abena Beth MD    1,000 mcg at 11/01/17 0921               Assessment/Plan         ASSESSMENT:     Principal Problem:    Left middle cerebral artery stroke  Active Problems:    Intracranial aneurysm        PLAN: Dr Day had a long talk with the patient and her family last night. We'll plan for cerebral angiogram tomorrow or Friday. Long talk with the patient and her family regarding specifics of the cerebral angiogram procedure.  She appears to be back at baseline with the exception of minimal right facial and mouth weakness.     I discussed the patients findings and my recommendations with patient, family, nursing  "staff and Dr Day         LOS: 3 days         Celina Liriano, APRN  11/1/2017  12:04 PM                       Progress Notes  Date of Service: 11/1/2017 4:59 PM  Ethan Jones MD   Pulmonology      []Hide copied text                                                                                                                                      [facility]                                                                                                                                                            Dr Ethan Jones   LOS: 3 days   Patient Care Team:  India Stubbs MD as PCP - General (Family Medicine)  Soraida Jay MD as Consulting Physician (Obstetrics and Gynecology)     Chief Complaint: Left middle cerebral artery stroke in the setting off left ICA partially thrombosed aneurysm.     Interval History: nervous for the surgery and angiogram.  No complaints today.     REVIEW OF SYSTEMS:   CARDIOVASCULAR: No chest pain, chest pressure or chest discomfort. No palpitations or edema.   RESPIRATORY: No shortness of breath, cough or sputum.   GASTROINTESTINAL: No anorexia, nausea, vomiting or diarrhea. No abdominal pain or blood.   HEMATOLOGIC: No bleeding or bruising.          Ventilator/Non-Invasive Ventilation Settings      None                Vital Signs  Temp:  [98 °F (36.7 °C)-98.9 °F (37.2 °C)] 98 °F (36.7 °C)  Heart Rate:  [65-98] 76  Resp:  [14-18] 18  BP: ()/(53-76) 100/60  No intake or output data in the 24 hours ending 11/01/17 1659  Flowsheet Rows           First Filed Value     Admission Height   66\" (167.6 cm) Documented at 10/29/2017 1502     Admission Weight   215 lb 8 oz (97.8 kg) Documented at 10/29/2017 1502            Physical Exam:                     General Appearance:    Alert, cooperative, in no acute distress.  No facial asymmetry noted.  Speech appears to be normal.     Lungs:     Clear to auscultation,respirations regular, even and                  " unlabored    Heart:    Regular rhythm and normal rate, normal S1 and S2, no            murmur, no gallop, no rub, no click   Chest Wall:    No abnormalities observed   Abdomen:     Normal bowel sounds, no masses, no organomegaly, soft        non-tender, non-distended, no guarding, no rebound                tenderness   Extremities:   Moves all extremities well, no edema, no cyanosis, no             redness      Results Review:             Results from last 7 days  Lab Units 11/01/17  0600 10/30/17  0420 10/29/17  1228   SODIUM mmol/L 142 139 138   POTASSIUM mmol/L 3.7 4.3 3.5   CHLORIDE mmol/L 109* 107 103   CO2 mmol/L 18.4* 18.4* 21.0*   BUN mg/dL 9 8 9   CREATININE mg/dL 0.65 0.66 0.69   GLUCOSE mg/dL 95 85 113*   CALCIUM mg/dL 8.5* 8.5* 9.3         Results from last 7 days  Lab Units 10/29/17  1228   TROPONIN T ng/mL <0.010         Results from last 7 days  Lab Units 11/01/17  0600 10/31/17  1009 10/30/17  0420 10/29/17  1228   WBC 10*3/mm3 6.06  --  6.16 7.11   HEMOGLOBIN g/dL 7.5* 8.5* 7.4* 8.9*   HEMATOCRIT % 27.2* 30.1* 26.6* 30.9*   PLATELETS 10*3/mm3 320  --  372 395         Results from last 7 days  Lab Units 10/29/17  1228   INR   1.06   APTT seconds 23.4         Results from last 7 days  Lab Units 10/29/17  1228   CHOLESTEROL mg/dL 179             Results from last 7 days  Lab Units 10/29/17  1228   CHOLESTEROL mg/dL 179   TRIGLYCERIDES mg/dL 64   HDL CHOL mg/dL 94*             I reviewed the patient's new clinical results.  I personally viewed and interpreted the patient's CXR      Medication Review:      aspirin 81 mg Oral Daily   atorvastatin 40 mg Oral Nightly   clopidogrel 75 mg Oral Daily   docusate sodium 100 mg Oral BID   topiramate 25 mg Oral Nightly   vitamin B-12 1,000 mcg Oral Daily            niCARdipine 5-15 mg/hr         ASSESSMENT:   Left MCA CVA in the setting off left ICA partially thrombosed aneurysm  History of migraine  History of B12 deficiency  pfo   Anemia likely due to  menorrhagia     PLAN:  Stroke symptoms are pretty much resolved with current NIH 0  Continue blood pressure management keep systolic less than 140  Aspirin Plavix started per neurosurgery  Plans per neurosurgery for cerebral angiogram and placement of pipeline stent.  Discussed with patient in detail  We will check iron studies for anemia     Ethan Jones MD  11/01/17  4:59 PM                              Progress Notes  Date of Service: 11/2/2017 10:27 AM  Marah Camacho RN   Case Management      []Hide copied text  []Hover for attribution information  Continued Stay Note  [facility]     Patient Name: Mariaelena Mack                                  MRN: [id number]  Today's Date: 11/2/2017                                         Admit Date: 10/29/2017            Discharge Plan        11/02/17 1027          Case Management/Social Work Plan     Additional Comments Patient going for stent placment today.  Will millie to follow for discharge needs.                     Discharge Codes      None             Marah Camacho RN                                                                         Op Note  Date of Service: 11/2/2017 3:04 PM  Roger Day MD   Neurosurgery      []Hide copied text  []Yasmin for attribution information  EMBOLIZATION CEREBRAL  Procedure Note  Cerebral Angiogram and Placement of Pipeline Embolization Device for treatment of Cerebral aneurysm     Mariaelena Mack  10/29/2017 - 11/2/2017  [id number]     SURGEON  Roger Day MD     ASSISTANT:  Marni Davis CFA  INDICATION:  Wide necked cerebral aneurysm.  She was given Plavix and aspirin and also Brilinta in the preoperative area.  The risks, benefits, and alternatives of embolization of cerebral aneurysm again explained in the preoperative area to the patient.  He voiced understanding and requests that we proceed.     Pre-op Diagnosis:   Intracranial aneurysm [I67.1]      Post-Op Diagnosis Codes:     * Intracranial  aneurysm [I67.1]     PROCEDURE PERFORMED:  Procedure(s):  Cerebral angiogram and embolization of cerebral angiogram with Pipeline Embolization Device and coils.     1. Selective catheterization and left internal carotid angiography  2. Superselective catheterization of the left middle cerebral artery (MCA) with Phenom microcatheter  3. Superselective catheterization of the left periophthalmic artery aneurysm and intraoperative angiography  4. Embolization of the cerebral aneurysm with a  9mm X 30cm Helix ev3 Axium coil  5. Embolization of cerebral aneurysm with a 3.25mm X 14 mm and a 3.5 mm X 14mm Pipeline Embolization Devices (PED)  6. 3d Reconstructed imagery on a separate work station X 2     6. Post embolization cerebral angiography     Anesthesia: General     Staff:   Circulator: Bonita Cameron RN; Shannon Spurling, RN  Scrub Person: Marni Carpenter  Vascular Radiology Technician: Tigist Barber; Xu He     Estimated Blood Loss: minimal     Specimens:    Order Name Source Comment Collection Info Order Time   HCG, SERUM, QUALITATIVE     Collected By: Jeannie Nagel 11/2/2017 11:38 AM   TYPE AND SCREEN Arm, Right   Collected By: Jeanine Nagel 11/2/2017 11:38 AM          Drains:   Urethral Catheter 11/02/17 1324 100% silicone 16 10 10 (Active)              Findings: 15mm large periophthalmic artery region cerebral aneurysm.     Complications: none     PROCEDURE IN DETAIL: The patient was brought to the operating room where she was hooked up to EKG, oxygen saturation, and blood pressure monitoring and placed on the biplane angiography table. Continuous monitoring was accomplished during the operative intervention.General endotracheal anesthesia was induced.      Local anesthesia was given.  Singlewall puncture technique 6 Kyrgyz introducing sheath. 5 Kyrgyz Art catheter.     Under fluoroscopic guidance selective catheterization of the left internal carotid artery.  left internal carotid artery  cerebral angiography in multiple projections including rotational angiography reconstructed in a three-dimensional manner on a separate workstation demonstrates the normal branching pattern of the internal carotid artery. There is no evidence of  arterio-venous malformation, arterio-venous fistula or venous anomaly. There is a 15mm large periophthalmic artery region cerebral aneurysm.     The patient was heparinized to 2X baseline ACT.     The Art catheter was exchanged over an Amplatz super stiff exchange length wire for a 90cm 7F sheath and placed into the common cartoid. A Phenom catheter was prepared with a Synchro wire and placed coaxially into a Navien Distal Access Catheter (DAC). Continuous heparinized saline flush was run through the Y-connectors. The Phenom/DAC combination were placed into the 6 F long sheath coaxially with another continuous heparinized saline flush.     Under live flouroscopy, the Marksman was placed into the left MCA and the DAC was advanced over the Marksman into the petrous carotid.      Confirmation of MCA location of the microcatheter was accomplished with a short flouroscopic angiogram.     I then chose an echelon microcatheter and another Synchro wire and I placed this within the 7 Georgian sheath outside of the Navien and distal access catheter.  I then super selectively catheterize the left internal carotid artery large periophthalmic artery region aneurysm.  A short fluoroscopic angiogram was accomplished demonstrating that the microcatheter was within the aneurysm.     I chose a 9 mm x 30 cm coil and I placed a couple of loops into the aneurysm but I did not try to deploy the entirety of the coil.     Based on the initial angiogram and measurements of the size of the internal carotid artery, I chose a 3.25 mm X 14 mm PED. The PED sheath was allowed to flush with heparinized saline in the hub of the rotating hemostatic valve attached to the Marksman delivery microcatheter and  than was advanced into the catheter.     Live overlay flouroscopy was used and the PED was advanced to the tip of the Phenom catheter in the  MCA. The device was pushed out about 5-6mm and allowed to begin to expand. Once the device was partially open, the distal portion of the device was pulled back to the target point distal to the neck of the aneurysm. The device was than advanced out of the microcatheter until the proximal portion of the PED was expanded. I waited until ful expansion of the PED was confirmed. I than advanced the Marksman catheter over the delivery wire though the PED into the MCA.     Arteriography through the existing DAC showed the PED to be fully deployed across the neck of the aneurysm and to apposed to the internal carotid walls very well.  There was however a question as to whether or not the more proximal portion of the PED was a little bit into the neck of the aneurysm and therefore I chose a second Pipeline Embolization Device this one was 14 mm in length by 3.5 mm in width.     Live overlay flouroscopy was used and the PED was advanced to the tip of the Phenom catheter in the  Distal ICA. The device was pushed out about 5-6mm and allowed to begin to expand. Once the device was partially open, the distal portion of the device was pulled back to the target point just distal to the neck of the aneurysm. The device was than advanced out of the microcatheter until the proximal portion of the PED was expanded. I brought the device around the curvature of the siphon.  I waited until ful expansion of the PED was confirmed. I than advanced the Marksman catheter over the delivery wire though the PED into the distal internal carotid artery.     I then went ahead and deployed the large coil into the cerebral aneurysm and detach the coil.     Postembolization arteriography in multiple projections including rotational angiography reconstructed in a three-dimensional manner demonstrated no flow  restriction within the great vessels intracranially.  There is slow flow into the aneurysm as expected.  There is double density of the pipeline embolization device That just at the neck of the aneurysm.     Arteriography through the existing sheath demonstrates the sheath to be within the common femoral artery. Minyx system was used to close the artery, pressure was held for the appropriate length of time and the patient was transferred to the recovery room in stable and good condition.        Roger Day MD      Date: 2017  Time: 3:04 PM                                    Physician Progress Notes (last 72 hours) (Notes from 11/3/2017  8:27 AM through 2017  8:27 AM)      Sandy Alicea MD at 11/3/2017  8:58 AM  Version 1 of 1         Full consult note to follow, but she has iron deficiency anemia due to menorrhagia which is going to be exacerbated by her need for anticoagulation. She's met with gynecology. She's previously been on iron without success and it causes gi discomfort.     Will give one dose of Venofer here and then arrange for two doses of Feraheme in the office. Explained risk of reaction.     OK for discharge from hematology standpoint as long as repeat hemoglobin greater than 7.5.      Electronically signed by Sandy Alicea MD at 11/3/2017  9:01 AM             Discharge Summaries  Date of Service: 11/3/2017 12:21 PM  Ethan Jones MD   Pulmonology      []Hide copied text                                                                     PHYSICIAN DISCHARGE SUMMARY                                                                        [facility]     Patient Identification:  Name: Mariaelena Mack  Age: 42 y.o.  Sex: female  :  1975  MRN: [id number]  Primary Care Physician: India Stubbs MD     Admit date: 10/29/2017  Discharge date and time: No discharge date for patient encounter.   Discharged Condition: stable     Discharge Diagnoses:Principal  Problem:    Left middle cerebral artery stroke  Active Problems:    Intracranial aneurysm    Iron deficiency anemia        Hospital Course: Mariaelena Mack presented to [facility]    Very pleasant 42-year-old female ex-smoker admitted through the emergency room with sudden onset of right-sided facial weakness.  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  Patient states earlier she had headache and some nausea which has since resolved.  Currently other than facial weakness he has no other neurological deficit.  Denies any chest pain or shortness of breath.   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  She should follow-up with us in approximately 2 weeks.  We will arrange appropriate imaging follow-up.  She will need to remain on Brilinta and full dose aspirin-325 mg daily.  She apparently is not a Plavix responder.  She will continue on Brilinta and aspirin for as long as the aneurysm remains patent.  We will repeat a cerebral arteriogram at 6 months from her date of surgery.     She does have a significant iron deficiency anemia.  This requires workup and will need to be monitored as well.  She may require a endometrial ablation for her menorrhagia.  I would prefer to wait approximately 6-8 weeks following her stroke prior to her undergoing any further relatively elective procedures.  She is to follow-up with Dr. Jay, OB/GYN as an outpatient.      She also has a moderate size patent foramen ovale.  Certainly the patent foramen ovale could be the source of her stroke.  She's been evaluated by cardiology.  Follow-up with cardiology should be in 4-6 weeks.  We will repeat hemoglobin and if it's stable and greater than 7.5 to be  discharged home today.  Consults:   IP CONSULT TO NEUROLOGY  IP CONSULT TO NEUROLOGY  IP CONSULT TO NEUROSURGERY  IP CONSULT TO PULMONOLOGY  IP CONSULT TO NEURO CLINICAL SPECIALIST  IP CONSULT TO REHAB ADMISSION  IP CONSULT TO CASE MANAGEMENT   IP CONSULT TO CASE MANAGEMENT   IP CONSULT TO CARDIOLOGY  IP CONSULT TO OB GYN  IP CONSULT TO HEMATOLOGY AND ONCOLOGY     Significant Diagnostic Studies:   [unfilled]     Discharge Exam:  Alert and oriented x 4, in NAD  Supple neck, midline trach  RRR, no m/r/g, no edema  Clear bilaterally, no wheezing, nonlabored  No clubbing or cyanosis      Disposition:  Home     Patient Instructions:   [unfilled]      Follow-up Information      Follow up with William Patino MD. Schedule an appointment as soon as possible for a visit in 4 week(s).     Specialty:  Cardiology     Contact information:     [address] [place] [address] [address]  [place] [address]  [phone]           Follow up with Roger Day MD Follow up in 2 week(s).     Specialties:  Neurosurgery, Radiology     Why:  our office will arrange and call pt     Contact information:     [address] [facility] [address]  [place] [address]  [phone]          [unfilled]      Medication Reconciliation: Please see electronically completed Med Rec.     Total time spent discharging patient including evaluation, medication reconciliation, arranging follow up, and post hospitalization instructions and education total time exceeds 30 minutes.     Signed:  Ethan Jones MD  11/3/2017  12:22 PM

## 2017-11-10 ENCOUNTER — INFUSION (OUTPATIENT)
Dept: ONCOLOGY | Facility: HOSPITAL | Age: 42
End: 2017-11-10

## 2017-11-10 ENCOUNTER — OFFICE VISIT (OUTPATIENT)
Dept: ONCOLOGY | Facility: CLINIC | Age: 42
End: 2017-11-10

## 2017-11-10 ENCOUNTER — LAB (OUTPATIENT)
Dept: LAB | Facility: HOSPITAL | Age: 42
End: 2017-11-10

## 2017-11-10 VITALS
HEIGHT: 66 IN | RESPIRATION RATE: 12 BRPM | BODY MASS INDEX: 33.68 KG/M2 | HEART RATE: 78 BPM | OXYGEN SATURATION: 98 % | TEMPERATURE: 98.7 F | SYSTOLIC BLOOD PRESSURE: 114 MMHG | DIASTOLIC BLOOD PRESSURE: 76 MMHG | WEIGHT: 209.6 LBS

## 2017-11-10 VITALS — HEART RATE: 69 BPM | DIASTOLIC BLOOD PRESSURE: 70 MMHG | SYSTOLIC BLOOD PRESSURE: 101 MMHG

## 2017-11-10 DIAGNOSIS — D50.0 IRON DEFICIENCY ANEMIA DUE TO CHRONIC BLOOD LOSS: ICD-10-CM

## 2017-11-10 DIAGNOSIS — IMO0001 IRON AND ITS COMPOUNDS CAUSING ADVERSE EFFECT IN THERAPEUTIC USE, SUBSEQUENT ENCOUNTER: Primary | ICD-10-CM

## 2017-11-10 DIAGNOSIS — D50.0 IRON DEFICIENCY ANEMIA DUE TO CHRONIC BLOOD LOSS: Primary | ICD-10-CM

## 2017-11-10 LAB
BASOPHILS # BLD AUTO: 0.05 10*3/MM3 (ref 0–0.1)
BASOPHILS NFR BLD AUTO: 0.6 % (ref 0–1.1)
DEPRECATED RDW RBC AUTO: 55 FL (ref 37–49)
EOSINOPHIL # BLD AUTO: 0.16 10*3/MM3 (ref 0–0.36)
EOSINOPHIL NFR BLD AUTO: 1.9 % (ref 1–5)
ERYTHROCYTE [DISTWIDTH] IN BLOOD BY AUTOMATED COUNT: 21.9 % (ref 11.7–14.5)
HCT VFR BLD AUTO: 31.4 % (ref 34–45)
HGB BLD-MCNC: 9.2 G/DL (ref 11.5–14.9)
IMM GRANULOCYTES # BLD: 0.04 10*3/MM3 (ref 0–0.03)
IMM GRANULOCYTES NFR BLD: 0.5 % (ref 0–0.5)
LYMPHOCYTES # BLD AUTO: 2.3 10*3/MM3 (ref 1–3.5)
LYMPHOCYTES NFR BLD AUTO: 27.1 % (ref 20–49)
MCH RBC QN AUTO: 22.1 PG (ref 27–33)
MCHC RBC AUTO-ENTMCNC: 29.3 G/DL (ref 32–35)
MCV RBC AUTO: 75.5 FL (ref 83–97)
MONOCYTES # BLD AUTO: 0.82 10*3/MM3 (ref 0.25–0.8)
MONOCYTES NFR BLD AUTO: 9.7 % (ref 4–12)
NEUTROPHILS # BLD AUTO: 5.11 10*3/MM3 (ref 1.5–7)
NEUTROPHILS NFR BLD AUTO: 60.2 % (ref 39–75)
NRBC BLD MANUAL-RTO: 0 /100 WBC (ref 0–0)
PLATELET # BLD AUTO: 427 10*3/MM3 (ref 150–375)
PMV BLD AUTO: 11.1 FL (ref 8.9–12.1)
RBC # BLD AUTO: 4.16 10*6/MM3 (ref 3.9–5)
WBC NRBC COR # BLD: 8.48 10*3/MM3 (ref 4–10)

## 2017-11-10 PROCEDURE — 96375 TX/PRO/DX INJ NEW DRUG ADDON: CPT

## 2017-11-10 PROCEDURE — 25010000002 FERUMOXYTOL 510 MG/17ML SOLUTION 510 MG VIAL: Performed by: INTERNAL MEDICINE

## 2017-11-10 PROCEDURE — 96374 THER/PROPH/DIAG INJ IV PUSH: CPT

## 2017-11-10 PROCEDURE — 63710000001 DIPHENHYDRAMINE PER 50 MG: Performed by: INTERNAL MEDICINE

## 2017-11-10 PROCEDURE — 99213 OFFICE O/P EST LOW 20 MIN: CPT | Performed by: NURSE PRACTITIONER

## 2017-11-10 PROCEDURE — 85025 COMPLETE CBC W/AUTO DIFF WBC: CPT | Performed by: INTERNAL MEDICINE

## 2017-11-10 PROCEDURE — 36416 COLLJ CAPILLARY BLOOD SPEC: CPT | Performed by: INTERNAL MEDICINE

## 2017-11-10 RX ORDER — SODIUM CHLORIDE 9 MG/ML
250 INJECTION, SOLUTION INTRAVENOUS ONCE
Status: COMPLETED | OUTPATIENT
Start: 2017-11-10 | End: 2017-11-10

## 2017-11-10 RX ORDER — SODIUM CHLORIDE 9 MG/ML
250 INJECTION, SOLUTION INTRAVENOUS ONCE
Status: CANCELLED | OUTPATIENT
Start: 2017-11-10

## 2017-11-10 RX ORDER — DIPHENHYDRAMINE HCL 25 MG
25 CAPSULE ORAL ONCE
Status: CANCELLED | OUTPATIENT
Start: 2017-11-10

## 2017-11-10 RX ORDER — DIPHENHYDRAMINE HCL 25 MG
25 CAPSULE ORAL ONCE
Status: COMPLETED | OUTPATIENT
Start: 2017-11-10 | End: 2017-11-10

## 2017-11-10 RX ADMIN — FAMOTIDINE 20 MG: 10 INJECTION INTRAVENOUS at 12:21

## 2017-11-10 RX ADMIN — FERUMOXYTOL 510 MG: 510 INJECTION INTRAVENOUS at 12:51

## 2017-11-10 RX ADMIN — SODIUM CHLORIDE 250 ML: 9 INJECTION, SOLUTION INTRAVENOUS at 12:22

## 2017-11-10 RX ADMIN — DIPHENHYDRAMINE HYDROCHLORIDE 25 MG: 25 CAPSULE ORAL at 12:21

## 2017-11-10 NOTE — PROGRESS NOTES
REASON FOR CONSULTATION:  Severe iron deficiency anemia    HISTORY OF PRESENT ILLNESS:  Mariaelena Mack is a 42 y.o. female  here today for scheduled Feraheme.  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  She received 1 dose of Venofer and 1 unit of PRBC's  in the hospital and was scheduled for Feraheme in the office today.    He is reports feeling well and recovering nicely with almost no neurologic symptoms.  She reports increased energy.  Her hemoglobin has already improved to 9.2.  She is eating and drinking well.  She denies chest pain or heart palpitations.  She denies bleeding or swelling.           Past Medical    Medical History         Past Medical History:   Diagnosis Date   • Bulging of thoracic intervertebral disc       over 20 yrs ago   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         Social History    Social History    Social History            Social History   • Marital status: Unknown       Spouse name: N/A   • Number of children: N/A   • Years of education: N/A          Occupational History   • Not on file.              Social History Main Topics   • Smoking status: Current Every Day Smoker       Packs/day: 0.00       Types: Electronic Cigarette       Last attempt to quit: 2015   • Smokeless tobacco: Never Used         Comment: uses Vaporizor   • Alcohol use 12.0 oz/week        20 Cans of beer per week          Comment: weekends   • Drug use: No   • Sexual activity: Yes       Partners: Male       Birth control/ protection: None           Other Topics Concern   • Not on file          Social History Narrative     Patient lives with  and socially drinks fairly heavily most weekends. States 6-8 beers on weekend nights. Less on weekdays.         Family History        Family History   Problem Relation Age of Onset   •  Migraines Mother     • Heart failure Mother        Medications     Current Facility-Administered Medications:   •  acetaminophen (TYLENOL) tablet 650 mg, 650 mg, Oral, Q4H PRN **OR** acetaminophen (TYLENOL) suppository 650 mg, 650 mg, Rectal, Q4H PRN, Roger Day MD  •  acetaminophen (TYLENOL) tablet 650 mg, 650 mg, Oral, Once **AND** diphenhydrAMINE (BENADRYL) capsule 50 mg, 50 mg, Oral, Once **AND** iron sucrose (VENOFER) 300 mg in sodium chloride 0.9 % 250 mL IVPB, 300 mg, Intravenous, Once, Sandy Alicea MD  •  aspirin chewable tablet 81 mg, 81 mg, Oral, Daily, Roger Day MD, 81 mg at 11/02/17 0836  •  aspirin tablet 650 mg, 650 mg, Oral, Daily, Roger Day MD, 650 mg at 11/02/17 0940  •  atorvastatin (LIPITOR) tablet 40 mg, 40 mg, Oral, Nightly, Abena Beth MD, 40 mg at 11/02/17 2021  •  clonazePAM (KlonoPIN) tablet 0.5 mg, 0.5 mg, Oral, BID PRN, Abena Beth MD, 0.5 mg at 10/31/17 2041  •  clopidogrel (PLAVIX) tablet 75 mg, 75 mg, Oral, Daily, Roger Day MD, 75 mg at 11/02/17 0837  •  docusate sodium (COLACE) capsule 100 mg, 100 mg, Oral, BID, Roger Day MD, 100 mg at 11/02/17 2000  •  Influenza Vac Subunit Quad (FLUCELVAX) injection 0.5 mL, 0.5 mL, Intramuscular, During Hospitalization, Ethan Jones MD  •  lactated ringers infusion, 9 mL/hr, Intravenous, Continuous, Brennen Helms MD, Last Rate: 9 mL/hr at 11/02/17 1238  •  morphine injection 2 mg, 2 mg, Intravenous, Q4H PRN **AND** naloxone (NARCAN) injection 0.4 mg, 0.4 mg, Intravenous, Q5 Min PRN, Roger Day MD  •  niCARdipine (CARDENE) 25 mg/250 mL (0.1 mg/mL) 0.9% NS VTB infusion, 5-15 mg/hr, Intravenous, Titrated, Roger Day MD  •  ondansetron (ZOFRAN) injection 4 mg, 4 mg, Intravenous, Q6H PRN, Roger Day MD  •  pneumococcal polysaccharide 23-valent (PNEUMOVAX-23) vaccine 0.5 mL, 0.5 mL, Intramuscular, During Hospitalization, Ethan Jones MD  •  sodium chloride 0.9 % flush  1-10 mL, 1-10 mL, Intravenous, PRN, Abena Beth MD  •  sodium chloride 0.9 % infusion, 75 mL/hr, Intravenous, Continuous, Roger Day MD, Last Rate: 75 mL/hr at 11/02/17 0850, 75 mL/hr at 11/02/17 0850  •  sodium chloride 0.9 % with KCl 20 mEq/L infusion, 60 mL/hr, Intravenous, Continuous, Roger Day MD, Last Rate: 60 mL/hr at 11/03/17 0048, 60 mL/hr at 11/03/17 0048  •  ticagrelor (BRILINTA) tablet tablet 60 mg, 60 mg, Oral, BID, Roger Day MD, 60 mg at 11/02/17 1741  •  topiramate (TOPAMAX) tablet 25 mg, 25 mg, Oral, Nightly, Abena Beth MD, 25 mg at 11/02/17 2021  •  vitamin B-12 (CYANOCOBALAMIN) tablet 1,000 mcg, 1,000 mcg, Oral, Daily, Abena Beth MD, Stopped at 11/02/17 0858  Allergies  No Known Allergies  Review of Systems  Fourteen point review of systems performed.  Positive pertinents identified above in history of presenting illness; all remaining systems negative.         Lab on 11/10/2017   Component Date Value Ref Range Status   • WBC 11/10/2017 8.48  4.00 - 10.00 10*3/mm3 Final   • RBC 11/10/2017 4.16  3.90 - 5.00 10*6/mm3 Final   • Hemoglobin 11/10/2017 9.2* 11.5 - 14.9 g/dL Final   • Hematocrit 11/10/2017 31.4* 34.0 - 45.0 % Final   • MCV 11/10/2017 75.5* 83.0 - 97.0 fL Final   • MCH 11/10/2017 22.1* 27.0 - 33.0 pg Final   • MCHC 11/10/2017 29.3* 32.0 - 35.0 g/dL Final   • RDW 11/10/2017 21.9* 11.7 - 14.5 % Final   • RDW-SD 11/10/2017 55.0* 37.0 - 49.0 fl Final   • MPV 11/10/2017 11.1  8.9 - 12.1 fL Final   • Platelets 11/10/2017 427* 150 - 375 10*3/mm3 Final   • Neutrophil % 11/10/2017 60.2  39.0 - 75.0 % Final   • Lymphocyte % 11/10/2017 27.1  20.0 - 49.0 % Final   • Monocyte % 11/10/2017 9.7  4.0 - 12.0 % Final   • Eosinophil % 11/10/2017 1.9  1.0 - 5.0 % Final   • Basophil % 11/10/2017 0.6  0.0 - 1.1 % Final   • Immature Grans % 11/10/2017 0.5  0.0 - 0.5 % Final   • Neutrophils, Absolute 11/10/2017 5.11  1.50 - 7.00 10*3/mm3 Final   • Lymphocytes,  Absolute 11/10/2017 2.30  1.00 - 3.50 10*3/mm3 Final   • Monocytes, Absolute 11/10/2017 0.82* 0.25 - 0.80 10*3/mm3 Final   • Eosinophils, Absolute 11/10/2017 0.16  0.00 - 0.36 10*3/mm3 Final   • Basophils, Absolute 11/10/2017 0.05  0.00 - 0.10 10*3/mm3 Final   • Immature Grans, Absolute 11/10/2017 0.04* 0.00 - 0.03 10*3/mm3 Final   • nRBC 11/10/2017 0.0  0.0 - 0.0 /100 WBC Final     GENERAL:  Well-developed, well-nourished female in no acute distress.   SKIN:  Warm, dry without rashes, purpura or petechiae.  HEAD:  Normocephalic.  EYES:  Pupils equal, round and reactive to light.  EOMs intact.  Conjunctivae normal.  EARS:  Hearing intact.  NOSE:  Septum midline.  No excoriations or nasal discharge.  MOUTH:  Tongue is well-papillated; no stomatitis or ulcers.  Lips normal. Right facial weakness  THROAT:  Oropharynx without lesions or exudates.  NECK:  Supple with good range of motion; no thyromegaly or masses  LYMPHATICS:  No cervical, supraclavicular, axillary adenopathy.  CHEST:  Lungs clear to percussion and auscultation. Good airflow.  CARDIAC:  Regular rate and rhythm without murmurs, rubs or gallops. Normal S1,S2.  ABDOMEN:  Soft, nontender, normal bowel sounds  EXTREMITIES:  No clubbing, cyanosis or edema.  NEUROLOGICAL:  Right facial weakness but no other significant deficits.   PSYCHIATRIC:  Normal affect and mood.          DIAGNOSTIC DATA:    Results from last 7 days  Lab Units 11/01/17  0600 10/31/17  1009 10/30/17  0420 10/29/17  1228   WBC 10*3/mm3 6.06  --  6.16 7.11   HEMOGLOBIN g/dL 7.5* 8.5* 7.4* 8.9*   HEMATOCRIT % 27.2* 30.1* 26.6* 30.9*   PLATELETS 10*3/mm3 320  --  372 395        Results from last 7 days  Lab Units 11/01/17  0600 10/30/17  0420 10/29/17  1228   SODIUM mmol/L 142 139 138   POTASSIUM mmol/L 3.7 4.3 3.5   CHLORIDE mmol/L 109* 107 103   CO2 mmol/L 18.4* 18.4* 21.0*   BUN mg/dL 9 8 9   CREATININE mg/dL 0.65 0.66 0.69   CALCIUM mg/dL 8.5* 8.5* 9.3   BILIRUBIN mg/dL 0.3  --  0.5   ALK  PHOS U/L 32*  --  40   ALT (SGPT) U/L 7  --  10   AST (SGOT) U/L 13  --  17   GLUCOSE mg/dL 95 85 113*         Assessment/Plan      Assessment/Plan:   This is a 42 y.o. female with:     1. Iron deficiency anemia secondary to menorrhagia, unresponsive to oral iron                        * We'll gave 1 dose of Venofer 300 mg while inpatient and is here today for scheduled Fereheme.                             2. Menorrhagia                        * Following with gynecology.      3. 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  She was then loaded with Brilenta and then went for pipeline embolization on 11/2/2017                        * She was discharged on Brilenta and full dose aspirin     Plan:  1. Proceed with Fereheme as scheduled today. SHe will return as already scheduled on 11/17/2017 to see Dr Alicea for formal hospital follow up and her second Fereheme treatment.    2. She will call the office with new or worsening symptoms.

## 2017-11-13 ENCOUNTER — OFFICE VISIT (OUTPATIENT)
Dept: NEUROSURGERY | Facility: CLINIC | Age: 42
End: 2017-11-13

## 2017-11-13 VITALS
DIASTOLIC BLOOD PRESSURE: 68 MMHG | HEART RATE: 76 BPM | SYSTOLIC BLOOD PRESSURE: 104 MMHG | BODY MASS INDEX: 34.99 KG/M2 | WEIGHT: 210 LBS | HEIGHT: 65 IN

## 2017-11-13 DIAGNOSIS — Z86.79 HX OF CEREBRAL ANEURYSM REPAIR: ICD-10-CM

## 2017-11-13 DIAGNOSIS — Z98.890 HX OF CEREBRAL ANEURYSM REPAIR: ICD-10-CM

## 2017-11-13 DIAGNOSIS — I67.1 INTRACRANIAL ANEURYSM: Primary | ICD-10-CM

## 2017-11-13 PROCEDURE — 99214 OFFICE O/P EST MOD 30 MIN: CPT | Performed by: NURSE PRACTITIONER

## 2017-11-13 NOTE — PROGRESS NOTES
"Subjective   Patient ID: Mariaelena Mack is a 42 y.o. female who is here today for follow-up for a cerebral aneurysm. She is status post an agio/embolization on 11/2/17. She presents accompanied by her sister.    History of Present Illness    She returns to the office today for ongoing follow-up status post cerebral angiogram and placement of the pipeline embolization device for treatment of a very large 15 mm periphthalmic artery region cerebral aneurysm with Dr. Day on November 2, 2017. She was not a Plavix responder so was started on Brilinta and full dose aspirin.     We initially saw the patient when she was admitted through the ER with sudden onset of right-sided facial weakness.  Workup in the emergency room revealed a left ICA partially thrombosed aneurysm with nonocclusive proximal MCA thrombus.  During the hospitalization and she was also found to have a large PFO and this was worked up by cardiology.  She was also started on Lipitor secondary to the stroke and she continues on this medication.    Today, she denies any new issues since being discharged from the hospital almost 2 weeks ago.  She specifically has had complete resolution of right sided facial weakness.  She denies any sensory or taste changes.  She also denies any new strokelike symptoms.  Overall she is feeling and doing well.  She states that she takes all of her medications \"religiously.\"  She also denies headache, vision changes and any other neuro deficits.  She also denies any issues with the right groin site.    She presents with her sister.        /68 (BP Location: Right arm, Patient Position: Sitting, Cuff Size: Large Adult)  Pulse 76  Ht 65\" (165.1 cm)  Wt 210 lb (95.3 kg)  LMP 10/30/2017 Comment: Heavy, lasting 7 or more days  BMI 34.95 kg/m2      The following portions of the patient's history were reviewed and updated as appropriate: allergies, current medications, past family history, past medical history, past " social history, past surgical history and problem list.    Review of Systems   Eyes: Negative for visual disturbance.   Cardiovascular: Negative for leg swelling.   Skin: Negative for color change (or coolness of procedure leg), pallor and wound (Denies swelling, excess bruising or bleeding of groin site  ).   Neurological: Negative for facial asymmetry, speech difficulty, weakness, numbness (of procedure leg) and headaches.       Objective   Physical Exam   Constitutional: She is oriented to person, place, and time. Vital signs are normal. She appears well-developed and well-nourished. She is cooperative.  Non-toxic appearance. She does not have a sickly appearance. She does not appear ill.   Very pleasant, well-appearing, middle-aged female   HENT:   Head: Normocephalic and atraumatic.   Eyes: EOM are normal.   Neck: Normal range of motion. Neck supple.   Cardiovascular: Normal rate and intact distal pulses.    Pulmonary/Chest: Effort normal and breath sounds normal.   Abdominal: Soft.   Musculoskeletal: Normal range of motion.   Neurological: She is alert and oriented to person, place, and time. She has normal strength. She displays normal reflexes. No cranial nerve deficit or sensory deficit. Coordination and gait normal. GCS eye subscore is 4. GCS verbal subscore is 5. GCS motor subscore is 6.   Able to heel and toe walk, able to tandem  Normal motor and sensory examination throughout  Cranial nerves II through XII grossly intact   Skin: Skin is warm.   Psychiatric: She has a normal mood and affect. Her behavior is normal. Judgment and thought content normal.   Vitals reviewed.    Neurologic Exam     Mental Status   Oriented to person, place, and time.     Cranial Nerves     CN III, IV, VI   Extraocular motions are normal.     Motor Exam     Strength   Strength 5/5 throughout.       Assessment/Plan   Independent Review of Radiographic Studies:    No new imaging    OR and Hospital records reviewed      Medical  Decision Making:    She presents to the office for first hospital follow-up status post acute left MCA stroke and resulting right-sided facial weakness and numbness.  Exam as noted above, no red flags.  During a stroke workup, she was discovered to have a large 15 mm left ICA aneurysm.  She ultimately underwent cerebral angiogram and pipeline stent placement with Dr. Day.  She states that she takes the Brilinta twice daily in addition to the full dose aspirin.  She is tolerating the medications well.  She denies any new strokelike symptoms and there are no neuro deficits on exam.  She appears to be doing very well.  She has an additional outpatient follow-up with cardiology in approximately one month with further evaluation of the PFO.  From our standpoint she is stable and we'll plan on having her return to our office in 3 months for preoperative evaluation before undergoing repeat cerebral angiogram.  She will be watched very closely from our office with regard to aneurysm surveillance.  She is to call at anytime with any questions or concerns.  If she has any strokelike symptoms she is to go to the nearest emergency room or call 911.  She expressed understanding.    Plan: Return to office in 3 months for repeat cerebral angiogram    Mariaelena was seen today for cerebral aneurysm.    Diagnoses and all orders for this visit:    Intracranial aneurysm    Hx of cerebral aneurysm repair      Return in about 3 months (around 2/13/2018) for Follow up as scheduled.

## 2017-11-15 DIAGNOSIS — D50.0 IRON DEFICIENCY ANEMIA DUE TO CHRONIC BLOOD LOSS: Primary | ICD-10-CM

## 2017-11-17 ENCOUNTER — OFFICE VISIT (OUTPATIENT)
Dept: ONCOLOGY | Facility: CLINIC | Age: 42
End: 2017-11-17

## 2017-11-17 ENCOUNTER — TELEPHONE (OUTPATIENT)
Dept: NEUROSURGERY | Facility: CLINIC | Age: 42
End: 2017-11-17

## 2017-11-17 ENCOUNTER — INFUSION (OUTPATIENT)
Dept: ONCOLOGY | Facility: HOSPITAL | Age: 42
End: 2017-11-17

## 2017-11-17 ENCOUNTER — LAB (OUTPATIENT)
Dept: LAB | Facility: HOSPITAL | Age: 42
End: 2017-11-17

## 2017-11-17 VITALS
BODY MASS INDEX: 33.43 KG/M2 | HEART RATE: 90 BPM | SYSTOLIC BLOOD PRESSURE: 102 MMHG | TEMPERATURE: 99 F | HEIGHT: 67 IN | OXYGEN SATURATION: 100 % | WEIGHT: 213 LBS | DIASTOLIC BLOOD PRESSURE: 60 MMHG | RESPIRATION RATE: 12 BRPM

## 2017-11-17 VITALS — SYSTOLIC BLOOD PRESSURE: 102 MMHG | DIASTOLIC BLOOD PRESSURE: 70 MMHG

## 2017-11-17 DIAGNOSIS — D50.0 IRON DEFICIENCY ANEMIA DUE TO CHRONIC BLOOD LOSS: ICD-10-CM

## 2017-11-17 DIAGNOSIS — IMO0001 IRON AND ITS COMPOUNDS CAUSING ADVERSE EFFECT IN THERAPEUTIC USE, SUBSEQUENT ENCOUNTER: Primary | ICD-10-CM

## 2017-11-17 DIAGNOSIS — D50.0 IRON DEFICIENCY ANEMIA DUE TO CHRONIC BLOOD LOSS: Primary | ICD-10-CM

## 2017-11-17 LAB
BASOPHILS # BLD AUTO: 0.04 10*3/MM3 (ref 0–0.1)
BASOPHILS NFR BLD AUTO: 0.5 % (ref 0–1.1)
DEPRECATED RDW RBC AUTO: 74.5 FL (ref 37–49)
EOSINOPHIL # BLD AUTO: 0.06 10*3/MM3 (ref 0–0.36)
EOSINOPHIL NFR BLD AUTO: 0.7 % (ref 1–5)
ERYTHROCYTE [DISTWIDTH] IN BLOOD BY AUTOMATED COUNT: 26.9 % (ref 11.7–14.5)
HCT VFR BLD AUTO: 35.1 % (ref 34–45)
HGB BLD-MCNC: 10.6 G/DL (ref 11.5–14.9)
IMM GRANULOCYTES # BLD: 0.04 10*3/MM3 (ref 0–0.03)
IMM GRANULOCYTES NFR BLD: 0.5 % (ref 0–0.5)
LYMPHOCYTES # BLD AUTO: 1.84 10*3/MM3 (ref 1–3.5)
LYMPHOCYTES NFR BLD AUTO: 21.1 % (ref 20–49)
MCH RBC QN AUTO: 23.9 PG (ref 27–33)
MCHC RBC AUTO-ENTMCNC: 30.2 G/DL (ref 32–35)
MCV RBC AUTO: 79.1 FL (ref 83–97)
MONOCYTES # BLD AUTO: 0.55 10*3/MM3 (ref 0.25–0.8)
MONOCYTES NFR BLD AUTO: 6.3 % (ref 4–12)
NEUTROPHILS # BLD AUTO: 6.19 10*3/MM3 (ref 1.5–7)
NEUTROPHILS NFR BLD AUTO: 70.9 % (ref 39–75)
NRBC BLD MANUAL-RTO: 0 /100 WBC (ref 0–0)
PLATELET # BLD AUTO: 336 10*3/MM3 (ref 150–375)
PMV BLD AUTO: 10.4 FL (ref 8.9–12.1)
RBC # BLD AUTO: 4.44 10*6/MM3 (ref 3.9–5)
WBC NRBC COR # BLD: 8.72 10*3/MM3 (ref 4–10)

## 2017-11-17 PROCEDURE — 63710000001 DIPHENHYDRAMINE PER 50 MG: Performed by: INTERNAL MEDICINE

## 2017-11-17 PROCEDURE — 99214 OFFICE O/P EST MOD 30 MIN: CPT | Performed by: INTERNAL MEDICINE

## 2017-11-17 PROCEDURE — 36416 COLLJ CAPILLARY BLOOD SPEC: CPT | Performed by: INTERNAL MEDICINE

## 2017-11-17 PROCEDURE — 96375 TX/PRO/DX INJ NEW DRUG ADDON: CPT

## 2017-11-17 PROCEDURE — 25010000002 FERUMOXYTOL 510 MG/17ML SOLUTION 510 MG VIAL: Performed by: INTERNAL MEDICINE

## 2017-11-17 PROCEDURE — 85025 COMPLETE CBC W/AUTO DIFF WBC: CPT | Performed by: INTERNAL MEDICINE

## 2017-11-17 PROCEDURE — 96374 THER/PROPH/DIAG INJ IV PUSH: CPT

## 2017-11-17 RX ORDER — DIPHENHYDRAMINE HCL 25 MG
25 CAPSULE ORAL ONCE
Status: CANCELLED | OUTPATIENT
Start: 2017-11-17

## 2017-11-17 RX ORDER — DIPHENHYDRAMINE HCL 25 MG
25 CAPSULE ORAL ONCE
Status: COMPLETED | OUTPATIENT
Start: 2017-11-17 | End: 2017-11-17

## 2017-11-17 RX ORDER — SODIUM CHLORIDE 9 MG/ML
250 INJECTION, SOLUTION INTRAVENOUS ONCE
Status: COMPLETED | OUTPATIENT
Start: 2017-11-17 | End: 2017-11-17

## 2017-11-17 RX ORDER — SODIUM CHLORIDE 9 MG/ML
250 INJECTION, SOLUTION INTRAVENOUS ONCE
Status: CANCELLED | OUTPATIENT
Start: 2017-11-17

## 2017-11-17 RX ADMIN — SODIUM CHLORIDE: 9 INJECTION, SOLUTION INTRAVENOUS at 08:48

## 2017-11-17 RX ADMIN — DIPHENHYDRAMINE HYDROCHLORIDE 25 MG: 25 CAPSULE ORAL at 08:48

## 2017-11-17 RX ADMIN — FAMOTIDINE 20 MG: 10 INJECTION INTRAVENOUS at 08:48

## 2017-11-17 RX ADMIN — FERUMOXYTOL 510 MG: 510 INJECTION INTRAVENOUS at 09:17

## 2017-11-17 NOTE — TELEPHONE ENCOUNTER
Patient saw LB 11/13 for aneurysm f/u post angio/embo 11/2.  I see nothing in the notes regarding discussion of work at all, but I do see in the RTW in the media section, that the RTW form from [employer] was signed off on for RTW with no restrictions as of 11/20.  Is working half days three days for the first week standard protocol, with RTW full duty the following Monday. Patient is an  for [employer]. Ok to send on our regular RTW slip.

## 2017-11-17 NOTE — TELEPHONE ENCOUNTER
Patient was seen on Monday 11/13 by Celina for aneurysm. Pt was given a note that says that she can return to work on Mon11/20. Pt says that she discussed with  Celina working half days on Mon ,Tues and Wed.  It is not written on the return to work paper.Pt needs a letter faxed to her employer stating that she is to work only half days.   Mariaelena  [phone]   Please fax [facility] bladimir at  [phone]

## 2017-11-17 NOTE — PROGRESS NOTES
History:     Reason for follow up:   Iron deficiency anemia secondary to menorrhagia  Hospital follow up     HPI:  Mariaelena Mack presents for follow-up of Her iron deficiency anemia secondary to menorrhagia.  I initially saw her while she was inpatient after she suffered a acute CVA.  She received 1 dose of the referring 1 unit of packed red blood cells while inpatient and then has received 1 dose of Feraheme since being discharged.  She did her gynecologist are discussing ablation verses hysterectomy however with her recent stroke that may have to be put on hold for a few months.  She is feeling much better now without any new concerns.  No bleeding besides the spring she denies any fevers, chills, night sweats or residual weakness from her stroke.    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00     Types: Electronic Cigarette     Quit date: 2015   • Smokeless tobacco: Never Used      Comment: uses Vaporizor   • Alcohol use 12.0 oz/week     20 Cans of beer per week      Comment: weekends   • Drug use: No   • Sexual activity: Yes     Partners: Male     Birth control/ protection: None     Other Topics Concern   • Not on file  "    Social History Narrative    Patient lives with  and socially drinks fairly heavily most weekends. States 6-8 beers on weekend nights. Less on weekdays.     Family History  Family History   Problem Relation Age of Onset   • Migraines Mother    • Heart failure Mother    • Stroke Mother    • Stroke Maternal Grandmother    • Diabetes Maternal Grandmother    • Heart disease Father    • Cancer Sister    • Stroke Sister    • Diabetes Brother      Allergies  No Known Allergies    Medications: The current medication list was reviewed in the EMR.    Review of Systems  GENERAL: No change in appetite or weight; No fevers, chills, sweats.    SKIN: No nonhealing lesions. No rashes.  HEME/LYMPH: No easy bruising, bleeding. No swollen nodes.   EYES: No vision changes or diplopia.   ENT: No tinnitus, hearing loss, gum bleeding, epistaxis, hoarseness or dysphagia.   RESPIRATORY: No cough, shortness of breath, hemoptysis or wheezing.   CVS: No chest pain, palpitations, orthopnea, dyspnea on exertion or PND.   GI: No melena or hematochezia. No abdominal pain.No nausea, vomiting, constipation, diarrhea  : No lower tract obstructive symptoms, dysuria or hematuria.   MUSCULOSKELETAL: No bone pain.No joint stiffness.   NEUROLOGICAL: No global weakness, loss of consciousness or seizures.   PSYCHIATRIC: No increased nervousness, mood changes or depression.     Objective    Objective:     Vitals:    11/17/17 0815   BP: 102/60   Pulse: 90   Resp: 12   Temp: 99 °F (37.2 °C)   TempSrc: Oral   SpO2: 100%   Weight: 213 lb (96.6 kg)   Height: 66.54\" (169 cm)   PainSc: 0-No pain     Current Status 11/10/2017   ECOG score 0     GENERAL:  Well-developed, well-nourished female in no acute distress.   SKIN:  Warm, dry without rashes, purpura or petechiae.  HEAD:  Normocephalic.  EYES:  Pupils equal, round and reactive to light.  EOMs intact.  Conjunctivae normal.  EARS:  Hearing intact.  NOSE:  Septum midline.  No excoriations or nasal " discharge.  LYMPHATICS:  No cervical, supraclavicular, axillary adenopathy.  CHEST:  Lungs clear to percussion and auscultation. Good airflow.  CARDIAC:  Regular rate and rhythm without murmurs, rubs or gallops. Normal S1,S2.  ABDOMEN:  Soft, nontender, normal bowel sounds  EXTREMITIES:  No clubbing, cyanosis or edema.  NEUROLOGICAL:  Cranial Nerves II-XII grossly intact.  No focal neurological deficits. No residual facial droop  PSYCHIATRIC:  Normal affect and mood.         Labs and Imaging  Results for orders placed or performed in visit on 11/17/17   CBC Auto Differential   Result Value Ref Range    WBC 8.72 4.00 - 10.00 10*3/mm3    RBC 4.44 3.90 - 5.00 10*6/mm3    Hemoglobin 10.6 (L) 11.5 - 14.9 g/dL    Hematocrit 35.1 34.0 - 45.0 %    MCV 79.1 (L) 83.0 - 97.0 fL    MCH 23.9 (L) 27.0 - 33.0 pg    MCHC 30.2 (L) 32.0 - 35.0 g/dL    RDW 26.9 (H) 11.7 - 14.5 %    RDW-SD 74.5 (H) 37.0 - 49.0 fl    MPV 10.4 8.9 - 12.1 fL    Platelets 336 150 - 375 10*3/mm3    Neutrophil % 70.9 39.0 - 75.0 %    Lymphocyte % 21.1 20.0 - 49.0 %    Monocyte % 6.3 4.0 - 12.0 %    Eosinophil % 0.7 (L) 1.0 - 5.0 %    Basophil % 0.5 0.0 - 1.1 %    Immature Grans % 0.5 0.0 - 0.5 %    Neutrophils, Absolute 6.19 1.50 - 7.00 10*3/mm3    Lymphocytes, Absolute 1.84 1.00 - 3.50 10*3/mm3    Monocytes, Absolute 0.55 0.25 - 0.80 10*3/mm3    Eosinophils, Absolute 0.06 0.00 - 0.36 10*3/mm3    Basophils, Absolute 0.04 0.00 - 0.10 10*3/mm3    Immature Grans, Absolute 0.04 (H) 0.00 - 0.03 10*3/mm3    nRBC 0.0 0.0 - 0.0 /100 WBC         Assessment/Plan   Assessment   This is a 42 y.o. female with:      1. Iron deficiency anemia secondary to menorrhagia, unresponsive to oral iron   * Status post 1 unit packed RBCs and 1 dose of Venofer while inpatient in early November 2017.   * Here to receive her second dose of Feraheme.    * Hemoglobin improved. Unclear how often she'll require Feraheme.       2. Menorrhagia   * Following with gynecology.       3. 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  She was then loaded with Brilenta and then went for pipeline embolization on 11/2/2017   * She was discharged on Brilenta and full dose aspirin      Plan:     1. Proceed with Fereheme as scheduled today.   2. Plan lab check in 3 months, MD in 6 months.     Follow-up in 3 months, 6 months. I asked the patient to call for any questions, concerns, or new symptoms.

## 2017-11-21 NOTE — TELEPHONE ENCOUNTER
ERIN is only a quideline for when to RTW. Per Celina, she is okay to RTW 1/2 days as noted. Patient informed and is fine with this.

## 2017-11-21 NOTE — TELEPHONE ENCOUNTER
Pt called in.  Her [facility] paperwork states she should have 12 weeks of recover, but on 11/17, PANTERA wriote a RTW note stating Pt can return 1/2 days starting this week.  Pt is confused as to when she is released to work?    Contact: [phone]

## 2017-12-18 ENCOUNTER — OFFICE VISIT (OUTPATIENT)
Dept: CARDIOLOGY | Facility: CLINIC | Age: 42
End: 2017-12-18

## 2017-12-18 VITALS
DIASTOLIC BLOOD PRESSURE: 66 MMHG | SYSTOLIC BLOOD PRESSURE: 88 MMHG | HEART RATE: 86 BPM | BODY MASS INDEX: 35.32 KG/M2 | RESPIRATION RATE: 18 BRPM | HEIGHT: 65 IN | WEIGHT: 212 LBS

## 2017-12-18 DIAGNOSIS — I63.032 CEREBROVASCULAR ACCIDENT (CVA) DUE TO THROMBOSIS OF LEFT CAROTID ARTERY (HCC): Primary | ICD-10-CM

## 2017-12-18 DIAGNOSIS — Q21.12 PFO (PATENT FORAMEN OVALE): ICD-10-CM

## 2017-12-18 DIAGNOSIS — I51.7 RIGHT VENTRICULAR ENLARGEMENT: ICD-10-CM

## 2017-12-18 PROCEDURE — 99213 OFFICE O/P EST LOW 20 MIN: CPT | Performed by: INTERNAL MEDICINE

## 2018-01-01 NOTE — PROGRESS NOTES
Date of Office Visit: 2017  Encounter Provider: William Patino MD  Place of Service: [facility] CARDIOLOGY  Patient Name: Mariaelena Mack  :1975    Chief complaint: Follow-up for left ICA aneurysm with partial thrombosis, left MCA   CVA, atrial shunt, RV enlargement.     History of Present Illness:    I again had the pleasure of seeing the patient in cardiology office on 2017.  She is a   very pleasant 42 year-old white female with a past medical history significant for a left   internal carotid artery aneurysm, left MCA CVA, and atrial shunt who presents for follow-up.    I initially saw the patient in the hospital on 10/30/2017.  She had been admitted with   headache, speech impairment, and right-sided facial weakness and numbness.  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  She   also had a left MCA distribution CVA with a nonocclusive proximal thrombus, from the left   internal carotid artery aneurysm.  She ultimately underwent a cerebral angiogram and   embolization of the aneurysm with a Pipeline embolization device and coils on 2017   by Dr. Day.  While hospitalized, she did have an echocardiogram performed on   10/29/2017 which showed an ejection fraction of 60-65%, normal diastolic function, mildly   enlarged right ventricle, and a moderately positive saline study suggestive of an atrial level   shunt.    The patient presents today for follow-up.  Overall, she is doing much better.  Most of her   neurological symptoms have completely resolved.  She will require a repeat cerebral   angiogram after 2018.  Her blood pressure slightly low today at 88/66, although   she does not have any symptoms with this.  She is taking aspirin and Brilinta.  She has   had no chest pain or shortness of breath.    Past Medical History:   Diagnosis Date   • Bulging of thoracic intervertebral disc      over 20 yrs ago   • Internal carotid aneurysm     Left ICA aneurysm with partial thrombosis 10/29/17 - resulting in left MCA CVA.   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 30 tablet 11   • atorvastatin (LIPITOR) 40 MG tablet Take 1 tablet by mouth Every Night. 30 tablet 1   • cetirizine (zyrTEC) 10 MG tablet Take 10 mg by mouth Daily As Needed for Allergies.     • Docusate Calcium (STOOL SOFTENER PO) Take  by mouth As Needed.     • Multiple Vitamin (MULTI-VITAMIN DAILY PO) Take  by mouth Daily.     • vitamin B-12 (CYANOCOBALAMIN) 1000 MCG tablet Take 1,000 mcg by mouth Daily.       No current facility-administered medications on file prior to visit.      Allergies as of 12/18/2017   • (No Known Allergies)     Social History     Social History   • Marital status:      Spouse name: Silvestre   • Number of children: N/A   • Years of education: N/A     Occupational History   •  [employer]     Social History Main Topics   • Smoking status: Former Smoker     Packs/day: 0.00     Types: Electronic Cigarette     Quit date: 2015   • Smokeless tobacco: Never Used      Comment: uses Vaporizor   • Alcohol use 12.0 oz/week     20 Cans of beer per week      Comment: weekends   • Drug use: No   • Sexual activity: Yes     Partners: Male     Birth control/ protection: None     Other Topics Concern   • Not on file     Social History Narrative    Patient lives with  " and socially drinks fairly heavily most weekends. States 6-8 beers on weekend nights. Less on weekdays.     Family History   Problem Relation Age of Onset   • Migraines Mother    • Heart failure Mother    • Stroke Mother    • Stroke Maternal Grandmother    • Diabetes Maternal Grandmother    • Heart disease Father    • Cancer Sister    • Stroke Sister    • Diabetes Brother        Review of Systems   Cardiovascular: Positive for leg swelling.   Neurological: Positive for dizziness and headaches.   All other systems reviewed and are negative.    Objective:     Vitals:    12/18/17 1318   BP: (!) 88/66   Pulse: 86   Resp: 18   Weight: 96.2 kg (212 lb)   Height: 165.1 cm (65\")     Body mass index is 35.28 kg/(m^2).    Physical Exam   Constitutional: She is oriented to person, place, and time. She appears well-developed and well-nourished.   HENT:   Head: Normocephalic and atraumatic.   Eyes: Conjunctivae are normal.   Neck: Neck supple.   Cardiovascular: Normal rate and regular rhythm.  Exam reveals no gallop and no friction rub.    No murmur heard.  Pulmonary/Chest: Effort normal and breath sounds normal.   Abdominal: Soft. There is no tenderness.   Musculoskeletal: She exhibits no edema.   Neurological: She is alert and oriented to person, place, and time.   Skin: Skin is warm.   Psychiatric: She has a normal mood and affect. Her behavior is normal.     Lab Review:   Procedures    Cardiac Procedures:  1.  Echocardiogram on 10/29/2017: Ejection fraction was 60-65%.  There was normal   diastolic function.  The right ventricle was mildly dilated.  The saline study was moderately   positive suggestive of an atrial level shunt.    Assessment:       Diagnosis Plan   1. Cerebrovascular accident (CVA) due to thrombosis of left carotid artery     2. PFO (patent foramen ovale)     3. Right ventricular enlargement       Plan:       Again, the patient seems to be doing much better.  The cause of her stroke was the partially "   thrombosed left internal carotid artery aneurysm.  She will need another angiogram after   February 2018, although the initial embolization of the aneurysm appeared to go well.  With   regards to her cardiac status, she did have a moderately positive saline study on her   echocardiogram from 10/29/2017.  This was suggestive of an atrial level shunt, and her   right ventricle was also mildly dilated.  At some point in the future, I do feel that a   transesophageal echocardiogram is indicated to further assess this.  Specifically, I would   like to assess for an ASD given the right ventricular enlargement.  However, for now, I would   prefer that she continue to heal from her recent neurological event and have her next   cerebral angiogram prior to doing this.  I am going to have her follow-up with me in 4 months.    If things are stable from a neurological standpoint, I will have the ADEEL scheduled at that point.    The aspirin and Brilinta duration will be per Dr. Day in Neurosurgery.

## 2018-01-03 ENCOUNTER — TELEPHONE (OUTPATIENT)
Dept: NEUROLOGY | Facility: CLINIC | Age: 43
End: 2018-01-03

## 2018-01-03 NOTE — TELEPHONE ENCOUNTER
I called and spoke with Ms. Mack.  She said she is doing really well. Still gets tired very earily.  She is able to work from home.  She was not in need of PT or OT after discharge from the hospital.  She has not had a follow up with her PCP and I did encourage her make a visit.  She has a follow up with Maria D LANDRY, Feb 16. 2018, Celina LANDRY 2-21 and the cardiologist in March.  Discharge meds are Lipitor 40 mg at night,  mg daily and Brilinta 60 mg twice a day.  We reviewed signs and symptoms of stroke and to call 911 immediately. mRS 1.  CODI Suarez RN

## 2018-01-31 ENCOUNTER — APPOINTMENT (OUTPATIENT)
Dept: WOMENS IMAGING | Facility: HOSPITAL | Age: 43
End: 2018-01-31

## 2018-01-31 PROCEDURE — 77067 SCR MAMMO BI INCL CAD: CPT | Performed by: RADIOLOGY

## 2018-02-09 ENCOUNTER — LAB (OUTPATIENT)
Dept: LAB | Facility: HOSPITAL | Age: 43
End: 2018-02-09

## 2018-02-09 ENCOUNTER — TELEPHONE (OUTPATIENT)
Dept: ONCOLOGY | Facility: HOSPITAL | Age: 43
End: 2018-02-09

## 2018-02-09 ENCOUNTER — CLINICAL SUPPORT (OUTPATIENT)
Dept: ONCOLOGY | Facility: HOSPITAL | Age: 43
End: 2018-02-09

## 2018-02-09 DIAGNOSIS — D50.0 IRON DEFICIENCY ANEMIA DUE TO CHRONIC BLOOD LOSS: ICD-10-CM

## 2018-02-09 LAB
BASOPHILS # BLD AUTO: 0.04 10*3/MM3 (ref 0–0.1)
BASOPHILS NFR BLD AUTO: 0.5 % (ref 0–1.1)
DEPRECATED RDW RBC AUTO: 46.1 FL (ref 37–49)
EOSINOPHIL # BLD AUTO: 0.08 10*3/MM3 (ref 0–0.36)
EOSINOPHIL NFR BLD AUTO: 1 % (ref 1–5)
ERYTHROCYTE [DISTWIDTH] IN BLOOD BY AUTOMATED COUNT: 13.3 % (ref 11.7–14.5)
FERRITIN SERPL-MCNC: 6.1 NG/ML (ref 11–207)
HCT VFR BLD AUTO: 30.4 % (ref 34–45)
HGB BLD-MCNC: 9.4 G/DL (ref 11.5–14.9)
IMM GRANULOCYTES # BLD: 0.02 10*3/MM3 (ref 0–0.03)
IMM GRANULOCYTES NFR BLD: 0.3 % (ref 0–0.5)
IRON 24H UR-MRATE: 27 MCG/DL (ref 37–145)
IRON SATN MFR SERPL: 6 % (ref 14–48)
LYMPHOCYTES # BLD AUTO: 1.66 10*3/MM3 (ref 1–3.5)
LYMPHOCYTES NFR BLD AUTO: 21.6 % (ref 20–49)
MCH RBC QN AUTO: 29.6 PG (ref 27–33)
MCHC RBC AUTO-ENTMCNC: 30.9 G/DL (ref 32–35)
MCV RBC AUTO: 95.6 FL (ref 83–97)
MONOCYTES # BLD AUTO: 0.46 10*3/MM3 (ref 0.25–0.8)
MONOCYTES NFR BLD AUTO: 6 % (ref 4–12)
NEUTROPHILS # BLD AUTO: 5.44 10*3/MM3 (ref 1.5–7)
NEUTROPHILS NFR BLD AUTO: 70.6 % (ref 39–75)
NRBC BLD MANUAL-RTO: 0 /100 WBC (ref 0–0)
PLATELET # BLD AUTO: 340 10*3/MM3 (ref 150–375)
PMV BLD AUTO: 10.6 FL (ref 8.9–12.1)
RBC # BLD AUTO: 3.18 10*6/MM3 (ref 3.9–5)
TIBC SERPL-MCNC: 423 MCG/DL (ref 249–505)
TRANSFERRIN SERPL-MCNC: 302 MG/DL (ref 200–360)
WBC NRBC COR # BLD: 7.7 10*3/MM3 (ref 4–10)

## 2018-02-09 PROCEDURE — 82728 ASSAY OF FERRITIN: CPT | Performed by: INTERNAL MEDICINE

## 2018-02-09 PROCEDURE — 85025 COMPLETE CBC W/AUTO DIFF WBC: CPT | Performed by: INTERNAL MEDICINE

## 2018-02-09 PROCEDURE — 36415 COLL VENOUS BLD VENIPUNCTURE: CPT | Performed by: INTERNAL MEDICINE

## 2018-02-09 PROCEDURE — 84466 ASSAY OF TRANSFERRIN: CPT | Performed by: INTERNAL MEDICINE

## 2018-02-09 PROCEDURE — 83540 ASSAY OF IRON: CPT | Performed by: INTERNAL MEDICINE

## 2018-02-09 RX ORDER — SODIUM CHLORIDE 9 MG/ML
250 INJECTION, SOLUTION INTRAVENOUS ONCE
Status: CANCELLED | OUTPATIENT
Start: 2018-02-12

## 2018-02-09 RX ORDER — DIPHENHYDRAMINE HCL 25 MG
25 CAPSULE ORAL ONCE
Status: CANCELLED | OUTPATIENT
Start: 2018-02-12

## 2018-02-09 NOTE — PROGRESS NOTES
Patient's iron remains low and thus she needs 2 additional doses of ferriheme.  I see the documentation about ablation scheduled for March.

## 2018-02-09 NOTE — TELEPHONE ENCOUNTER
Patient called to question if she really needed the iron or not since she is having surgery in march to get the issue addressed. Explained what her levels were and that she would continue to drop until surgery and it would be beneficial to get the iron, however it was her choice. She said to leave the iron scheduled and she would call back to maybe reschedule them to get it done on day of another MD visit so she only has to take minimal days off.

## 2018-02-09 NOTE — PROGRESS NOTES
Pt here today for CBC review and iron studies. HGB 9.4, dropped from 10.6 in November. Informed pt that she can call Monday for her iron results. Pt v/u. Pt states she is scheduled for ablation march 14th in hopes that this will resolve her low hgb issues. Pt is due back to see Dr. Alicea in may. Informed pt to call us with any questions or concerns in the meantime. Pt v/u

## 2018-02-13 ENCOUNTER — INFUSION (OUTPATIENT)
Dept: ONCOLOGY | Facility: HOSPITAL | Age: 43
End: 2018-02-13

## 2018-02-13 ENCOUNTER — APPOINTMENT (OUTPATIENT)
Dept: ONCOLOGY | Facility: HOSPITAL | Age: 43
End: 2018-02-13

## 2018-02-13 VITALS
BODY MASS INDEX: 37.84 KG/M2 | WEIGHT: 227.4 LBS | DIASTOLIC BLOOD PRESSURE: 64 MMHG | SYSTOLIC BLOOD PRESSURE: 107 MMHG | TEMPERATURE: 98.4 F | HEART RATE: 80 BPM

## 2018-02-13 DIAGNOSIS — D50.0 IRON DEFICIENCY ANEMIA DUE TO CHRONIC BLOOD LOSS: ICD-10-CM

## 2018-02-13 DIAGNOSIS — IMO0001 IRON AND ITS COMPOUNDS CAUSING ADVERSE EFFECT IN THERAPEUTIC USE, SEQUELA: Primary | ICD-10-CM

## 2018-02-13 PROCEDURE — 96375 TX/PRO/DX INJ NEW DRUG ADDON: CPT | Performed by: INTERNAL MEDICINE

## 2018-02-13 PROCEDURE — 96374 THER/PROPH/DIAG INJ IV PUSH: CPT | Performed by: INTERNAL MEDICINE

## 2018-02-13 PROCEDURE — 63710000001 DIPHENHYDRAMINE PER 50 MG: Performed by: INTERNAL MEDICINE

## 2018-02-13 PROCEDURE — 25010000002 FERUMOXYTOL 510 MG/17ML SOLUTION 510 MG VIAL: Performed by: INTERNAL MEDICINE

## 2018-02-13 RX ORDER — SODIUM CHLORIDE 9 MG/ML
250 INJECTION, SOLUTION INTRAVENOUS ONCE
Status: COMPLETED | OUTPATIENT
Start: 2018-02-13 | End: 2018-02-13

## 2018-02-13 RX ORDER — DIPHENHYDRAMINE HCL 25 MG
25 CAPSULE ORAL ONCE
Status: CANCELLED | OUTPATIENT
Start: 2018-02-13

## 2018-02-13 RX ORDER — DIPHENHYDRAMINE HCL 25 MG
25 CAPSULE ORAL ONCE
Status: COMPLETED | OUTPATIENT
Start: 2018-02-13 | End: 2018-02-13

## 2018-02-13 RX ORDER — SODIUM CHLORIDE 9 MG/ML
250 INJECTION, SOLUTION INTRAVENOUS ONCE
Status: CANCELLED | OUTPATIENT
Start: 2018-02-13

## 2018-02-13 RX ADMIN — DIPHENHYDRAMINE HYDROCHLORIDE 25 MG: 25 CAPSULE ORAL at 14:20

## 2018-02-13 RX ADMIN — SODIUM CHLORIDE 100 ML: 9 INJECTION, SOLUTION INTRAVENOUS at 14:20

## 2018-02-13 RX ADMIN — FERUMOXYTOL 510 MG: 510 INJECTION INTRAVENOUS at 14:54

## 2018-02-13 RX ADMIN — FAMOTIDINE 20 MG: 10 INJECTION INTRAVENOUS at 14:21

## 2018-02-19 ENCOUNTER — OFFICE VISIT (OUTPATIENT)
Dept: NEUROLOGY | Facility: CLINIC | Age: 43
End: 2018-02-19

## 2018-02-19 VITALS
HEIGHT: 65 IN | SYSTOLIC BLOOD PRESSURE: 112 MMHG | DIASTOLIC BLOOD PRESSURE: 66 MMHG | WEIGHT: 228 LBS | BODY MASS INDEX: 37.99 KG/M2 | HEART RATE: 65 BPM | OXYGEN SATURATION: 98 %

## 2018-02-19 DIAGNOSIS — I63.512 LEFT MIDDLE CEREBRAL ARTERY STROKE (HCC): ICD-10-CM

## 2018-02-19 DIAGNOSIS — Q21.12 PFO (PATENT FORAMEN OVALE): ICD-10-CM

## 2018-02-19 DIAGNOSIS — E53.8 B12 DEFICIENCY: ICD-10-CM

## 2018-02-19 DIAGNOSIS — Z86.69 HISTORY OF MIGRAINE: ICD-10-CM

## 2018-02-19 PROCEDURE — 99213 OFFICE O/P EST LOW 20 MIN: CPT | Performed by: NURSE PRACTITIONER

## 2018-02-20 ENCOUNTER — INFUSION (OUTPATIENT)
Dept: ONCOLOGY | Facility: HOSPITAL | Age: 43
End: 2018-02-20

## 2018-02-20 VITALS
HEART RATE: 89 BPM | SYSTOLIC BLOOD PRESSURE: 107 MMHG | TEMPERATURE: 98.7 F | OXYGEN SATURATION: 100 % | WEIGHT: 228.4 LBS | DIASTOLIC BLOOD PRESSURE: 65 MMHG | BODY MASS INDEX: 38.01 KG/M2

## 2018-02-20 DIAGNOSIS — D50.0 IRON DEFICIENCY ANEMIA DUE TO CHRONIC BLOOD LOSS: ICD-10-CM

## 2018-02-20 DIAGNOSIS — IMO0001 IRON AND ITS COMPOUNDS CAUSING ADVERSE EFFECT IN THERAPEUTIC USE, SEQUELA: Primary | ICD-10-CM

## 2018-02-20 PROCEDURE — 96375 TX/PRO/DX INJ NEW DRUG ADDON: CPT | Performed by: INTERNAL MEDICINE

## 2018-02-20 PROCEDURE — 96374 THER/PROPH/DIAG INJ IV PUSH: CPT | Performed by: INTERNAL MEDICINE

## 2018-02-20 PROCEDURE — 25010000002 FERUMOXYTOL 510 MG/17ML SOLUTION 510 MG VIAL: Performed by: INTERNAL MEDICINE

## 2018-02-20 PROCEDURE — 63710000001 DIPHENHYDRAMINE PER 50 MG: Performed by: INTERNAL MEDICINE

## 2018-02-20 RX ORDER — SODIUM CHLORIDE 9 MG/ML
250 INJECTION, SOLUTION INTRAVENOUS ONCE
Status: COMPLETED | OUTPATIENT
Start: 2018-02-20 | End: 2018-02-20

## 2018-02-20 RX ORDER — DIPHENHYDRAMINE HCL 25 MG
25 CAPSULE ORAL ONCE
Status: CANCELLED | OUTPATIENT
Start: 2018-02-20

## 2018-02-20 RX ORDER — SODIUM CHLORIDE 9 MG/ML
250 INJECTION, SOLUTION INTRAVENOUS ONCE
Status: CANCELLED | OUTPATIENT
Start: 2018-02-20

## 2018-02-20 RX ORDER — DIPHENHYDRAMINE HCL 25 MG
25 CAPSULE ORAL ONCE
Status: COMPLETED | OUTPATIENT
Start: 2018-02-20 | End: 2018-02-20

## 2018-02-20 RX ADMIN — FAMOTIDINE 20 MG: 10 INJECTION INTRAVENOUS at 13:23

## 2018-02-20 RX ADMIN — DIPHENHYDRAMINE HYDROCHLORIDE 25 MG: 25 CAPSULE ORAL at 13:23

## 2018-02-20 RX ADMIN — SODIUM CHLORIDE 250 ML: 900 INJECTION, SOLUTION INTRAVENOUS at 13:30

## 2018-02-20 RX ADMIN — FERUMOXYTOL 510 MG: 510 INJECTION INTRAVENOUS at 13:46

## 2018-02-21 ENCOUNTER — OFFICE VISIT (OUTPATIENT)
Dept: NEUROSURGERY | Facility: CLINIC | Age: 43
End: 2018-02-21

## 2018-02-21 VITALS
SYSTOLIC BLOOD PRESSURE: 104 MMHG | HEIGHT: 65 IN | DIASTOLIC BLOOD PRESSURE: 70 MMHG | RESPIRATION RATE: 18 BRPM | HEART RATE: 96 BPM | BODY MASS INDEX: 37.82 KG/M2 | WEIGHT: 227 LBS

## 2018-02-21 DIAGNOSIS — Z86.79 HX OF CEREBRAL ANEURYSM REPAIR: ICD-10-CM

## 2018-02-21 DIAGNOSIS — Z98.890 HX OF CEREBRAL ANEURYSM REPAIR: ICD-10-CM

## 2018-02-21 DIAGNOSIS — I67.1 INTRACRANIAL ANEURYSM: Primary | ICD-10-CM

## 2018-02-21 PROCEDURE — 99214 OFFICE O/P EST MOD 30 MIN: CPT | Performed by: NURSE PRACTITIONER

## 2018-02-21 RX ORDER — SODIUM CHLORIDE 9 MG/ML
100 INJECTION, SOLUTION INTRAVENOUS CONTINUOUS
Status: CANCELLED | OUTPATIENT
Start: 2018-03-05

## 2018-02-21 NOTE — PROGRESS NOTES
"Subjective   Patient ID: Mariaelena Mack is a 43 y.o. female is here today for follow-up aneurysm. Patient presents unaccompanied.     History of Present Illness    She returns to the office today for ongoing follow-up with known history of a cerebral aneurysm.  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 Day on November 2, 2017.  Following stent placement, she was not a Plavix responder and was transitioned to Brilinta with full dose aspirin.  She'll be scheduled for follow-up cerebral angiogram during today's office visit.    She has had heavy uterine bleeding and is scheduled to undergo D&C with the uterine ablation in mid March.  She's also had a undergo iron transfusions as result.  She denies any strokelike symptoms.  She is hopeful to get off some of the medications same.  She follows up with cardiology and late April secondary to history of PFO.  She is scheduled to undergo ADEEL for further evaluation.  She is feeling well overall.  She would like to increase her activity level.  She has been compliant with the Brilinta and aspirin.    She presents unaccompanied.      /70 (BP Location: Right arm, Patient Position: Sitting)  Pulse 96  Resp 18  Ht 165.1 cm (65\")  Wt 103 kg (227 lb)  BMI 37.77 kg/m2    The following portions of the patient's history were reviewed and updated as appropriate: allergies, current medications, past family history, past medical history, past social history, past surgical history and problem list.    Review of Systems   Constitutional: Negative for chills and fever.   HENT: Positive for rhinorrhea.    Eyes: Positive for pain (pressure behind left eye). Negative for visual disturbance.   Respiratory: Negative for cough and shortness of breath.    Cardiovascular: Negative for chest pain and palpitations.   Gastrointestinal: Negative for nausea and " vomiting.   Musculoskeletal: Negative for gait problem and neck pain.   Skin: Negative for rash and wound.   Neurological: Positive for dizziness (occ'l) and headaches. Negative for weakness.   Hematological: Bruises/bleeds easily.   Psychiatric/Behavioral: Negative for sleep disturbance.       Objective   Physical Exam   Constitutional: She is oriented to person, place, and time. Vital signs are normal. She appears well-developed and well-nourished. She is cooperative.   Very pleasant middle-aged female   HENT:   Head: Normocephalic and atraumatic.   Eyes: EOM are normal.   Neck: Neck supple.   Cardiovascular: Normal rate and intact distal pulses.    Pulmonary/Chest: Effort normal and breath sounds normal. No respiratory distress. She has no wheezes.   Abdominal: Soft.   Musculoskeletal: Normal range of motion. She exhibits no tenderness or deformity.   Neurological: She is alert and oriented to person, place, and time. She has normal strength. No cranial nerve deficit. Coordination and gait normal. GCS eye subscore is 4. GCS verbal subscore is 5. GCS motor subscore is 6.   Cranial nerves II through XII grossly intact  Stable upright gait and station   Skin: Skin is warm and dry.   Psychiatric: She has a normal mood and affect. Her behavior is normal. Judgment and thought content normal.   Vitals reviewed.    Neurologic Exam     Mental Status   Oriented to person, place, and time.     Cranial Nerves     CN III, IV, VI   Extraocular motions are normal.     Motor Exam     Strength   Strength 5/5 throughout.       Assessment/Plan   Independent Review of Radiographic Studies:    No new imaging    Consulting providers notes reviewed    Medical Decision Making:    She returns the office today 3 months after undergoing cerebral angiogram and stent placement for a very large cerebral aneurysm.  Exam as noted above, no red flags.  Following that procedure she was started on Brilinta and aspirin and she has been compliant  with these medications.  She will be scheduled for follow-up cerebral angiogram for ongoing aneurysm surveillance.  She denies any new strokelike symptoms.  She has been followed closely by neurology.  Follow up with cardiology for the known PFO recently April.  She is to continue the Brilinta and aspirin until told otherwise.  Cerebral angiogram to be scheduled after today's visit.  We prefer that she undergo the cerebral angiogram before having the uterine ablation for that she does not have to be off the Brilinta and aspirin until we know that it is safe to do so with regard to the stent.        Plan: Cerebral angiogram with Dr. Day to be scheduled, return to office for seen in follow-up afterwards      Addendum: After talking with Dr. Day regarding need for Brilinta to be held for upcoming procedures, he thinks it would be best to wait 6 months before having her hold either the full dose aspirin or the antiplatelet medication to decrease the risk of stroke, unless there is a life threatening issue.  Therefore, we will have the patient return the office in 3 months and she will be scheduled for angiogram at that time.  I discussed this with the patient and she is aware that from our standpoint Brilinta and aspirin she be continued daily for a full 6 months without any interruption.  She will need to postpone her ablation procedure as this would require her to be off both of his medications for at least 5 days.  She is aware and expressed understanding.  Follow-up appointment has been rescheduled for 3 months.        Mariaelena was seen today for cerebral aneurysm.    Diagnoses and all orders for this visit:    Intracranial aneurysm  -     Case Request; Standing  -     CBC and Differential; Future  -     Comprehensive metabolic panel; Future  -     Protime-INR; Future  -     Urinalysis With / Culture If Indicated - Urine, Clean Catch  -     Sedimentation rate; Future  -     sodium chloride 0.9 % infusion;  Infuse 100 mL/hr into a venous catheter Continuous.  -     P2Y12 Platelet Inhibition; Future  -     ECG 12 Lead; Future  -     Case Request    Hx of cerebral aneurysm repair  -     Case Request; Standing  -     CBC and Differential; Future  -     Comprehensive metabolic panel; Future  -     Protime-INR; Future  -     Urinalysis With / Culture If Indicated - Urine, Clean Catch  -     Sedimentation rate; Future  -     sodium chloride 0.9 % infusion; Infuse 100 mL/hr into a venous catheter Continuous.  -     P2Y12 Platelet Inhibition; Future  -     ECG 12 Lead; Future  -     Case Request  -     Lipid Panel; Future    Other orders  -     Follow Anesthesia Guidelines / Standing Orders; Future  -     Obtain informed consent  -     Follow Anesthesia Guidelines / Standing Orders; Standing  -     Verify NPO Status; Standing  -     POC Glucose Once; Standing  -     Outpatient In A Bed; Standing      Return in about 3 months (around 5/21/2018).

## 2018-02-26 ENCOUNTER — RESULTS ENCOUNTER (OUTPATIENT)
Dept: NEUROSURGERY | Facility: CLINIC | Age: 43
End: 2018-02-26

## 2018-02-26 DIAGNOSIS — Z98.890 HX OF CEREBRAL ANEURYSM REPAIR: ICD-10-CM

## 2018-02-26 DIAGNOSIS — I67.1 INTRACRANIAL ANEURYSM: ICD-10-CM

## 2018-02-26 DIAGNOSIS — Z86.79 HX OF CEREBRAL ANEURYSM REPAIR: ICD-10-CM

## 2018-02-28 ENCOUNTER — APPOINTMENT (OUTPATIENT)
Dept: PREADMISSION TESTING | Facility: HOSPITAL | Age: 43
End: 2018-02-28

## 2018-03-12 ENCOUNTER — APPOINTMENT (OUTPATIENT)
Dept: PREADMISSION TESTING | Facility: HOSPITAL | Age: 43
End: 2018-03-12

## 2018-04-16 ENCOUNTER — APPOINTMENT (OUTPATIENT)
Dept: CARDIOLOGY | Facility: HOSPITAL | Age: 43
End: 2018-04-16
Attending: EMERGENCY MEDICINE

## 2018-04-16 ENCOUNTER — HOSPITAL ENCOUNTER (EMERGENCY)
Facility: HOSPITAL | Age: 43
Discharge: HOME | End: 2018-04-16
Attending: EMERGENCY MEDICINE | Admitting: EMERGENCY MEDICINE

## 2018-04-16 VITALS
OXYGEN SATURATION: 100 % | SYSTOLIC BLOOD PRESSURE: 101 MMHG | RESPIRATION RATE: 18 BRPM | HEART RATE: 79 BPM | TEMPERATURE: 99.9 F | BODY MASS INDEX: 35.36 KG/M2 | WEIGHT: 220 LBS | DIASTOLIC BLOOD PRESSURE: 85 MMHG | HEIGHT: 66 IN

## 2018-04-16 DIAGNOSIS — S80.12XA CONTUSION OF LEFT LOWER LEG, INITIAL ENCOUNTER: Primary | ICD-10-CM

## 2018-04-16 LAB
ALBUMIN SERPL-MCNC: 4.5 G/DL (ref 3.5–5.2)
ALBUMIN/GLOB SERPL: 1.7 G/DL
ALP SERPL-CCNC: 39 U/L (ref 39–117)
ALT SERPL W P-5'-P-CCNC: 14 U/L (ref 1–33)
ANION GAP SERPL CALCULATED.3IONS-SCNC: 13.6 MMOL/L
AST SERPL-CCNC: 17 U/L (ref 1–32)
BASOPHILS # BLD AUTO: 0.02 10*3/MM3 (ref 0–0.2)
BASOPHILS NFR BLD AUTO: 0.3 % (ref 0–1.5)
BH CV LOWER VASCULAR LEFT COMMON FEMORAL AUGMENT: NORMAL
BH CV LOWER VASCULAR LEFT COMMON FEMORAL COMPETENT: NORMAL
BH CV LOWER VASCULAR LEFT COMMON FEMORAL COMPRESS: NORMAL
BH CV LOWER VASCULAR LEFT COMMON FEMORAL PHASIC: NORMAL
BH CV LOWER VASCULAR LEFT COMMON FEMORAL SPONT: NORMAL
BH CV LOWER VASCULAR LEFT DISTAL FEMORAL COMPRESS: NORMAL
BH CV LOWER VASCULAR LEFT GASTRONEMIUS COMPRESS: NORMAL
BH CV LOWER VASCULAR LEFT GREATER SAPH AK COMPRESS: NORMAL
BH CV LOWER VASCULAR LEFT GREATER SAPH BK COMPRESS: NORMAL
BH CV LOWER VASCULAR LEFT LESSER SAPH COMPRESS: NORMAL
BH CV LOWER VASCULAR LEFT MID FEMORAL AUGMENT: NORMAL
BH CV LOWER VASCULAR LEFT MID FEMORAL COMPETENT: NORMAL
BH CV LOWER VASCULAR LEFT MID FEMORAL COMPRESS: NORMAL
BH CV LOWER VASCULAR LEFT MID FEMORAL PHASIC: NORMAL
BH CV LOWER VASCULAR LEFT MID FEMORAL SPONT: NORMAL
BH CV LOWER VASCULAR LEFT PERONEAL COMPRESS: NORMAL
BH CV LOWER VASCULAR LEFT POPLITEAL AUGMENT: NORMAL
BH CV LOWER VASCULAR LEFT POPLITEAL COMPETENT: NORMAL
BH CV LOWER VASCULAR LEFT POPLITEAL COMPRESS: NORMAL
BH CV LOWER VASCULAR LEFT POPLITEAL PHASIC: NORMAL
BH CV LOWER VASCULAR LEFT POPLITEAL SPONT: NORMAL
BH CV LOWER VASCULAR LEFT POSTERIOR TIBIAL COMPRESS: NORMAL
BH CV LOWER VASCULAR LEFT PROXIMAL FEMORAL COMPRESS: NORMAL
BH CV LOWER VASCULAR LEFT SAPHENOFEMORAL JUNCTION AUGMENT: NORMAL
BH CV LOWER VASCULAR LEFT SAPHENOFEMORAL JUNCTION COMPETENT: NORMAL
BH CV LOWER VASCULAR LEFT SAPHENOFEMORAL JUNCTION COMPRESS: NORMAL
BH CV LOWER VASCULAR LEFT SAPHENOFEMORAL JUNCTION PHASIC: NORMAL
BH CV LOWER VASCULAR LEFT SAPHENOFEMORAL JUNCTION SPONT: NORMAL
BH CV LOWER VASCULAR RIGHT COMMON FEMORAL AUGMENT: NORMAL
BH CV LOWER VASCULAR RIGHT COMMON FEMORAL COMPETENT: NORMAL
BH CV LOWER VASCULAR RIGHT COMMON FEMORAL COMPRESS: NORMAL
BH CV LOWER VASCULAR RIGHT COMMON FEMORAL PHASIC: NORMAL
BH CV LOWER VASCULAR RIGHT COMMON FEMORAL SPONT: NORMAL
BILIRUB SERPL-MCNC: 0.3 MG/DL (ref 0.1–1.2)
BUN BLD-MCNC: 10 MG/DL (ref 6–20)
BUN/CREAT SERPL: 14.7 (ref 7–25)
CALCIUM SPEC-SCNC: 9.4 MG/DL (ref 8.6–10.5)
CHLORIDE SERPL-SCNC: 103 MMOL/L (ref 98–107)
CO2 SERPL-SCNC: 24.4 MMOL/L (ref 22–29)
CREAT BLD-MCNC: 0.68 MG/DL (ref 0.57–1)
DEPRECATED RDW RBC AUTO: 55 FL (ref 37–54)
EOSINOPHIL # BLD AUTO: 0.06 10*3/MM3 (ref 0–0.7)
EOSINOPHIL NFR BLD AUTO: 0.8 % (ref 0.3–6.2)
ERYTHROCYTE [DISTWIDTH] IN BLOOD BY AUTOMATED COUNT: 15.6 % (ref 11.7–13)
GFR SERPL CREATININE-BSD FRML MDRD: 94 ML/MIN/1.73
GLOBULIN UR ELPH-MCNC: 2.6 GM/DL
GLUCOSE BLD-MCNC: 97 MG/DL (ref 65–99)
HCG SERPL QL: NEGATIVE
HCT VFR BLD AUTO: 36.5 % (ref 35.6–45.5)
HGB BLD-MCNC: 11 G/DL (ref 11.9–15.5)
HOLD SPECIMEN: NORMAL
IMM GRANULOCYTES # BLD: 0 10*3/MM3 (ref 0–0.03)
IMM GRANULOCYTES NFR BLD: 0 % (ref 0–0.5)
LYMPHOCYTES # BLD AUTO: 2.38 10*3/MM3 (ref 0.9–4.8)
LYMPHOCYTES NFR BLD AUTO: 30.4 % (ref 19.6–45.3)
MCH RBC QN AUTO: 29 PG (ref 26.9–32)
MCHC RBC AUTO-ENTMCNC: 30.1 G/DL (ref 32.4–36.3)
MCV RBC AUTO: 96.3 FL (ref 80.5–98.2)
MONOCYTES # BLD AUTO: 0.44 10*3/MM3 (ref 0.2–1.2)
MONOCYTES NFR BLD AUTO: 5.6 % (ref 5–12)
NEUTROPHILS # BLD AUTO: 4.93 10*3/MM3 (ref 1.9–8.1)
NEUTROPHILS NFR BLD AUTO: 62.9 % (ref 42.7–76)
PLATELET # BLD AUTO: 363 10*3/MM3 (ref 140–500)
PMV BLD AUTO: 12.3 FL (ref 6–12)
POTASSIUM BLD-SCNC: 4.4 MMOL/L (ref 3.5–5.2)
PROT SERPL-MCNC: 7.1 G/DL (ref 6–8.5)
RBC # BLD AUTO: 3.79 10*6/MM3 (ref 3.9–5.2)
SODIUM BLD-SCNC: 141 MMOL/L (ref 136–145)
WBC NRBC COR # BLD: 7.83 10*3/MM3 (ref 4.5–10.7)
WHOLE BLOOD HOLD SPECIMEN: NORMAL
WHOLE BLOOD HOLD SPECIMEN: NORMAL

## 2018-04-16 PROCEDURE — 93971 EXTREMITY STUDY: CPT

## 2018-04-16 PROCEDURE — 36415 COLL VENOUS BLD VENIPUNCTURE: CPT

## 2018-04-16 PROCEDURE — 99283 EMERGENCY DEPT VISIT LOW MDM: CPT

## 2018-04-16 PROCEDURE — 85025 COMPLETE CBC W/AUTO DIFF WBC: CPT

## 2018-04-16 PROCEDURE — 84703 CHORIONIC GONADOTROPIN ASSAY: CPT

## 2018-04-16 PROCEDURE — 80053 COMPREHEN METABOLIC PANEL: CPT

## 2018-04-16 NOTE — ED PROVIDER NOTES
EMERGENCY DEPARTMENT ENCOUNTER    CHIEF COMPLAINT  Chief Complaint: leg pain  History given by: pt  History limited by: nothing  Room Number: 46/46  PMD: India Stubbs MD      HPI:  Pt is a 43 y.o. female who presents complaining of L lower leg pain. Pt reports that earlier today she felt a knot on the back of her L leg and it was tender to the touch. She denies any fall or known injury to the area. Pt had a hx of stroke for brain aneurysm in October of 2017, and is worried about a blood clot in her leg today.  Pt denies CP, SOA, recent immobility, long trips.    Duration:  today  Onset: sudden  Timing: constant  Location: LLE  Radiation: none  Quality: bump  Intensity/Severity: mild  Progression: stable  Associated Symptoms: none  Aggravating Factors: palpation  Alleviating Factors: none  Previous Episodes: No previous episodes of this noted.  Treatment before arrival: No tx prior to arrival.    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   • Iron deficiency anemia due to chronic blood loss 11/02/2017   • Iron and its compounds causing adverse effect in therapeutic use 11/06/2017   • Hx of cerebral aneurysm repair 11/13/2017   • PFO (patent foramen ovale) 02/19/2018   • B12 deficiency 02/19/2018   • History of migraine 02/19/2018     Resolved Ambulatory Problems     Diagnosis Date Noted   • No Resolved Ambulatory Problems     Past Medical History:   Diagnosis Date   • Bulging of thoracic intervertebral disc    • Headache, tension-type    • Internal carotid aneurysm    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   • Migraine    • PFO (patent foramen ovale)        PAST SURGICAL HISTORY  Past Surgical History:   Procedure Laterality Date   • EMBOLIZATION CEREBRAL N/A 11/2/2017    Procedure: Cerebral angiogram and embolization of cerebral angiogram with  Pipeline Embolization Device and coils.;  Surgeon: Roger Day MD;  Location: [facility] OR 18/19;  Service:        FAMILY HISTORY  Family History   Problem Relation Age of Onset   • Migraines Mother    • Heart failure Mother    • Stroke Mother    • Stroke Maternal Grandmother    • Diabetes Maternal Grandmother    • Heart disease Father    • Cancer Sister    • Stroke Sister    • Diabetes Brother        SOCIAL HISTORY  Social History     Social History   • Marital status:      Spouse name: Silvestre   • Number of children: N/A   • Years of education: N/A     Occupational History   •  [employer]     full time     Social History Main Topics   • Smoking status: Former Smoker     Packs/day: 0.00     Types: Electronic Cigarette     Quit date: 2015   • Smokeless tobacco: Never Used      Comment: uses Vaporizor   • Alcohol use 12.0 oz/week     20 Cans of beer per week      Comment: weekends   • Drug use: No   • Sexual activity: Yes     Partners: Male     Birth control/ protection: None     Other Topics Concern   • Not on file     Social History Narrative    Patient lives with  and socially drinks fairly heavily most weekends. States 6-8 beers on weekend nights. Less on weekdays.       ALLERGIES  Review of patient's allergies indicates no known allergies.    REVIEW OF SYSTEMS  Review of Systems   Constitutional: Negative for chills and fever.   HENT: Negative for rhinorrhea and sore throat.    Eyes: Negative for visual disturbance.   Respiratory: Negative for cough and shortness of breath.    Cardiovascular: Negative for chest pain, palpitations and leg swelling.   Gastrointestinal: Negative for abdominal pain, diarrhea and vomiting.   Endocrine: Negative.    Genitourinary: Negative for decreased urine volume, dysuria and frequency.   Musculoskeletal: Positive for myalgias (L lower leg). Negative for neck pain.   Skin: Positive for wound (L lower leg). Negative for  rash.   Neurological: Negative for syncope and headaches.   Psychiatric/Behavioral: Negative.    All other systems reviewed and are negative.      PHYSICAL EXAM  ED Triage Vitals   Temp Heart Rate Resp BP SpO2   04/16/18 1200 04/16/18 1200 04/16/18 1202 04/16/18 1204 04/16/18 1200   99.9 °F (37.7 °C) 86 16 136/84 100 %      Temp src Heart Rate Source Patient Position BP Location FiO2 (%)   -- -- -- -- --              Physical Exam   Constitutional: She is oriented to person, place, and time.  Non-toxic appearance. She appears distressed (mild).   HENT:   Head: Normocephalic and atraumatic.   Eyes: EOM are normal.   Neck: Normal range of motion. Neck supple.   Cardiovascular: Normal rate, regular rhythm, normal heart sounds and intact distal pulses.    No murmur heard.  Pulses:       Dorsalis pedis pulses are 2+ on the right side, and 2+ on the left side.        Posterior tibial pulses are 2+ on the right side, and 2+ on the left side.   Pulmonary/Chest: Effort normal and breath sounds normal. No respiratory distress.   Abdominal: Soft. Bowel sounds are normal. There is no tenderness. There is no rebound and no guarding.   Musculoskeletal: Normal range of motion. She exhibits no edema.   Soft tissue tenderness (mild) to L gastroc, ecchymosis   Neurological: She is alert and oriented to person, place, and time.   Skin: Skin is warm and dry.   Psychiatric: Affect normal.   Nursing note and vitals reviewed.      LAB RESULTS  Lab Results (last 24 hours)     Procedure Component Value Units Date/Time    CBC & Differential [[id number]] Collected:  04/16/18 1253    Specimen:  Blood Updated:  04/16/18 1341    Narrative:       The following orders were created for panel order CBC & Differential.  Procedure                               Abnormality         Status                     ---------                               -----------         ------                     CBC Auto Differential[[id number]]        Abnormal             Final result                 Please view results for these tests on the individual orders.    Comprehensive Metabolic Panel [[id number]] Collected:  04/16/18 1253    Specimen:  Blood Updated:  04/16/18 1402     Glucose 97 mg/dL      BUN 10 mg/dL      Creatinine 0.68 mg/dL      Sodium 141 mmol/L      Potassium 4.4 mmol/L      Chloride 103 mmol/L      CO2 24.4 mmol/L      Calcium 9.4 mg/dL      Total Protein 7.1 g/dL      Albumin 4.50 g/dL      ALT (SGPT) 14 U/L      AST (SGOT) 17 U/L      Alkaline Phosphatase 39 U/L      Total Bilirubin 0.3 mg/dL      eGFR Non African Amer 94 mL/min/1.73      Globulin 2.6 gm/dL      A/G Ratio 1.7 g/dL      BUN/Creatinine Ratio 14.7     Anion Gap 13.6 mmol/L     hCG, Serum, Qualitative [[id number]]  (Normal) Collected:  04/16/18 1253    Specimen:  Blood Updated:  04/16/18 1406     HCG Qualitative Negative    CBC Auto Differential [[id number]]  (Abnormal) Collected:  04/16/18 1253    Specimen:  Blood Updated:  04/16/18 1341     WBC 7.83 10*3/mm3      RBC 3.79 (L) 10*6/mm3      Hemoglobin 11.0 (L) g/dL      Hematocrit 36.5 %      MCV 96.3 fL      MCH 29.0 pg      MCHC 30.1 (L) g/dL      RDW 15.6 (H) %      RDW-SD 55.0 (H) fl      MPV 12.3 (H) fL      Platelets 363 10*3/mm3      Neutrophil % 62.9 %      Lymphocyte % 30.4 %      Monocyte % 5.6 %      Eosinophil % 0.8 %      Basophil % 0.3 %      Immature Grans % 0.0 %      Neutrophils, Absolute 4.93 10*3/mm3      Lymphocytes, Absolute 2.38 10*3/mm3      Monocytes, Absolute 0.44 10*3/mm3      Eosinophils, Absolute 0.06 10*3/mm3      Basophils, Absolute 0.02 10*3/mm3      Immature Grans, Absolute 0.00 10*3/mm3           I ordered the above labs and reviewed the results    RADIOLOGY  No orders to display      DVT Study- negative.    I ordered the above noted radiological studies. Interpreted by radiologist. Reviewed by me in PACS.       PROCEDURES  Procedures      PROGRESS AND CONSULTS  ED Course     1610- Initial pt check. I discussed that pt  DVT study is negative. I encouraged her that it is just a small, harmless internal injury to the area. Pt will be cleared for discharge. Pt understands and agrees with the plan, all questions answered.      MEDICAL DECISION MAKING  Results were reviewed/discussed with the patient and they were also made aware of online access. Pt also made aware that some labs, such as cultures, will not be resulted during ER visit and follow up with PMD is necessary.     MDM  Number of Diagnoses or Management Options  Contusion of left lower leg, initial encounter:      Amount and/or Complexity of Data Reviewed  Clinical lab tests: reviewed and ordered (hCG- negative, Hgb- 11.0)  Tests in the radiology section of CPT®: reviewed and ordered (DVT study- negative)           DIAGNOSIS  Final diagnoses:   Contusion of left lower leg, initial encounter       DISPOSITION  DISCHARGE    Patient discharged in stable condition.    Reviewed implications of results, diagnosis, meds, responsibility to follow up, warning signs and symptoms of possible worsening, potential complications and reasons to return to ER, including severe increase of pain.    Patient/Family voiced understanding of above instructions.    Discussed plan for discharge, as there is no emergent indication for admission. Patient referred to primary care provider for BP management due to today's BP. Pt/family is agreeable and understands need for follow up and repeat testing.  Pt is aware that discharge does not mean that nothing is wrong but it indicates no emergency is present that requires admission and they must continue care with follow-up as given below or physician of their choice.     FOLLOW-UP  India Stubbs MD  [id number] [facility] [address]  [phone]    Schedule an appointment as soon as possible for a visit in 1 week  As needed         Medication List      No changes were made to your prescriptions during this visit.           Latest Documented  Vital Signs:  As of 4:22 PM  BP- 101/85 HR- 79 Temp- 99.9 °F (37.7 °C) O2 sat- 100%    --  Documentation assistance provided by bautista Carpenter for Dr. Magallon.  Information recorded by the scribe was done at my direction and has been verified and validated by me.     Colin Carpenter  04/16/18 7231       India Magallon MD  04/18/18 1010

## 2018-04-16 NOTE — DISCHARGE INSTRUCTIONS
You are advised to follow closely with Dr. Stubbs in 3-5 days for recheck, final results of lab work and imaging testing, and further testing/treatment as needed.    Please return to the emergency department immediately with chest pain different than usual for you, shortness of air, abdominal pain, persistent vomiting/fever, blood in emesis or stool, lightheadedness/fainting, problems with speech, one sided weakness/numbness, new incontinence, problems with vision,  or for worsening of symptoms or other concerns.

## 2018-04-30 ENCOUNTER — OFFICE VISIT (OUTPATIENT)
Dept: CARDIOLOGY | Facility: CLINIC | Age: 43
End: 2018-04-30

## 2018-04-30 VITALS
BODY MASS INDEX: 37.9 KG/M2 | HEART RATE: 77 BPM | SYSTOLIC BLOOD PRESSURE: 128 MMHG | WEIGHT: 235.8 LBS | HEIGHT: 66 IN | OXYGEN SATURATION: 100 % | DIASTOLIC BLOOD PRESSURE: 78 MMHG

## 2018-04-30 DIAGNOSIS — I51.7 RIGHT VENTRICULAR ENLARGEMENT: ICD-10-CM

## 2018-04-30 DIAGNOSIS — Q21.12 PFO (PATENT FORAMEN OVALE): ICD-10-CM

## 2018-04-30 DIAGNOSIS — I63.032 CEREBROVASCULAR ACCIDENT (CVA) DUE TO THROMBOSIS OF LEFT CAROTID ARTERY (HCC): Primary | ICD-10-CM

## 2018-04-30 PROCEDURE — 99213 OFFICE O/P EST LOW 20 MIN: CPT | Performed by: INTERNAL MEDICINE

## 2018-05-10 NOTE — PROGRESS NOTES
History:     Reason for follow up:   Iron deficiency anemia secondary to menorrhagia       HPI:  Mariaelena Mack presents for follow-up of Her iron deficiency anemia secondary to menorrhagia. Patient last received IV iron in February 2018 in the form of Feraheme ×2 doses. Upon review of her records, she presented to the emergency department on April 18, 2018 with points of left lower leg pain and not in the back of her leg.  She underwent a left lower extremity Doppler that was negative for DVT.  Her labs showed improvement of her hemoglobin after Feraheme from 9.4 up to 11. ***    Past Medical   Past Medical History:   Diagnosis Date   • Bulging of thoracic intervertebral disc     over 20 yrs ago   • Cerebral aneurysm    • Headache, tension-type    • Internal carotid aneurysm     Left ICA aneurysm with partial thrombosis 10/29/17 - resulting in left MCA CVA.   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   • Migraine    • PFO (patent foramen ovale)    • Stroke    • Tattoos     and   Patient Active Problem List   Diagnosis   • Intracranial aneurysm   • Left middle cerebral artery stroke   • Iron deficiency anemia due to chronic blood loss   • Iron and its compounds causing adverse effect in therapeutic use   • Hx of cerebral aneurysm repair   • PFO (patent foramen ovale)   • B12 deficiency   • History of migraine     Social History   Social History     Social History   • Marital status:      Spouse name: Silvestre   • Number of children: 1   • Years of education: College     Occupational History   •  [employer]     full-time     Social History Main Topics   • Smoking status: Former Smoker     Packs/day: 1.00     Years: 21.00     Types: Electronic Cigarette, Cigarettes     Quit date: 2015   • Smokeless tobacco: Never Used      Comment: uses Vaporizor   • Alcohol use 12.0 oz/week     20 Cans of beer per week      Comment: weekends   •  Drug use: No   • Sexual activity: Yes     Partners: Male     Birth control/ protection: None     Other Topics Concern   • Not on file     Social History Narrative    Patient lives with  and socially drinks fairly heavily most weekends. States 6-8 beers on weekend nights. Less on weekdays.     Family History  Family History   Problem Relation Age of Onset   • Migraines Mother    • Heart failure Mother    • Stroke Mother    • Stroke Maternal Grandmother    • Diabetes Maternal Grandmother    • Heart disease Father    • Cancer Sister    • Stroke Sister    • Breast cancer Sister 54   • Bone cancer Sister 54   • Diabetes Brother    • Leukemia Paternal Uncle      Allergies  No Known Allergies    Medications: The current medication list was reviewed in the EMR.    Review of Systems  Review of Systems   Constitutional: Negative for activity change, appetite change, chills, diaphoresis, fatigue, fever and unexpected weight change.   Respiratory: Negative for cough, chest tightness and shortness of breath.    Cardiovascular: Negative for chest pain, palpitations and leg swelling.   Gastrointestinal: Negative for abdominal pain, blood in stool, constipation, diarrhea, nausea and vomiting.   Musculoskeletal: Negative for arthralgias, joint swelling and myalgias.   Hematological: Negative for adenopathy. Does not bruise/bleed easily.         Objective    Objective:     There were no vitals filed for this visit.  Current Status 11/10/2017   ECOG score 0     GENERAL: female comfortable, no acute distress  SKIN:  Warm, without rashes, purpura or petechiae.   EYES:  EOMs intact.  Conjunctivae normal. Pupils equal and reactive to light.   EARS:  Hearing intact.  LYMPHATICS:  No cervical, supraclavicular, axillary adenopathy.  RESP:  Lungs clear to percussion and auscultation. Good airflow. Normal effort.   CHEST: Mediport -{YES-DESCRIBE/NO:92987}; Breast exam - {YES-DESCRIBE/NO:39289}  CARDIAC:  Regular rate and rhythm without  murmurs, rubs or gallops. Normal S1,S2. Lower extremity edema:  {YES-DESCRIBE/NO:58817}  GI:  Soft, nontender, normal bowel sounds, no hepatosplenomegaly  PSYCHIATRIC:  Normal affect and mood; alert and oriented x 3; Insight and judgement appropriate         Labs and Imaging  Results for orders placed or performed during the hospital encounter of 04/16/18   Comprehensive Metabolic Panel   Result Value Ref Range    Glucose 97 65 - 99 mg/dL    BUN 10 6 - 20 mg/dL    Creatinine 0.68 0.57 - 1.00 mg/dL    Sodium 141 136 - 145 mmol/L    Potassium 4.4 3.5 - 5.2 mmol/L    Chloride 103 98 - 107 mmol/L    CO2 24.4 22.0 - 29.0 mmol/L    Calcium 9.4 8.6 - 10.5 mg/dL    Total Protein 7.1 6.0 - 8.5 g/dL    Albumin 4.50 3.50 - 5.20 g/dL    ALT (SGPT) 14 1 - 33 U/L    AST (SGOT) 17 1 - 32 U/L    Alkaline Phosphatase 39 39 - 117 U/L    Total Bilirubin 0.3 0.1 - 1.2 mg/dL    eGFR Non African Amer 94 >60 mL/min/1.73    Globulin 2.6 gm/dL    A/G Ratio 1.7 g/dL    BUN/Creatinine Ratio 14.7 7.0 - 25.0    Anion Gap 13.6 mmol/L   hCG, Serum, Qualitative   Result Value Ref Range    HCG Qualitative Negative Indeterminate, Negative   CBC Auto Differential   Result Value Ref Range    WBC 7.83 4.50 - 10.70 10*3/mm3    RBC 3.79 (L) 3.90 - 5.20 10*6/mm3    Hemoglobin 11.0 (L) 11.9 - 15.5 g/dL    Hematocrit 36.5 35.6 - 45.5 %    MCV 96.3 80.5 - 98.2 fL    MCH 29.0 26.9 - 32.0 pg    MCHC 30.1 (L) 32.4 - 36.3 g/dL    RDW 15.6 (H) 11.7 - 13.0 %    RDW-SD 55.0 (H) 37.0 - 54.0 fl    MPV 12.3 (H) 6.0 - 12.0 fL    Platelets 363 140 - 500 10*3/mm3    Neutrophil % 62.9 42.7 - 76.0 %    Lymphocyte % 30.4 19.6 - 45.3 %    Monocyte % 5.6 5.0 - 12.0 %    Eosinophil % 0.8 0.3 - 6.2 %    Basophil % 0.3 0.0 - 1.5 %    Immature Grans % 0.0 0.0 - 0.5 %    Neutrophils, Absolute 4.93 1.90 - 8.10 10*3/mm3    Lymphocytes, Absolute 2.38 0.90 - 4.80 10*3/mm3    Monocytes, Absolute 0.44 0.20 - 1.20 10*3/mm3    Eosinophils, Absolute 0.06 0.00 - 0.70 10*3/mm3    Basophils,  Absolute 0.02 0.00 - 0.20 10*3/mm3    Immature Grans, Absolute 0.00 0.00 - 0.03 10*3/mm3   Light Blue Top   Result Value Ref Range    Extra Tube hold for add-on    Green Top (Gel)   Result Value Ref Range    Extra Tube Hold for add-ons.    Lavender Top   Result Value Ref Range    Extra Tube hold for add-on    Duplex Venous Lower Extremity - Left   Result Value Ref Range    Right Common Femoral Spont Y     Right Common Femoral Phasic Y     Right Common Femoral Augment Y     Right Common Femoral Competent Y     Right Common Femoral Compress C     Left Common Femoral Spont Y     Left Common Femoral Phasic Y     Left Common Femoral Augment Y     Left Common Femoral Competent Y     Left Common Femoral Compress C     Left Saphenofemoral Junction Spont Y     Left Saphenofemoral Junction Phasic Y     Left Saphenofemoral Junction Augment Y     Left Saphenofemoral Junction Competent Y     Left Saphenofemoral Junction Compress C     Left Proximal Femoral Compress C     Left Mid Femoral Spont Y     Left Mid Femoral Phasic Y     Left Mid Femoral Augment Y     Left Mid Femoral Competent Y     Left Mid Femoral Compress C     Left Distal Femoral Compress C     Left Popliteal Spont Y     Left Popliteal Phasic Y     Left Popliteal Augment Y     Left Popliteal Competent Y     Left Popliteal Compress C     Left Posterior Tibial Compress C     Left Peroneal Compress C     Left GastronemiusSoleal Compress C     Left Greater Saph AK Compress C     Left Greater Saph BK Compress C     Left Lesser Saph Compress C          Assessment/Plan   Assessment   This is a 42 y.o. female with:      1. Iron deficiency anemia secondary to menorrhagia, unresponsive to oral iron   * Status post 1 unit packed RBCs and 1 dose of Venofer while inpatient in early November 2017.   * Patient received two doses of IV Feraheme last in February   * ***      2. Menorrhagia   * Following with gynecology.       3. Left cerebral artery stroke in setting of left  internal carotid artery partially thrombosed aneurysm   * Initially started on aspirin and Plavix however T2Y12 was not showing antiplatelet affect.  She was then loaded with Brilenta and then went for pipeline embolization on 11/2/2017   * She was discharged on Brilenta and full dose aspirin      Plan:     1. ***    Follow-up in 3 months, 6 months. I asked the patient to call for any questions, concerns, or new symptoms.

## 2018-05-11 ENCOUNTER — APPOINTMENT (OUTPATIENT)
Dept: ONCOLOGY | Facility: CLINIC | Age: 43
End: 2018-05-11

## 2018-05-11 ENCOUNTER — APPOINTMENT (OUTPATIENT)
Dept: LAB | Facility: HOSPITAL | Age: 43
End: 2018-05-11

## 2018-05-13 NOTE — PROGRESS NOTES
Date of Office Visit: 2018  Encounter Provider: William Patino MD  Place of Service: [facility]  Patient Name: Mariaelena Mack  :1975    Chief complaint: Follow-up for left ICA aneurysm with partial thrombosis, left MCA   CVA, atrial shunt, RV enlargement.     History of Present Illness:       I again had the pleasure of seeing the patient in cardiology office on 2018.  She is a   very pleasant 43 year-old white female with a past medical history significant for a left   internal carotid artery aneurysm, left MCA CVA, and atrial shunt who presents for follow-up.    I initially saw the patient in the hospital on 10/30/2017.  She had been admitted with   headache, speech impairment, and right-sided facial weakness and numbness.  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  She   also had a left MCA distribution CVA with a nonocclusive proximal thrombus, from the left   internal carotid artery aneurysm.  She ultimately underwent a cerebral angiogram and   embolization of the aneurysm with a Pipeline embolization device and coils on 2017   by Dr. Day.  While hospitalized, she did have an echocardiogram performed on   10/29/2017 which showed an ejection fraction of 60-65%, normal diastolic function, mildly   enlarged right ventricle, and a moderately positive saline study suggestive of an atrial level   shunt.     The patient presents today for follow-up.  She is doing well, and has had no further   neurological symptoms.  Her blood pressure has been good at home.  She still needs a   follow-up cerebral angiogram, and is scheduling this soon with Dr. Day.  She has not   had any cardiac symptoms.    Past Medical History:   Diagnosis Date   • Bulging of thoracic intervertebral disc     over 20 yrs ago   • Cerebral aneurysm    • Headache, tension-type    • Internal carotid aneurysm     Left ICA  aneurysm with partial thrombosis 10/29/17 - resulting in left MCA CVA.   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 30 tablet 11   • atorvastatin (LIPITOR) 40 MG tablet Take 1 tablet by mouth Every Night. 30 tablet 1   • cetirizine (zyrTEC) 10 MG tablet Take 10 mg by mouth Daily As Needed for Allergies.     • Docusate Calcium (STOOL SOFTENER PO) Take  by mouth As Needed.     • Multiple Vitamin (MULTI-VITAMIN DAILY PO) Take  by mouth Daily.     • ticagrelor (BRILINTA) 60 MG tablet tablet Take 1 tablet by mouth 2 (Two) Times a Day. 180 tablet 2   • vitamin B-12 (CYANOCOBALAMIN) 1000 MCG tablet Take 1,000 mcg by mouth Daily.       No current facility-administered medications on file prior to visit.      Allergies as of 04/30/2018   • (No Known Allergies)     Social History     Social History   • Marital status:      Spouse name: Silvestre   • Number of children: 1   • Years of education: College     Occupational History   •  [employer]     full-time     Social History Main Topics   • Smoking status: Former Smoker     Packs/day: 1.00     Years: 21.00     Types: Electronic Cigarette, Cigarettes     Quit date: 2015   • Smokeless tobacco: Never Used      Comment: uses Vaporizor   • Alcohol use 12.0 oz/week     20 Cans of beer per week      Comment: weekends   • Drug use: No   • Sexual activity: Yes     Partners: Male     Birth  "control/ protection: None     Other Topics Concern   • Not on file     Social History Narrative    Patient lives with  and socially drinks fairly heavily most weekends. States 6-8 beers on weekend nights. Less on weekdays.     Family History   Problem Relation Age of Onset   • Migraines Mother    • Heart failure Mother    • Stroke Mother    • Stroke Maternal Grandmother    • Diabetes Maternal Grandmother    • Heart disease Father    • Cancer Sister    • Stroke Sister    • Breast cancer Sister 54   • Bone cancer Sister 54   • Diabetes Brother    • Leukemia Paternal Uncle        Review of Systems   Musculoskeletal: Positive for joint pain.   All other systems reviewed and are negative.     Objective:     Vitals:    04/30/18 1126   BP: 128/78   Pulse: 77   SpO2: 100%   Weight: 107 kg (235 lb 12.8 oz)   Height: 167.6 cm (65.98\")     Body mass index is 38.08 kg/m².    Physical Exam   Constitutional: She is oriented to person, place, and time. She appears well-developed and well-nourished.   HENT:   Head: Normocephalic and atraumatic.   Eyes: Conjunctivae are normal.   Neck: Neck supple.   Cardiovascular: Normal rate and regular rhythm.  Exam reveals no gallop and no friction rub.    No murmur heard.  Pulmonary/Chest: Effort normal and breath sounds normal.   Abdominal: Soft. There is no tenderness.   Musculoskeletal: She exhibits no edema.   Neurological: She is alert and oriented to person, place, and time.   Skin: Skin is warm.   Psychiatric: She has a normal mood and affect. Her behavior is normal.     Lab Review:   Procedures    Cardiac Procedures:  1.  Echocardiogram on 10/29/2017: Ejection fraction was 60-65%.  There was normal   diastolic function.  The right ventricle was mildly dilated. The saline study was moderately   positive suggestive of an atrial level shunt.    Assessment:       Diagnosis Plan   1. Cerebrovascular accident (CVA) due to thrombosis of left carotid artery     2. PFO (patent foramen " ovale)     3. Right ventricular enlargement       Plan:       Again, the patient will need another cerebral and carotid angiogram in the near future, and   she is going to schedule this with Dr. Day.  She is still on the aspirin and Brilinta for the   carotid artery procedure, and the duration of this will be per Dr. Day as well.  From a   cardiac standpoint, I still feel that a ADEEL is indicated at some point to further assess the   atrial shunt and right ventricular enlargement.  The right ventricle was only mildly enlarged,   although the bubble study was moderately positive.  This very likely represents a PFO,   although I do feel that further investigation is warranted.  Again, I am still waiting on her to   get the cerebral angiogram prior to doing the ADEEL.  If her right ventricle still appears to be   enlarged on the ADEEL, I would likely recommend a sleep study around that point depending   on the findings regarding the atrial shunt.  I will have her follow-up in 3 months, and if she   has had the cerebral angiogram, I will schedule the ADEEL around that time.

## 2018-05-14 ENCOUNTER — OFFICE VISIT (OUTPATIENT)
Dept: NEUROSURGERY | Facility: CLINIC | Age: 43
End: 2018-05-14

## 2018-05-14 VITALS
HEIGHT: 66 IN | BODY MASS INDEX: 37.12 KG/M2 | RESPIRATION RATE: 16 BRPM | SYSTOLIC BLOOD PRESSURE: 122 MMHG | HEART RATE: 88 BPM | DIASTOLIC BLOOD PRESSURE: 64 MMHG | WEIGHT: 231 LBS

## 2018-05-14 DIAGNOSIS — Z98.890 HX OF CEREBRAL ANEURYSM REPAIR: ICD-10-CM

## 2018-05-14 DIAGNOSIS — Z86.79 HX OF CEREBRAL ANEURYSM REPAIR: ICD-10-CM

## 2018-05-14 DIAGNOSIS — I67.1 INTRACRANIAL ANEURYSM: Primary | ICD-10-CM

## 2018-05-14 PROCEDURE — 99214 OFFICE O/P EST MOD 30 MIN: CPT | Performed by: NURSE PRACTITIONER

## 2018-05-14 RX ORDER — IBUPROFEN 200 MG
200 TABLET ORAL AS NEEDED
COMMUNITY

## 2018-05-14 RX ORDER — SODIUM CHLORIDE 9 MG/ML
100 INJECTION, SOLUTION INTRAVENOUS CONTINUOUS
Status: CANCELLED | OUTPATIENT
Start: 2018-06-11

## 2018-05-14 NOTE — PROGRESS NOTES
"Subjective   Patient ID: Mariaelena Mack is a 43 y.o. female is here today for follow-up of cerebral aneurysm prior to cerebral angiography. She is unaccompanied.    History of Present Illness     She returns to the office today for preoperative evaluation before undergoing cerebral angiogram.  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 Day on November 2, 2017.      Following stent placement she was not a Plavix responder and has been on Brilinta with full dose aspirin.  She has also been followed by cardiology for a PFO and is pending a ADEEL for further evaluation.  She has been followed by neurology for the left MCA stroke that was found after she presented to the emergency room on October 29 with right facial droop and dysarthria.    She is pending D&C for heavy menstrual bleeding once all the Brilinta.  She has continued to take iron injections as needed.  She denies headaches.  She also denies any visual changes, balance and gait issues sensory motor deficits and any other neuro change.  Overall she is feeling well but is very anxious regarding the upcoming angiogram and is hopeful for good findings.    She presents unaccompanied.    /64 (BP Location: Left arm, Patient Position: Sitting, Cuff Size: Adult)   Pulse 88   Resp 16   Ht 167.6 cm (65.98\")   Wt 105 kg (231 lb)   BMI 37.31 kg/m²     The following portions of the patient's history were reviewed and updated as appropriate: allergies, current medications, past family history, past medical history, past social history, past surgical history and problem list.    Review of Systems   Constitutional: Negative for chills and fever.   HENT: Negative for trouble swallowing.    Eyes: Negative for visual disturbance.   Respiratory: Negative for cough and shortness of breath.    Cardiovascular: Negative " for chest pain and palpitations.   Gastrointestinal: Negative for nausea and vomiting.   Musculoskeletal: Negative for gait problem and neck pain.   Skin: Negative for rash and wound.   Neurological: Positive for dizziness (intermittent). Negative for weakness and headaches.   Hematological: Bruises/bleeds easily.   Psychiatric/Behavioral: Negative for sleep disturbance. The patient is not nervous/anxious.        Objective   Physical Exam   Constitutional: She is oriented to person, place, and time. Vital signs are normal. She appears well-developed and well-nourished. She is cooperative.  Non-toxic appearance. She does not have a sickly appearance. She does not appear ill.   HENT:   Head: Normocephalic and atraumatic.   Eyes: EOM are normal.   Neck: Neck supple. Carotid bruit is not present.   Cardiovascular: Normal rate, regular rhythm and intact distal pulses.    Pulmonary/Chest: Effort normal and breath sounds normal. No respiratory distress. She has no wheezes.   Abdominal: Soft.   Musculoskeletal: Normal range of motion. She exhibits no tenderness.   Moves all extremities well  Strength is equal throughout   Neurological: She is alert and oriented to person, place, and time. She has normal strength. She displays no tremor. No cranial nerve deficit or sensory deficit. Coordination and gait normal. GCS eye subscore is 4. GCS verbal subscore is 5. GCS motor subscore is 6.   Cranial nerves II through XII grossly intact  Stable upright gait, normal station  Able to heel and toe walk, able to tandem   Skin: Skin is warm and dry.   Psychiatric: She has a normal mood and affect. Her behavior is normal. Judgment and thought content normal.   Vitals reviewed.    Neurologic Exam     Mental Status   Oriented to person, place, and time.     Cranial Nerves     CN III, IV, VI   Extraocular motions are normal.     Motor Exam     Strength   Strength 5/5 throughout.       Assessment/Plan   Independent Review of Radiographic  Studies:    No new imaging    Consulting provider notes reviewed    Medical Decision Making:    She returns the office today for preoperative evaluation before undergoing repeat cerebral angiogram for continued aneurysm surveillance status post embolization with Dr. Day last November.  Exam as noted above, no red flags.  Upon full clinical evaluation she is okay to proceed forward and will be scheduled for the upcoming angiogram following today's office visit.  Specifically, it will be evaluating questionable recannulization of the large, periophthalmic artery aneurysm.  She is doing very well and has no neuro deficits.  She continues on Brilinta and full dose aspirin.  Risks and benefits were explained and she is agreeable to proceed forward.  We will check P2 Y 12 in addition to normal preoperative lab work.  I've also added an EKG for history of questionable PFO, atrial shunt and right ventricular enlargement, per cardiology notes.    Plan: Cerebral angiogram to be scheduled following today's office visit; I will contact her with regards to continuation of full dose aspirin prior to the angiogram once I talked to Dr. Barb Armendarizquelyn was seen today for cerebral aneurysm.    Diagnoses and all orders for this visit:    Intracranial aneurysm  -     Case Request; Standing  -     CBC and Differential; Future  -     Comprehensive metabolic panel; Future  -     Protime-INR; Future  -     Urinalysis With / Culture If Indicated - Urine, Clean Catch  -     Sedimentation rate; Future  -     sodium chloride 0.9 % infusion; Infuse 100 mL/hr into a venous catheter Continuous.  -     TSH; Future  -     P2Y12 Platelet Inhibition; Future  -     ECG 12 Lead; Future  -     Case Request    Hx of cerebral aneurysm repair  -     Case Request; Standing  -     CBC and Differential; Future  -     Comprehensive metabolic panel; Future  -     Protime-INR; Future  -     Urinalysis With / Culture If Indicated - Urine, Clean Catch  -      Sedimentation rate; Future  -     sodium chloride 0.9 % infusion; Infuse 100 mL/hr into a venous catheter Continuous.  -     TSH; Future  -     P2Y12 Platelet Inhibition; Future  -     ECG 12 Lead; Future  -     Case Request    Other orders  -     Follow Anesthesia Guidelines / Standing Orders; Future  -     Obtain informed consent  -     Follow Anesthesia Guidelines / Standing Orders; Standing  -     Verify NPO Status; Standing  -     POC Glucose Once; Standing  -     Outpatient In A Bed; Standing      No Follow-up on file.

## 2018-05-15 ENCOUNTER — TELEPHONE (OUTPATIENT)
Dept: NEUROSURGERY | Facility: CLINIC | Age: 43
End: 2018-05-15

## 2018-05-15 NOTE — TELEPHONE ENCOUNTER
Left message for patient to let her know that it is ok to continue taking her Brilenta and aspirin up to her angiogram per Dr. Day

## 2018-05-19 ENCOUNTER — RESULTS ENCOUNTER (OUTPATIENT)
Dept: NEUROSURGERY | Facility: CLINIC | Age: 43
End: 2018-05-19

## 2018-05-19 DIAGNOSIS — Z98.890 HX OF CEREBRAL ANEURYSM REPAIR: ICD-10-CM

## 2018-05-19 DIAGNOSIS — Z86.79 HX OF CEREBRAL ANEURYSM REPAIR: ICD-10-CM

## 2018-05-19 DIAGNOSIS — I67.1 INTRACRANIAL ANEURYSM: ICD-10-CM

## 2018-06-06 ENCOUNTER — APPOINTMENT (OUTPATIENT)
Dept: PREADMISSION TESTING | Facility: HOSPITAL | Age: 43
End: 2018-06-06

## 2018-06-06 VITALS
HEART RATE: 80 BPM | DIASTOLIC BLOOD PRESSURE: 76 MMHG | RESPIRATION RATE: 20 BRPM | WEIGHT: 232.6 LBS | HEIGHT: 66 IN | SYSTOLIC BLOOD PRESSURE: 118 MMHG | OXYGEN SATURATION: 100 % | TEMPERATURE: 98.6 F | BODY MASS INDEX: 37.38 KG/M2

## 2018-06-06 DIAGNOSIS — Z86.79 HX OF CEREBRAL ANEURYSM REPAIR: ICD-10-CM

## 2018-06-06 DIAGNOSIS — Z98.890 HX OF CEREBRAL ANEURYSM REPAIR: ICD-10-CM

## 2018-06-06 DIAGNOSIS — I67.1 INTRACRANIAL ANEURYSM: ICD-10-CM

## 2018-06-06 LAB
ALBUMIN SERPL-MCNC: 4.1 G/DL (ref 3.5–5.2)
ALBUMIN/GLOB SERPL: 1.5 G/DL
ALP SERPL-CCNC: 46 U/L (ref 39–117)
ALT SERPL W P-5'-P-CCNC: 11 U/L (ref 1–33)
ANION GAP SERPL CALCULATED.3IONS-SCNC: 12.1 MMOL/L
AST SERPL-CCNC: 13 U/L (ref 1–32)
BACTERIA UR QL AUTO: ABNORMAL /HPF
BASOPHILS # BLD AUTO: 0.02 10*3/MM3 (ref 0–0.2)
BASOPHILS NFR BLD AUTO: 0.3 % (ref 0–1.5)
BILIRUB SERPL-MCNC: 0.5 MG/DL (ref 0.1–1.2)
BILIRUB UR QL STRIP: NEGATIVE
BUN BLD-MCNC: 8 MG/DL (ref 6–20)
BUN/CREAT SERPL: 11 (ref 7–25)
CALCIUM SPEC-SCNC: 9 MG/DL (ref 8.6–10.5)
CHLORIDE SERPL-SCNC: 105 MMOL/L (ref 98–107)
CLARITY UR: CLEAR
CO2 SERPL-SCNC: 22.9 MMOL/L (ref 22–29)
COLOR UR: YELLOW
CREAT BLD-MCNC: 0.73 MG/DL (ref 0.57–1)
DEPRECATED RDW RBC AUTO: 46.8 FL (ref 37–54)
EOSINOPHIL # BLD AUTO: 0.09 10*3/MM3 (ref 0–0.7)
EOSINOPHIL NFR BLD AUTO: 1.5 % (ref 0.3–6.2)
ERYTHROCYTE [DISTWIDTH] IN BLOOD BY AUTOMATED COUNT: 15 % (ref 11.7–13)
ERYTHROCYTE [SEDIMENTATION RATE] IN BLOOD: 4 MM/HR (ref 0–20)
GFR SERPL CREATININE-BSD FRML MDRD: 87 ML/MIN/1.73
GLOBULIN UR ELPH-MCNC: 2.7 GM/DL
GLUCOSE BLD-MCNC: 88 MG/DL (ref 65–99)
GLUCOSE UR STRIP-MCNC: NEGATIVE MG/DL
HCG SERPL QL: NEGATIVE
HCT VFR BLD AUTO: 31.7 % (ref 35.6–45.5)
HGB BLD-MCNC: 9.5 G/DL (ref 11.9–15.5)
HGB UR QL STRIP.AUTO: NEGATIVE
HYALINE CASTS UR QL AUTO: ABNORMAL /LPF
IMM GRANULOCYTES # BLD: 0.01 10*3/MM3 (ref 0–0.03)
IMM GRANULOCYTES NFR BLD: 0.2 % (ref 0–0.5)
INR PPP: 1.02 (ref 0.9–1.1)
KETONES UR QL STRIP: NEGATIVE
LEUKOCYTE ESTERASE UR QL STRIP.AUTO: ABNORMAL
LYMPHOCYTES # BLD AUTO: 1.93 10*3/MM3 (ref 0.9–4.8)
LYMPHOCYTES NFR BLD AUTO: 32.3 % (ref 19.6–45.3)
MCH RBC QN AUTO: 25.4 PG (ref 26.9–32)
MCHC RBC AUTO-ENTMCNC: 30 G/DL (ref 32.4–36.3)
MCV RBC AUTO: 84.8 FL (ref 80.5–98.2)
MONOCYTES # BLD AUTO: 0.54 10*3/MM3 (ref 0.2–1.2)
MONOCYTES NFR BLD AUTO: 9 % (ref 5–12)
NEUTROPHILS # BLD AUTO: 3.39 10*3/MM3 (ref 1.9–8.1)
NEUTROPHILS NFR BLD AUTO: 56.9 % (ref 42.7–76)
NITRITE UR QL STRIP: NEGATIVE
PA ADP PRP-ACNC: 5 PRU (ref 194–418)
PH UR STRIP.AUTO: 7 [PH] (ref 5–8)
PLATELET # BLD AUTO: 384 10*3/MM3 (ref 140–500)
PMV BLD AUTO: 11.6 FL (ref 6–12)
POTASSIUM BLD-SCNC: 3.7 MMOL/L (ref 3.5–5.2)
PROT SERPL-MCNC: 6.8 G/DL (ref 6–8.5)
PROT UR QL STRIP: NEGATIVE
PROTHROMBIN TIME: 13.2 SECONDS (ref 11.7–14.2)
RBC # BLD AUTO: 3.74 10*6/MM3 (ref 3.9–5.2)
RBC # UR: ABNORMAL /HPF
REF LAB TEST METHOD: ABNORMAL
SODIUM BLD-SCNC: 140 MMOL/L (ref 136–145)
SP GR UR STRIP: <1.005 (ref 1–1.03)
SQUAMOUS #/AREA URNS HPF: ABNORMAL /HPF
TSH SERPL DL<=0.05 MIU/L-ACNC: 2.83 MIU/ML (ref 0.27–4.2)
UROBILINOGEN UR QL STRIP: ABNORMAL
WBC NRBC COR # BLD: 5.97 10*3/MM3 (ref 4.5–10.7)
WBC UR QL AUTO: ABNORMAL /HPF

## 2018-06-06 PROCEDURE — 36415 COLL VENOUS BLD VENIPUNCTURE: CPT | Performed by: NURSE PRACTITIONER

## 2018-06-06 PROCEDURE — 85652 RBC SED RATE AUTOMATED: CPT | Performed by: NURSE PRACTITIONER

## 2018-06-06 PROCEDURE — 85576 BLOOD PLATELET AGGREGATION: CPT | Performed by: NURSE PRACTITIONER

## 2018-06-06 PROCEDURE — 93010 ELECTROCARDIOGRAM REPORT: CPT | Performed by: INTERNAL MEDICINE

## 2018-06-06 PROCEDURE — 93005 ELECTROCARDIOGRAM TRACING: CPT

## 2018-06-06 PROCEDURE — 81001 URINALYSIS AUTO W/SCOPE: CPT | Performed by: NURSE PRACTITIONER

## 2018-06-06 PROCEDURE — 85025 COMPLETE CBC W/AUTO DIFF WBC: CPT | Performed by: NURSE PRACTITIONER

## 2018-06-06 PROCEDURE — 84703 CHORIONIC GONADOTROPIN ASSAY: CPT | Performed by: NEUROLOGICAL SURGERY

## 2018-06-06 PROCEDURE — 80053 COMPREHEN METABOLIC PANEL: CPT | Performed by: NURSE PRACTITIONER

## 2018-06-06 PROCEDURE — 85610 PROTHROMBIN TIME: CPT | Performed by: NURSE PRACTITIONER

## 2018-06-06 PROCEDURE — 84443 ASSAY THYROID STIM HORMONE: CPT | Performed by: NURSE PRACTITIONER

## 2018-06-06 NOTE — DISCHARGE INSTRUCTIONS
Take the following medications the morning of surgery with a small sip of water: ASPIRIN AND BRILINTA.  STOP PREOPERATIVELY ANY ANTIINFLAMMATORY, HERBAL, VITAMIN, IBUPROFEN/MULTIVITAMIN.  ARRIVE TO THE MAIN SURGERY DESK THE DAY OF YOUR SURGERY BY 6AM.      General Instructions:  • Do not eat or drink anything after midnight the night before surgery.  • Infants may have breast milk up to four hours before surgery.  • Infants drinking formula may drink formula up to six hours before surgery.   • Patients who avoid smoking, chewing tobacco and alcohol for 4 weeks prior to surgery have a reduced risk of post-operative complications.  Quit smoking as many days before surgery as you can.  • Do not smoke, use chewing tobacco or drink alcohol the day of surgery.   • If applicable bring your C-PAP/ BI-PAP machine.  • Bring any papers given to you in the doctor’s office.  • Wear clean comfortable clothes and socks.  • Do not wear contact lenses or make-up.  Bring a case for your glasses.   • Bring crutches or walker if applicable.  • Remove all piercings.  Leave jewelry and any other valuables at home.  • Hair extensions with metal clips must be removed prior to surgery.  • The Pre-Admission Testing nurse will instruct you to bring medications if unable to obtain an accurate list in Pre-Admission Testing.        If you were given a blood bank ID arm band remember to bring it with you the day of surgery.    Preventing a Surgical Site Infection:  • For 2 to 3 days before surgery, avoid shaving with a razor because the razor can irritate skin and make it easier to develop an infection.  • The night prior to surgery sleep in a clean bed with clean clothing.  Do not allow pets to sleep with you.  • Shower on the morning of surgery using a fresh bar of anti-bacterial soap (such as Dial) and clean washcloth.  Dry with a clean towel and dress in clean clothing.  • Ask your surgeon if you will be receiving antibiotics prior to  surgery.  • Make sure you, your family, and all healthcare providers clean their hands with soap and water or an alcohol based hand  before caring for you or your wound.    Day of surgery:  Upon arrival, a Pre-op nurse and Anesthesiologist will review your health history, obtain vital signs, and answer questions you may have.  The only belongings needed at this time will be your home medications and if applicable your C-PAP/BI-PAP machine.  If you are staying overnight your family can leave the rest of your belongings in the car and bring them to your room later.  A Pre-op nurse will start an IV and you may receive medication in preparation for surgery, including something to help you relax.  Your family will be able to see you in the Pre-op area.  While you are in surgery your family should notify the waiting room  if they leave the waiting room area and provide a contact phone number.    Please be aware that surgery does come with discomfort.  We want to make every effort to control your discomfort so please discuss any uncontrolled symptoms with your nurse.   Your doctor will most likely have prescribed pain medications.      If you are going home after surgery you will receive individualized written care instructions before being discharged.  A responsible adult must drive you to and from the hospital on the day of your surgery and stay with you for 24 hours.    If you are staying overnight following surgery, you will be transported to your hospital room following the recovery period.  [facility] has all private rooms.    If you have any questions please call Pre-Admission Testing at [phone].  Deductibles and co-payments are collected on the day of service. Please be prepared to pay the required co-pay, deductible or deposit on the day of service as defined by your plan.

## 2018-06-11 ENCOUNTER — APPOINTMENT (OUTPATIENT)
Dept: GENERAL RADIOLOGY | Facility: HOSPITAL | Age: 43
End: 2018-06-11

## 2018-06-11 ENCOUNTER — HOSPITAL ENCOUNTER (OUTPATIENT)
Facility: HOSPITAL | Age: 43
Discharge: HOME | End: 2018-06-11
Attending: NEUROLOGICAL SURGERY | Admitting: NEUROLOGICAL SURGERY

## 2018-06-11 VITALS
TEMPERATURE: 99.2 F | HEIGHT: 66 IN | DIASTOLIC BLOOD PRESSURE: 54 MMHG | HEART RATE: 75 BPM | SYSTOLIC BLOOD PRESSURE: 94 MMHG | WEIGHT: 231.26 LBS | RESPIRATION RATE: 16 BRPM | OXYGEN SATURATION: 100 % | BODY MASS INDEX: 37.17 KG/M2

## 2018-06-11 DIAGNOSIS — Z86.79 HX OF CEREBRAL ANEURYSM REPAIR: ICD-10-CM

## 2018-06-11 DIAGNOSIS — Z98.890 HX OF CEREBRAL ANEURYSM REPAIR: ICD-10-CM

## 2018-06-11 DIAGNOSIS — I67.1 INTRACRANIAL ANEURYSM: ICD-10-CM

## 2018-06-11 PROCEDURE — 0 IOPAMIDOL PER 1 ML: Performed by: NEUROLOGICAL SURGERY

## 2018-06-11 PROCEDURE — C1760 CLOSURE DEV, VASC: HCPCS | Performed by: NEUROLOGICAL SURGERY

## 2018-06-11 PROCEDURE — C1894 INTRO/SHEATH, NON-LASER: HCPCS | Performed by: NEUROLOGICAL SURGERY

## 2018-06-11 PROCEDURE — 25010000002 FENTANYL CITRATE (PF) 100 MCG/2ML SOLUTION

## 2018-06-11 PROCEDURE — C1769 GUIDE WIRE: HCPCS | Performed by: NEUROLOGICAL SURGERY

## 2018-06-11 PROCEDURE — 76377 3D RENDER W/INTRP POSTPROCES: CPT | Performed by: NEUROLOGICAL SURGERY

## 2018-06-11 PROCEDURE — 25010000002 HEPARIN (PORCINE) PER 1000 UNITS: Performed by: NEUROLOGICAL SURGERY

## 2018-06-11 PROCEDURE — 25010000002 MIDAZOLAM PER 1 MG

## 2018-06-11 PROCEDURE — 36224 PLACE CATH CAROTD ART: CPT | Performed by: NEUROLOGICAL SURGERY

## 2018-06-11 PROCEDURE — 76377 3D RENDER W/INTRP POSTPROCES: CPT

## 2018-06-11 RX ORDER — HEPARIN SODIUM 1000 [USP'U]/ML
INJECTION, SOLUTION INTRAVENOUS; SUBCUTANEOUS AS NEEDED
Status: DISCONTINUED | OUTPATIENT
Start: 2018-06-11 | End: 2018-06-11 | Stop reason: HOSPADM

## 2018-06-11 RX ORDER — SODIUM CHLORIDE 9 MG/ML
100 INJECTION, SOLUTION INTRAVENOUS CONTINUOUS
Status: DISCONTINUED | OUTPATIENT
Start: 2018-06-11 | End: 2018-06-11 | Stop reason: HOSPADM

## 2018-06-11 RX ORDER — MIDAZOLAM HYDROCHLORIDE 1 MG/ML
INJECTION INTRAMUSCULAR; INTRAVENOUS
Status: COMPLETED
Start: 2018-06-11 | End: 2018-06-11

## 2018-06-11 RX ORDER — HYDROCODONE BITARTRATE AND ACETAMINOPHEN 5; 325 MG/1; MG/1
1 TABLET ORAL ONCE AS NEEDED
Status: COMPLETED | OUTPATIENT
Start: 2018-06-11 | End: 2018-06-11

## 2018-06-11 RX ORDER — FENTANYL CITRATE 50 UG/ML
INJECTION, SOLUTION INTRAMUSCULAR; INTRAVENOUS
Status: COMPLETED
Start: 2018-06-11 | End: 2018-06-11

## 2018-06-11 RX ADMIN — IOPAMIDOL 55.6 ML: 510 INJECTION, SOLUTION INTRAVASCULAR at 09:15

## 2018-06-11 RX ADMIN — FENTANYL CITRATE 25 MCG: 50 INJECTION INTRAMUSCULAR; INTRAVENOUS at 07:56

## 2018-06-11 RX ADMIN — SODIUM CHLORIDE 100 ML/HR: 9 INJECTION, SOLUTION INTRAVENOUS at 06:56

## 2018-06-11 RX ADMIN — HYDROCODONE BITARTRATE AND ACETAMINOPHEN 1 TABLET: 5; 325 TABLET ORAL at 09:30

## 2018-06-11 RX ADMIN — MIDAZOLAM 2 MG: 1 INJECTION INTRAMUSCULAR; INTRAVENOUS at 07:52

## 2018-06-11 NOTE — DISCHARGE INSTRUCTIONS
Roger Day MD  [facility]    [address]  [address]  [place] [address]   P: [phone]    SEDATION DISCHARGE INSTRUCTIONS.  IMPORTANT: The following information will help you return to your best level of health.  Sedation.  You have had a procedure that called for some medicine to reduce anxiety and pain. This medicine (or medicines) is called sedation. After receiving the medicine, you may be sleepy, but able to breathe on your own. The effects of the medicine may last for several hours.  Follow these instructions after sedation:  Go right home. Rest quietly at home today, then you can be up and about.  Do not drink alcohol, drive or operate machinery for 24 hours.  Do not do anything where light-headedness or clumsiness would be dangerous.  Do not make important decisions or sign any legal papers for the next 24 hours.  Make sure A RESPONSIBLE PERSON stays with you the rest of today and overnight for your protection and safety.  Start your diet with fluids and light foods (jello, soup, juice, toast). Then, slowly progress to your usual diet if you are not sick to your stomach.  Call your doctor if you have:  a gray or blue skin color.  excess sleepiness.  repeated vomiting.  trouble breathing.  any new problems or concerns.    POSTOPERATIVE CARE INSTRUCTIONS FOR CEREBRAL ANGIOGRAPHY    1. After your angiogram, you will need to be less active than normal. you should plan to be off work and relax for the next two days. This is because Dr. Day has placed a plug in your artery to close it up, but it still needs some time to heal.    2. You may feel some stinging and see some slight swelling. You may also see bruising; possibly a large amount of bruising. This is normal. An ice pack will help relieve the stinging and swelling. Use the pack at your puncture site for about 20 minutes; then remove it for at least 30 minutes. You may repeat this as often as you  need.    3. Because Dr. Day injected some dye into your artery, you will need to drink a lot of fluids over the next 2 days in order to flush it out of your body. Water and juice are the best choices. If you need to drink soda, coffee or tea, choose decaffeinated. Caffeine will dehydrate you, and it will take you longer to flush out the dye from your body. You will know you are getting enough fluids if your urine is clear or very pale yellow.    4. Avoid strenuous activity and heavy lifting intil you return to the office. Heavy lifting is considered anything over 10 pounds. A gallon of milk weighs 8 pounds. If you have to hold anything more than 10 pounds (such as a baby), have someone place that object in your lap or arms.    5. Check your dressing occasionally. You may remove it 48 hours after your procedure. If you notice that you are bleeding or your dressing becomes soaked with fresh blood, call 911 and have them take you to the emergency room.   Hold pressure over the site. Call our office immediately ([phone]) if you notice the following: any bright redness at the puncture site, coldness in the leg, periods of the chills, red streaks running up your abdomen or down your leg, a fever over 101 degrees F orally, green or yellow discharge or discharge that has a foul odor. If it is after office hours, your call will be forwarded to the neurosurgeon on-call.    6. If you have any questions or concerns at any time, please feel free to call the office at ([phone]).Dr. Day or neurosurgeon on-call is available 24-hours a day. Our staff is here to help you in any way we can.

## 2018-06-11 NOTE — OP NOTE
SEVEN MONTHS POST PIPELINE EMBOLIZATION DEVICE AND COILS    Procedure Note  Cerebral Angiogram    Mariaelena Mack  6/11/2018  [id number]    SURGEON  Roger Day MD    ASSISTANT:  Marni Davis CFA    INDICATION:    Seven month follow up of previously embolized  Left carotid large periophthalmic artery cerebral aneurysm    Pre-op Diagnosis:   Pre-Op Diagnosis Codes:     * Intracranial aneurysm [I67.1]     * Hx of cerebral aneurysm repair [Z98.890, Z86.79]    Cerebal aneurysm    Post-op Diagnosis:     Post-Op Diagnosis Codes:     * Intracranial aneurysm [I67.1]     * Hx of cerebral aneurysm repair [Z98.890, Z86.79]  Cerebral aneurysm    PROCEDURE PERFORMED:  Procedure(s):  CEREBRAL ANGIOGRAM    1. Selective catheterization and left internal carotid including rotational angiography and 3D reconstruction on a separate workstation.    Anesthesia: Local    Staff:   Circulator: Tiffany Roberts RN  Scrub Person: Letty Jarrell  Monitor/Sedation Nurse: Cat Santoro RN  Assistant: Marni Davis CSA  Vascular Radiology Technician: Xu He    Estimated Blood Loss: minimal    Findings: 100% occlusion of cerebral aneurysm with no residual filling    Complications: none    PROCEDURE IN DETAIL: The patient was brought to the operating room where she was hooked up to EKG, oxygen saturation, and blood pressure monitoring and placed on the biplane angiography table. Continuous monitoring was accomplished during the operative intervention. She was given intravenous sedation and the groin was prepped in the usual sterile manner.    Local anesthesia was given. Singlewall puncture technique 5 Iraqi introducing sheath under ultrasound guidance. 5 Iraqi Art catheter. Attempted on right side but much scar tissue so cath on the left femoral artery.    Under fluoroscopic guidance selective catheterization of the left internal carotid artery and left internal carotid artery cerebral angiography in multiple  projections including rotational angiography with 3D reconstructions on a separate workstation.    This demonstrates the normal branching pattern of the internal carotid artery. There is no evidence of arterio-venous malformation, arterio-venous fistula or venous anomaly. The previously placed coils are in good position. The Pipeline embolization device and endo-aneurysmal coils are in good position. The coils have collapsed liklly corresponding to the aneurysm shrinkage. There is no in-device stenosis. The aneurysm does not fill at all.      There is evidence of 100% occlusion of the previously embolized cerebral aneurysm.    Arteriography through the existing sheath demonstrates the sheath to be within the femoral artery. Minyx system was used to close the artery, pressure was held for the appropriate length of time and the patient was transferred to the recovery room in stable and good condition.    Roger Day MD     Date: 6/11/2018  Time: 9:38 AM

## 2018-06-12 ENCOUNTER — TELEPHONE (OUTPATIENT)
Dept: CARDIOLOGY | Facility: CLINIC | Age: 43
End: 2018-06-12

## 2018-06-25 ENCOUNTER — OFFICE VISIT (OUTPATIENT)
Dept: NEUROSURGERY | Facility: CLINIC | Age: 43
End: 2018-06-25

## 2018-06-25 VITALS
HEART RATE: 69 BPM | RESPIRATION RATE: 18 BRPM | SYSTOLIC BLOOD PRESSURE: 102 MMHG | HEIGHT: 66 IN | DIASTOLIC BLOOD PRESSURE: 70 MMHG | BODY MASS INDEX: 37.45 KG/M2 | WEIGHT: 233 LBS

## 2018-06-25 DIAGNOSIS — Z86.79 HX OF CEREBRAL ANEURYSM REPAIR: ICD-10-CM

## 2018-06-25 DIAGNOSIS — I67.1 INTRACRANIAL ANEURYSM: Primary | ICD-10-CM

## 2018-06-25 DIAGNOSIS — Z98.890 HX OF CEREBRAL ANEURYSM REPAIR: ICD-10-CM

## 2018-06-25 PROCEDURE — 99213 OFFICE O/P EST LOW 20 MIN: CPT | Performed by: NURSE PRACTITIONER

## 2018-06-25 NOTE — PROGRESS NOTES
" HPI:   Mariaelena Mack is a 43 y.o. female for follow-up with history of a large left-sided carotid artery aneurysm.  She is status post recent cerebral angiogram for continued surveillance after undergoing embolization and placement of the pipeline stent on November 2, 2017.  Brilinta was discontinued following the angiogram.    She is doing very well following the procedure and denies any issues with the right groin site.  She also denies headaches or any other problems.  She is very happy to be off the Brilinta secondary to multiple bruises.  She is pending outpatient ADEEL and endometrial ablation now that she is off the medication.    She presents unaccompanied.    Review of Systems   Eyes: Negative for visual disturbance.   Cardiovascular: Negative for leg swelling.   Skin: Negative for color change (or coolness of procedure leg), pallor and wound (swelling, excess bruising or bleeding of groin site ).   Neurological: Negative for facial asymmetry, speech difficulty, weakness, numbness (of procedure leg) and headaches.        /70 (BP Location: Right arm, Patient Position: Sitting)   Pulse 69   Resp 18   Ht 167.6 cm (66\")   Wt 106 kg (233 lb)   BMI 37.61 kg/m²     Physical Exam   Constitutional: She is oriented to person, place, and time. Vital signs are normal. She appears well-developed and well-nourished. She is cooperative.   Very pleasant well appearing middle-aged female   HENT:   Head: Normocephalic and atraumatic.   Eyes: EOM are normal.   Neck: Neck supple. No tracheal deviation present.   Cardiovascular: Intact distal pulses.    Pulmonary/Chest: Effort normal and breath sounds normal. No respiratory distress.   Abdominal: Soft.   Musculoskeletal: Normal range of motion. She exhibits no tenderness.   Neurological: She is alert and oriented to person, place, and time. She displays no tremor and normal reflexes. No cranial nerve deficit. She exhibits normal muscle tone. Coordination and gait " normal. GCS eye subscore is 4. GCS verbal subscore is 5. GCS motor subscore is 6.   Gait is stable and upright  Cranial nerves II through XII grossly intact   Skin: Skin is warm and dry.   Psychiatric: She has a normal mood and affect. Her behavior is normal. Thought content normal.   Vitals reviewed.    Neurologic Exam     Mental Status   Oriented to person, place, and time.     Cranial Nerves     CN III, IV, VI   Extraocular motions are normal.       Findings/Results:  Operative findings revealed 100% occlusion of the large left carotid. periophthalmic artery aneurysm, as reviewed and discussed with Dr. Day    Assessment/Plan:  Mariaelena was seen today for post-op.    Diagnoses and all orders for this visit:    Intracranial aneurysm  -     CT Angiogram Head With & Without Contrast; Future    Hx of cerebral aneurysm repair  -     CT Angiogram Head With & Without Contrast; Future        Discussion/Summary  She returns to the office today for follow-up status post cerebral angiogram for continued surveillance of previously embolized aneurysm.  Exam as noted above, no red flags.  She is doing very well in operative findings revealed 100% obliteration.  She is off the Brilinta given the stable findings.  From our standpoint we'll plan on seeing her back in 3 years with CTA head imaging.  She is okay to move forward with any additional outpatient testing or procedures.  She is to call the office at anytime with any questions or concerns.    Plan: Return to office in 3 years with CTA head for continued aneurysm surveillance  Plan: Return in about 3 years (around 6/25/2021).         Patient Care Team    Patient Care Team:  India Stubbs MD as PCP - General (Family Medicine)  Soraida Jay MD as Consulting Physician (Obstetrics and Gynecology)  India Stubbs MD as Referring Physician (Family Medicine)  Sandy Alicea MD as Consulting Physician (Hematology and Oncology)  William Patino MD as Consulting  Physician (Cardiology)    Celina Liriano, APRN  6/25/2018    Dragon disclaimer:   Much of this encounter note is an electronic transcription/translation of spoken language to printed text. The electronic translation of spoken language may permit erroneous, or at times, nonsensical words or phrases to be inadvertently transcribed; Although I have reviewed the note for such errors, some may still exist.

## 2018-07-03 ENCOUNTER — TELEPHONE (OUTPATIENT)
Dept: NEUROSURGERY | Facility: CLINIC | Age: 43
End: 2018-07-03

## 2018-07-03 NOTE — TELEPHONE ENCOUNTER
"Per ALEKSANDAR, yes, her children/siblings should be checked. Patient was already aware of this. She reports that Dr. Day recommended her son be checked when \"he is around 18\"  "

## 2018-07-03 NOTE — TELEPHONE ENCOUNTER
----- Message from Gary Rico sent at 7/2/2018  1:05 PM EDT -----  Contact: Jaelyn DIGGS/SACHA [phone] ext [phone]  Jaelyn Jones called to ask if any of her first degree relatives need to be scanned for aneurysm due to the patients age.

## 2018-08-08 ENCOUNTER — OFFICE VISIT (OUTPATIENT)
Dept: CARDIOLOGY | Facility: CLINIC | Age: 43
End: 2018-08-08

## 2018-08-08 VITALS
SYSTOLIC BLOOD PRESSURE: 112 MMHG | HEIGHT: 66 IN | HEART RATE: 88 BPM | BODY MASS INDEX: 38.25 KG/M2 | WEIGHT: 238 LBS | DIASTOLIC BLOOD PRESSURE: 80 MMHG | OXYGEN SATURATION: 98 %

## 2018-08-08 DIAGNOSIS — I51.7 RIGHT VENTRICULAR ENLARGEMENT: ICD-10-CM

## 2018-08-08 DIAGNOSIS — I63.032 CEREBROVASCULAR ACCIDENT (CVA) DUE TO THROMBOSIS OF LEFT CAROTID ARTERY (HCC): ICD-10-CM

## 2018-08-08 DIAGNOSIS — Q21.12 PFO (PATENT FORAMEN OVALE): Primary | ICD-10-CM

## 2018-08-08 PROCEDURE — 99214 OFFICE O/P EST MOD 30 MIN: CPT | Performed by: INTERNAL MEDICINE

## 2018-08-11 NOTE — PROGRESS NOTES
Date of Office Visit: 2018  Encounter Provider: William Patino MD  Place of Service: [facility] CARDIOLOGY  Patient Name: Mariaelena Mack  :1975    Chief complaint: Follow-up for left ICA aneurysm with partial thrombosis, left MCA   CVA, atrial shunt, RV enlargement.     History of Present Illness:    I again had the pleasure of seeing the patient in cardiology office on 2018.  She is a   very pleasant 43 year-old white female with a past medical history significant for a left   internal carotid artery aneurysm, left MCA CVA, and atrial shunt who presents for follow-up.    I initially saw the patient in the hospital on 10/30/2017.  She had been admitted with   headache, speech impairment, and right-sided facial weakness and numbness.  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  She   also had a left MCA distribution CVA with a nonocclusive proximal thrombus, from the left   internal carotid artery aneurysm.  She ultimately underwent a cerebral angiogram and   embolization of the aneurysm with a Pipeline embolization device and coils on 2017   by Dr. Day.  While hospitalized, she did have an echocardiogram performed on   10/29/2017 which showed an ejection fraction of 60-65%, normal diastolic function, mildly   enlarged right ventricle, and a moderately positive saline study suggestive of an atrial level   shunt.     The patient presents today for follow-up.  She did have her carotid and cerebral angiogram   in 2018.  I do not see the report, although the patient tells me that this was good. She   has stopped the Brilinta at this point.  She has not had any significant cardiac symptoms,  including shortness of breath or chest pain.    Past Medical History:   Diagnosis Date   • Anxiety     WITH MEDICAL HX, NO MEDS   • Bulging of thoracic intervertebral disc     over 20 yrs ago   • Cerebral  aneurysm    • Headache, tension-type    • History of anemia     IRON INFUSIONS 2/2018   • History of transfusion    • Internal carotid aneurysm     Left ICA aneurysm with partial thrombosis 10/29/17 - resulting in left MCA CVA.   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   • Migraine    • PFO (patent foramen ovale)    • Sciatica of left side    • Stroke (CMS/HCC)    • Tattoos    • Uterine fibroid        Past Surgical History:   Procedure Laterality Date   • CEREBRAL ANGIOGRAM N/A 6/11/2018    Procedure: CEREBRAL ANGIOGRAM;  Surgeon: Roger Day MD;  Location: [facility] 18/19;  Service: Neurosurgery   • EMBOLIZATION CEREBRAL N/A 11/2/2017    Procedure: Cerebral angiogram and embolization of cerebral angiogram with Pipeline Embolization Device and coils.;  Surgeon: Roger Day MD;  Location: [facility] OR 18/19;  Service:        Current Outpatient Prescriptions on File Prior to Visit   Medication Sig Dispense Refill   • aspirin  MG EC tablet Take 1 tablet by mouth Daily. (Patient taking differently: Take 325 mg by mouth Daily. PT TO KEEP TAKING, EVEN DAY OF PER PT) 30 tablet 11   • atorvastatin (LIPITOR) 40 MG tablet Take 1 tablet by mouth Every Night. 30 tablet 1   • cetirizine (zyrTEC) 10 MG tablet Take 10 mg by mouth Daily As Needed for Allergies.     • ibuprofen (ADVIL,MOTRIN) 200 MG tablet Take 200 mg by mouth As Needed. STOPPED PREOP, LAST DOSE 6/3/18     • Multiple Vitamin (MULTI-VITAMIN DAILY PO) Take 1 tablet by mouth Daily. STOPPED PREOP, LAST DOSE 6/5/18     • vitamin B-12 (CYANOCOBALAMIN) 1000 MCG tablet Take 1,000 mcg by mouth Daily.       No current facility-administered medications on file prior to visit.      Allergies as of 08/08/2018   • (No Known Allergies)     Social History     Social History   • Marital status:      Spouse name: Silvestre   • Number of children: 1   • Years of education: College     Occupational  "History   •  [employer]     full-time     Social History Main Topics   • Smoking status: Former Smoker     Packs/day: 1.00     Years: 21.00     Types: Electronic Cigarette, Cigarettes     Quit date: 2015   • Smokeless tobacco: Never Used      Comment: uses Vaporizor   • Alcohol use 12.0 oz/week     20 Cans of beer per week      Comment: weekends   • Drug use: No   • Sexual activity: Yes     Partners: Male     Birth control/ protection: None     Other Topics Concern   • Not on file     Social History Narrative    Patient lives with  and socially drinks fairly heavily most weekends. States 6-8 beers on weekend nights. Less on weekdays.     Family History   Problem Relation Age of Onset   • Migraines Mother    • Heart failure Mother    • Stroke Mother    • Stroke Maternal Grandmother    • Diabetes Maternal Grandmother    • Heart disease Father    • Cancer Sister    • Stroke Sister    • Breast cancer Sister 54   • Bone cancer Sister 54   • Diabetes Brother    • Leukemia Paternal Uncle    • Malig Hyperthermia Neg Hx        Review of Systems   Constitution: Positive for malaise/fatigue.   Cardiovascular: Positive for leg swelling and palpitations.   All other systems reviewed and are negative.     Objective:     Vitals:    08/08/18 1424   BP: 112/80   BP Location: Right arm   Patient Position: Sitting   Pulse: 88   SpO2: 98%   Weight: 108 kg (238 lb)   Height: 167.6 cm (66\")     Body mass index is 38.41 kg/m².    Physical Exam   Constitutional: She is oriented to person, place, and time. She appears well-developed and well-nourished.   HENT:   Head: Normocephalic and atraumatic.   Eyes: Conjunctivae are normal.   Neck: Neck supple.   Cardiovascular: Normal rate and regular rhythm.  Exam reveals no gallop and no friction rub.    No murmur heard.  Pulmonary/Chest: Effort normal and breath sounds normal.   Abdominal: Soft. There is no tenderness.   Musculoskeletal: She exhibits no " edema.   Neurological: She is alert and oriented to person, place, and time.   Skin: Skin is warm.   Psychiatric: She has a normal mood and affect. Her behavior is normal.     Lab Review:   Procedures    Cardiac Procedures:  1.  Echocardiogram on 10/29/2017: Ejection fraction was 60-65%.  There was normal   diastolic function.  The right ventricle was mildly dilated. The saline study was   moderately positive suggestive of an atrial level shunt.    Assessment:       Diagnosis Plan   1. PFO (patent foramen ovale)  Adult Transesophageal Echo (ADEEL) W/ Cont if Necessary Per Protocol   2. Right ventricular enlargement  Adult Transesophageal Echo (ADEEL) W/ Cont if Necessary Per Protocol   3. Cerebrovascular accident (CVA) due to thrombosis of left carotid artery (CMS/HCC)       Plan:       The patient seems to be stable at this point.  I did tell her that she could decrease the   aspirin from 325 mg per day to 81 mg per day.  She will remain on the Lipitor at 40 mg   per day.  At this point, she has had her repeat carotid and cerebral angiogram, and   has essentially been released from neurosurgery.  I feel that it is appropriate to   proceed with the ADEEL.  The right ventricle was mildly enlarged, although there was a   moderately positive bubble study on her transthoracic echo.  I suspect that this is a   significant PFO, although I want to make sure that it is not an ASD with a right   ventricular enlargement.  I will also reevaluate the size of the right ventricle on the ADEEL.    If it is truly enlarged, I would likely recommend a sleep study around that point.  I will   have her follow-up with me in 6 months, and the ADEEL will be scheduled.

## 2018-09-13 ENCOUNTER — TRANSCRIBE ORDERS (OUTPATIENT)
Dept: CARDIOLOGY | Facility: CLINIC | Age: 43
End: 2018-09-13

## 2018-09-13 DIAGNOSIS — Z13.6 SCREENING FOR ISCHEMIC HEART DISEASE: ICD-10-CM

## 2018-09-13 DIAGNOSIS — Z01.810 PRE-OPERATIVE CARDIOVASCULAR EXAMINATION: Primary | ICD-10-CM

## 2018-10-01 ENCOUNTER — LAB (OUTPATIENT)
Dept: LAB | Facility: HOSPITAL | Age: 43
End: 2018-10-01

## 2018-10-01 DIAGNOSIS — Z01.810 PRE-OPERATIVE CARDIOVASCULAR EXAMINATION: ICD-10-CM

## 2018-10-01 DIAGNOSIS — Z13.6 SCREENING FOR ISCHEMIC HEART DISEASE: ICD-10-CM

## 2018-10-01 LAB
ANION GAP SERPL CALCULATED.3IONS-SCNC: 13.4 MMOL/L
BASOPHILS # BLD AUTO: 0.03 10*3/MM3 (ref 0–0.2)
BASOPHILS NFR BLD AUTO: 0.5 % (ref 0–1.5)
BUN BLD-MCNC: 8 MG/DL (ref 6–20)
BUN/CREAT SERPL: 11.4 (ref 7–25)
CALCIUM SPEC-SCNC: 9.1 MG/DL (ref 8.6–10.5)
CHLORIDE SERPL-SCNC: 105 MMOL/L (ref 98–107)
CO2 SERPL-SCNC: 22.6 MMOL/L (ref 22–29)
CREAT BLD-MCNC: 0.7 MG/DL (ref 0.57–1)
DEPRECATED RDW RBC AUTO: 52.8 FL (ref 37–54)
EOSINOPHIL # BLD AUTO: 0.04 10*3/MM3 (ref 0–0.7)
EOSINOPHIL NFR BLD AUTO: 0.7 % (ref 0.3–6.2)
ERYTHROCYTE [DISTWIDTH] IN BLOOD BY AUTOMATED COUNT: 18.9 % (ref 11.7–13)
GFR SERPL CREATININE-BSD FRML MDRD: 91 ML/MIN/1.73
GLUCOSE BLD-MCNC: 103 MG/DL (ref 65–99)
HCT VFR BLD AUTO: 31.2 % (ref 35.6–45.5)
HGB BLD-MCNC: 8.8 G/DL (ref 11.9–15.5)
IMM GRANULOCYTES # BLD: 0.01 10*3/MM3 (ref 0–0.03)
IMM GRANULOCYTES NFR BLD: 0.2 % (ref 0–0.5)
LYMPHOCYTES # BLD AUTO: 1.64 10*3/MM3 (ref 0.9–4.8)
LYMPHOCYTES NFR BLD AUTO: 27.2 % (ref 19.6–45.3)
MCH RBC QN AUTO: 21.3 PG (ref 26.9–32)
MCHC RBC AUTO-ENTMCNC: 28.2 G/DL (ref 32.4–36.3)
MCV RBC AUTO: 75.5 FL (ref 80.5–98.2)
MONOCYTES # BLD AUTO: 0.33 10*3/MM3 (ref 0.2–1.2)
MONOCYTES NFR BLD AUTO: 5.5 % (ref 5–12)
NEUTROPHILS # BLD AUTO: 3.98 10*3/MM3 (ref 1.9–8.1)
NEUTROPHILS NFR BLD AUTO: 66.1 % (ref 42.7–76)
PLATELET # BLD AUTO: 504 10*3/MM3 (ref 140–500)
PMV BLD AUTO: 11.1 FL (ref 6–12)
POTASSIUM BLD-SCNC: 3.8 MMOL/L (ref 3.5–5.2)
RBC # BLD AUTO: 4.13 10*6/MM3 (ref 3.9–5.2)
SODIUM BLD-SCNC: 141 MMOL/L (ref 136–145)
WBC NRBC COR # BLD: 6.02 10*3/MM3 (ref 4.5–10.7)

## 2018-10-01 PROCEDURE — 36415 COLL VENOUS BLD VENIPUNCTURE: CPT

## 2018-10-01 PROCEDURE — 85025 COMPLETE CBC W/AUTO DIFF WBC: CPT

## 2018-10-01 PROCEDURE — 80048 BASIC METABOLIC PNL TOTAL CA: CPT

## 2018-10-05 ENCOUNTER — HOSPITAL ENCOUNTER (OUTPATIENT)
Dept: CARDIOLOGY | Facility: HOSPITAL | Age: 43
Discharge: HOME | End: 2018-10-05

## 2018-10-05 ENCOUNTER — HOSPITAL ENCOUNTER (OUTPATIENT)
Dept: CARDIOLOGY | Facility: HOSPITAL | Age: 43
Discharge: HOME | End: 2018-10-05
Attending: INTERNAL MEDICINE | Admitting: INTERNAL MEDICINE

## 2018-10-05 VITALS
SYSTOLIC BLOOD PRESSURE: 101 MMHG | HEART RATE: 74 BPM | DIASTOLIC BLOOD PRESSURE: 65 MMHG | OXYGEN SATURATION: 96 % | RESPIRATION RATE: 11 BRPM

## 2018-10-05 VITALS
OXYGEN SATURATION: 96 % | RESPIRATION RATE: 16 BRPM | HEART RATE: 74 BPM | DIASTOLIC BLOOD PRESSURE: 70 MMHG | SYSTOLIC BLOOD PRESSURE: 103 MMHG

## 2018-10-05 DIAGNOSIS — I51.7 RIGHT VENTRICULAR ENLARGEMENT: ICD-10-CM

## 2018-10-05 DIAGNOSIS — Q21.12 PFO (PATENT FORAMEN OVALE): ICD-10-CM

## 2018-10-05 LAB
BH CV ECHO MEAS - RAP SYSTOLE: 3 MMHG
BH CV ECHO MEAS - RVSP: 26 MMHG

## 2018-10-05 PROCEDURE — 93321 DOPPLER ECHO F-UP/LMTD STD: CPT | Performed by: INTERNAL MEDICINE

## 2018-10-05 PROCEDURE — 93325 DOPPLER ECHO COLOR FLOW MAPG: CPT

## 2018-10-05 PROCEDURE — 99153 MOD SED SAME PHYS/QHP EA: CPT

## 2018-10-05 PROCEDURE — 93312 ECHO TRANSESOPHAGEAL: CPT

## 2018-10-05 PROCEDURE — 93312 ECHO TRANSESOPHAGEAL: CPT | Performed by: INTERNAL MEDICINE

## 2018-10-05 PROCEDURE — 25010000002 FENTANYL CITRATE (PF) 100 MCG/2ML SOLUTION: Performed by: INTERNAL MEDICINE

## 2018-10-05 PROCEDURE — 93325 DOPPLER ECHO COLOR FLOW MAPG: CPT | Performed by: INTERNAL MEDICINE

## 2018-10-05 PROCEDURE — 94760 N-INVAS EAR/PLS OXIMETRY 1: CPT

## 2018-10-05 PROCEDURE — 25010000002 MIDAZOLAM PER 1 MG: Performed by: INTERNAL MEDICINE

## 2018-10-05 PROCEDURE — 93321 DOPPLER ECHO F-UP/LMTD STD: CPT

## 2018-10-05 PROCEDURE — 99152 MOD SED SAME PHYS/QHP 5/>YRS: CPT

## 2018-10-05 RX ORDER — SODIUM CHLORIDE 9 MG/ML
INJECTION, SOLUTION INTRAVENOUS
Status: COMPLETED | OUTPATIENT
Start: 2018-10-05 | End: 2018-10-05

## 2018-10-05 RX ORDER — MIDAZOLAM HYDROCHLORIDE 1 MG/ML
INJECTION INTRAMUSCULAR; INTRAVENOUS
Status: COMPLETED | OUTPATIENT
Start: 2018-10-05 | End: 2018-10-05

## 2018-10-05 RX ORDER — FENTANYL CITRATE 50 UG/ML
INJECTION, SOLUTION INTRAMUSCULAR; INTRAVENOUS
Status: COMPLETED | OUTPATIENT
Start: 2018-10-05 | End: 2018-10-05

## 2018-10-05 RX ADMIN — FENTANYL CITRATE 12.5 MCG: 50 INJECTION INTRAMUSCULAR; INTRAVENOUS at 07:11

## 2018-10-05 RX ADMIN — LIDOCAINE HYDROCHLORIDE 5 ML: 20 SOLUTION ORAL; TOPICAL at 06:58

## 2018-10-05 RX ADMIN — MIDAZOLAM 2 MG: 1 INJECTION INTRAMUSCULAR; INTRAVENOUS at 07:18

## 2018-10-05 RX ADMIN — MIDAZOLAM 2 MG: 1 INJECTION INTRAMUSCULAR; INTRAVENOUS at 07:07

## 2018-10-05 RX ADMIN — MIDAZOLAM 2 MG: 1 INJECTION INTRAMUSCULAR; INTRAVENOUS at 07:10

## 2018-10-05 RX ADMIN — SODIUM CHLORIDE 9 ML/HR: 9 INJECTION, SOLUTION INTRAVENOUS at 06:53

## 2018-10-05 RX ADMIN — FENTANYL CITRATE 25 MCG: 50 INJECTION INTRAMUSCULAR; INTRAVENOUS at 07:08

## 2018-10-05 RX ADMIN — MIDAZOLAM 1 MG: 1 INJECTION INTRAMUSCULAR; INTRAVENOUS at 07:08

## 2018-10-05 RX ADMIN — MIDAZOLAM 1 MG: 1 INJECTION INTRAMUSCULAR; INTRAVENOUS at 07:13

## 2018-10-05 NOTE — PATIENT INSTRUCTIONS
Transesophageal Echocardiogram  Transesophageal echocardiography (ADEEL) is a picture test of your heart using sound waves. The pictures taken can give very detailed pictures of your heart. This can help your doctor see if there are problems with your heart. ADEEL can check:  · If your heart has blood clots in it.  · How well your heart valves are working.  · If you have an infection on the inside of your heart.  · Some of the major arteries of your heart.  · If your heart valve is working after a repair.  · Your heart before a procedure that uses a shock to your heart to get the rhythm back to normal.    What happens before the procedure?  · Do not eat or drink for 6 hours before the procedure or as told by your doctor.  · Make plans to have someone drive you home after the procedure. Do not drive yourself home.  · An IV tube will be put in your arm.  What happens during the procedure?  · You will be given a medicine to help you relax (sedative). It will be given through the IV tube.  · A numbing medicine will be sprayed or gargled in the back of your throat to help numb it.  · The tip of the probe is placed into the back of your mouth. You will be asked to swallow. This helps to pass the probe into your esophagus.  · Once the tip of the probe is in the right place, your doctor can take pictures of your heart.  · You may feel pressure at the back of your throat.  What happens after the procedure?  · You will be taken to a recovery area so the sedative can wear off.  · Your throat may be sore and scratchy. This will go away slowly over time.  · You will go home when you are fully awake and able to swallow liquids.  · You should have someone stay with you for the next 24 hours.  · Do not drive or operate machinery for the next 24 hours.  This information is not intended to replace advice given to you by your health care provider. Make sure you discuss any questions you have with your health care provider.  Document  Released: 10/15/2010 Document Revised: 05/25/2017 Document Reviewed: 06/19/2014  [employer] Interactive Patient Education © 2018 [employer]    Moderate Conscious Sedation, Adult, Care After  These instructions provide you with information about caring for yourself after your procedure. Your health care provider may also give you more specific instructions. Your treatment has been planned according to current medical practices, but problems sometimes occur. Call your health care provider if you have any problems or questions after your procedure.  What can I expect after the procedure?  After your procedure, it is common:  · To feel sleepy for several hours.  · To feel clumsy and have poor balance for several hours.  · To have poor judgment for several hours.  · To vomit if you eat too soon.    Follow these instructions at home:  For at least 24 hours after the procedure:    · Do not:  ? Participate in activities where you could fall or become injured.  ? Drive.  ? Use heavy machinery.  ? Drink alcohol.  ? Take sleeping pills or medicines that cause drowsiness.  ? Make important decisions or sign legal documents.  ? Take care of children on your own.  · Rest.  Eating and drinking  · Follow the diet recommended by your health care provider.  · If you vomit:  ? Drink water, juice, or soup when you can drink without vomiting.  ? Make sure you have little or no nausea before eating solid foods.  General instructions  · Have a responsible adult stay with you until you are awake and alert.  · Take over-the-counter and prescription medicines only as told by your health care provider.  · If you smoke, do not smoke without supervision.  · Keep all follow-up visits as told by your health care provider. This is important.  Contact a health care provider if:  · You keep feeling nauseous or you keep vomiting.  · You feel light-headed.  · You develop a rash.  · You have a fever.  Get help right away if:  · You have trouble  breathing.  This information is not intended to replace advice given to you by your health care provider. Make sure you discuss any questions you have with your health care provider.  Document Released: 10/08/2014 Document Revised: 05/22/2017 Document Reviewed: 04/08/2017  [employer] Interactive Patient Education © 2018 [employer]

## 2018-10-23 DIAGNOSIS — I63.9 CEREBROVASCULAR ACCIDENT (CVA), UNSPECIFIED MECHANISM (HCC): Primary | ICD-10-CM

## 2018-10-23 DIAGNOSIS — Q21.10 ASD (ATRIAL SEPTAL DEFECT): ICD-10-CM

## 2018-10-25 PROBLEM — I63.9 CEREBROVASCULAR ACCIDENT (CVA) (HCC): Status: ACTIVE | Noted: 2018-10-25

## 2018-10-25 PROBLEM — Q21.10 ASD (ATRIAL SEPTAL DEFECT): Status: ACTIVE | Noted: 2018-10-25

## 2018-11-06 ENCOUNTER — TRANSCRIBE ORDERS (OUTPATIENT)
Dept: LAB | Facility: HOSPITAL | Age: 43
End: 2018-11-06

## 2018-11-06 ENCOUNTER — LAB (OUTPATIENT)
Dept: LAB | Facility: HOSPITAL | Age: 43
End: 2018-11-06
Attending: INTERNAL MEDICINE

## 2018-11-06 DIAGNOSIS — Z01.810 PRE-OPERATIVE CARDIOVASCULAR EXAMINATION: Primary | ICD-10-CM

## 2018-11-06 DIAGNOSIS — Z13.6 SCREENING FOR ISCHEMIC HEART DISEASE: ICD-10-CM

## 2018-11-06 DIAGNOSIS — I63.9 IMPENDING CEREBROVASCULAR ACCIDENT (HCC): ICD-10-CM

## 2018-11-06 DIAGNOSIS — Z01.810 PRE-OPERATIVE CARDIOVASCULAR EXAMINATION: ICD-10-CM

## 2018-11-06 LAB
ANION GAP SERPL CALCULATED.3IONS-SCNC: 13 MMOL/L
BASOPHILS # BLD AUTO: 0.03 10*3/MM3 (ref 0–0.2)
BASOPHILS NFR BLD AUTO: 0.5 % (ref 0–1.5)
BUN BLD-MCNC: 9 MG/DL (ref 6–20)
BUN/CREAT SERPL: 12.5 (ref 7–25)
CALCIUM SPEC-SCNC: 9.1 MG/DL (ref 8.6–10.5)
CHLORIDE SERPL-SCNC: 107 MMOL/L (ref 98–107)
CO2 SERPL-SCNC: 21 MMOL/L (ref 22–29)
CREAT BLD-MCNC: 0.72 MG/DL (ref 0.57–1)
DEPRECATED RDW RBC AUTO: 50.1 FL (ref 37–54)
EOSINOPHIL # BLD AUTO: 0.04 10*3/MM3 (ref 0–0.7)
EOSINOPHIL NFR BLD AUTO: 0.6 % (ref 0.3–6.2)
ERYTHROCYTE [DISTWIDTH] IN BLOOD BY AUTOMATED COUNT: 17.6 % (ref 11.7–13)
GFR SERPL CREATININE-BSD FRML MDRD: 88 ML/MIN/1.73
GLUCOSE BLD-MCNC: 102 MG/DL (ref 65–99)
HCT VFR BLD AUTO: 30.3 % (ref 35.6–45.5)
HGB BLD-MCNC: 8.2 G/DL (ref 11.9–15.5)
IMM GRANULOCYTES # BLD: 0 10*3/MM3 (ref 0–0.03)
IMM GRANULOCYTES NFR BLD: 0 % (ref 0–0.5)
LYMPHOCYTES # BLD AUTO: 1.86 10*3/MM3 (ref 0.9–4.8)
LYMPHOCYTES NFR BLD AUTO: 29.6 % (ref 19.6–45.3)
MCH RBC QN AUTO: 20.9 PG (ref 26.9–32)
MCHC RBC AUTO-ENTMCNC: 27.1 G/DL (ref 32.4–36.3)
MCV RBC AUTO: 77.3 FL (ref 80.5–98.2)
MONOCYTES # BLD AUTO: 0.52 10*3/MM3 (ref 0.2–1.2)
MONOCYTES NFR BLD AUTO: 8.3 % (ref 5–12)
NEUTROPHILS # BLD AUTO: 3.84 10*3/MM3 (ref 1.9–8.1)
NEUTROPHILS NFR BLD AUTO: 61 % (ref 42.7–76)
PLATELET # BLD AUTO: 403 10*3/MM3 (ref 140–500)
PMV BLD AUTO: 10.5 FL (ref 6–12)
POTASSIUM BLD-SCNC: 4.3 MMOL/L (ref 3.5–5.2)
RBC # BLD AUTO: 3.92 10*6/MM3 (ref 3.9–5.2)
SODIUM BLD-SCNC: 141 MMOL/L (ref 136–145)
WBC NRBC COR # BLD: 6.29 10*3/MM3 (ref 4.5–10.7)

## 2018-11-06 PROCEDURE — 85025 COMPLETE CBC W/AUTO DIFF WBC: CPT

## 2018-11-06 PROCEDURE — 80048 BASIC METABOLIC PNL TOTAL CA: CPT

## 2018-11-06 PROCEDURE — 36415 COLL VENOUS BLD VENIPUNCTURE: CPT

## 2018-11-09 ENCOUNTER — HOSPITAL ENCOUNTER (OUTPATIENT)
Facility: HOSPITAL | Age: 43
Setting detail: HOSPITAL OUTPATIENT SURGERY
Discharge: HOME | End: 2018-11-09
Attending: INTERNAL MEDICINE | Admitting: INTERNAL MEDICINE

## 2018-11-09 VITALS
RESPIRATION RATE: 16 BRPM | WEIGHT: 235 LBS | OXYGEN SATURATION: 98 % | BODY MASS INDEX: 37.77 KG/M2 | SYSTOLIC BLOOD PRESSURE: 97 MMHG | TEMPERATURE: 99.7 F | HEART RATE: 79 BPM | DIASTOLIC BLOOD PRESSURE: 63 MMHG | HEIGHT: 66 IN

## 2018-11-09 DIAGNOSIS — Q21.10 ASD (ATRIAL SEPTAL DEFECT): ICD-10-CM

## 2018-11-09 DIAGNOSIS — I63.9 CEREBROVASCULAR ACCIDENT (CVA), UNSPECIFIED MECHANISM (HCC): ICD-10-CM

## 2018-11-09 LAB
ACT BLD: 164 SECONDS (ref 82–152)
B-HCG UR QL: NEGATIVE
HCT VFR BLDA CALC: 24 % (ref 38–51)
HCT VFR BLDA CALC: 25 % (ref 38–51)
HGB BLDA-MCNC: 8.2 G/DL (ref 12–17)
HGB BLDA-MCNC: 8.5 G/DL (ref 12–17)
INTERNAL NEGATIVE CONTROL: NEGATIVE
INTERNAL POSITIVE CONTROL: POSITIVE
Lab: NORMAL
SAO2 % BLDA: 60 % (ref 95–98)
SAO2 % BLDA: 61 % (ref 95–98)
SAO2 % BLDA: 62 % (ref 95–98)
SAO2 % BLDA: 65 % (ref 95–98)
SAO2 % BLDA: 69 % (ref 95–98)
SAO2 % BLDA: 75 % (ref 95–98)
SAO2 % BLDA: 78 % (ref 95–98)

## 2018-11-09 PROCEDURE — C1894 INTRO/SHEATH, NON-LASER: HCPCS | Performed by: INTERNAL MEDICINE

## 2018-11-09 PROCEDURE — C1892 INTRO/SHEATH,FIXED,PEEL-AWAY: HCPCS | Performed by: INTERNAL MEDICINE

## 2018-11-09 PROCEDURE — 25010000002 FENTANYL CITRATE (PF) 100 MCG/2ML SOLUTION: Performed by: INTERNAL MEDICINE

## 2018-11-09 PROCEDURE — 99152 MOD SED SAME PHYS/QHP 5/>YRS: CPT | Performed by: INTERNAL MEDICINE

## 2018-11-09 PROCEDURE — 85347 COAGULATION TIME ACTIVATED: CPT

## 2018-11-09 PROCEDURE — C1759 CATH, INTRA ECHOCARDIOGRAPHY: HCPCS | Performed by: INTERNAL MEDICINE

## 2018-11-09 PROCEDURE — 85018 HEMOGLOBIN: CPT

## 2018-11-09 PROCEDURE — 93662 INTRACARDIAC ECG (ICE): CPT | Performed by: INTERNAL MEDICINE

## 2018-11-09 PROCEDURE — 85014 HEMATOCRIT: CPT

## 2018-11-09 PROCEDURE — S0260 H&P FOR SURGERY: HCPCS | Performed by: INTERNAL MEDICINE

## 2018-11-09 PROCEDURE — 93580 TRANSCATH CLOSURE OF ASD: CPT | Performed by: INTERNAL MEDICINE

## 2018-11-09 PROCEDURE — 25010000002 HEPARIN (PORCINE) PER 1000 UNITS: Performed by: INTERNAL MEDICINE

## 2018-11-09 PROCEDURE — C1769 GUIDE WIRE: HCPCS | Performed by: INTERNAL MEDICINE

## 2018-11-09 PROCEDURE — 99153 MOD SED SAME PHYS/QHP EA: CPT | Performed by: INTERNAL MEDICINE

## 2018-11-09 PROCEDURE — 25010000002 MIDAZOLAM PER 1 MG: Performed by: INTERNAL MEDICINE

## 2018-11-09 PROCEDURE — 81025 URINE PREGNANCY TEST: CPT | Performed by: INTERNAL MEDICINE

## 2018-11-09 PROCEDURE — 25010000003 CEFAZOLIN 1-4 GM/50ML-% SOLUTION: Performed by: INTERNAL MEDICINE

## 2018-11-09 PROCEDURE — C1817 SEPTAL DEFECT IMP SYS: HCPCS | Performed by: INTERNAL MEDICINE

## 2018-11-09 DEVICE — IMPLANTABLE DEVICE: Type: IMPLANTABLE DEVICE | Status: FUNCTIONAL

## 2018-11-09 RX ORDER — HEPARIN SODIUM 1000 [USP'U]/ML
INJECTION, SOLUTION INTRAVENOUS; SUBCUTANEOUS AS NEEDED
Status: DISCONTINUED | OUTPATIENT
Start: 2018-11-09 | End: 2018-11-09 | Stop reason: HOSPADM

## 2018-11-09 RX ORDER — MIDAZOLAM HYDROCHLORIDE 1 MG/ML
INJECTION INTRAMUSCULAR; INTRAVENOUS AS NEEDED
Status: DISCONTINUED | OUTPATIENT
Start: 2018-11-09 | End: 2018-11-09 | Stop reason: HOSPADM

## 2018-11-09 RX ORDER — SODIUM CHLORIDE 0.9 % (FLUSH) 0.9 %
3-10 SYRINGE (ML) INJECTION AS NEEDED
Status: DISCONTINUED | OUTPATIENT
Start: 2018-11-09 | End: 2018-11-09 | Stop reason: HOSPADM

## 2018-11-09 RX ORDER — FENTANYL CITRATE 50 UG/ML
INJECTION, SOLUTION INTRAMUSCULAR; INTRAVENOUS AS NEEDED
Status: DISCONTINUED | OUTPATIENT
Start: 2018-11-09 | End: 2018-11-09 | Stop reason: HOSPADM

## 2018-11-09 RX ORDER — SODIUM CHLORIDE 9 MG/ML
75 INJECTION, SOLUTION INTRAVENOUS CONTINUOUS
Status: DISCONTINUED | OUTPATIENT
Start: 2018-11-09 | End: 2018-11-09 | Stop reason: HOSPADM

## 2018-11-09 RX ORDER — SODIUM CHLORIDE 0.9 % (FLUSH) 0.9 %
3 SYRINGE (ML) INJECTION EVERY 12 HOURS SCHEDULED
Status: DISCONTINUED | OUTPATIENT
Start: 2018-11-09 | End: 2018-11-09 | Stop reason: HOSPADM

## 2018-11-09 RX ORDER — CLOPIDOGREL BISULFATE 75 MG/1
75 TABLET ORAL DAILY
Qty: 30 TABLET | Refills: 0 | Status: SHIPPED | OUTPATIENT
Start: 2018-11-09 | End: 2019-01-07

## 2018-11-09 RX ORDER — LIDOCAINE HYDROCHLORIDE 20 MG/ML
10 INJECTION, SOLUTION INFILTRATION; PERINEURAL ONCE
Status: DISCONTINUED | OUTPATIENT
Start: 2018-11-09 | End: 2018-11-09 | Stop reason: HOSPADM

## 2018-11-09 RX ORDER — CEFAZOLIN SODIUM 1 G/50ML
INJECTION, SOLUTION INTRAVENOUS CONTINUOUS PRN
Status: COMPLETED | OUTPATIENT
Start: 2018-11-09 | End: 2018-11-09

## 2018-11-09 RX ORDER — SODIUM CHLORIDE 9 MG/ML
100 INJECTION, SOLUTION INTRAVENOUS CONTINUOUS
Status: DISCONTINUED | OUTPATIENT
Start: 2018-11-09 | End: 2018-11-09 | Stop reason: HOSPADM

## 2018-11-09 RX ORDER — LIDOCAINE HYDROCHLORIDE 10 MG/ML
0.1 INJECTION, SOLUTION EPIDURAL; INFILTRATION; INTRACAUDAL; PERINEURAL ONCE AS NEEDED
Status: DISCONTINUED | OUTPATIENT
Start: 2018-11-09 | End: 2018-11-09 | Stop reason: HOSPADM

## 2018-11-09 RX ORDER — CLOPIDOGREL BISULFATE 75 MG/1
TABLET ORAL AS NEEDED
Status: DISCONTINUED | OUTPATIENT
Start: 2018-11-09 | End: 2018-11-09 | Stop reason: HOSPADM

## 2018-11-09 RX ORDER — LIDOCAINE HYDROCHLORIDE 20 MG/ML
INJECTION, SOLUTION INFILTRATION; PERINEURAL AS NEEDED
Status: DISCONTINUED | OUTPATIENT
Start: 2018-11-09 | End: 2018-11-09 | Stop reason: HOSPADM

## 2018-11-09 RX ORDER — HYDROCODONE BITARTRATE AND ACETAMINOPHEN 7.5; 325 MG/1; MG/1
1 TABLET ORAL ONCE AS NEEDED
Status: COMPLETED | OUTPATIENT
Start: 2018-11-09 | End: 2018-11-09

## 2018-11-09 RX ADMIN — SODIUM CHLORIDE 100 ML/HR: 9 INJECTION, SOLUTION INTRAVENOUS at 11:01

## 2018-11-09 RX ADMIN — HYDROCODONE BITARTRATE AND ACETAMINOPHEN 1 TABLET: 7.5; 325 TABLET ORAL at 13:24

## 2018-11-09 RX ADMIN — SODIUM CHLORIDE 75 ML/HR: 9 INJECTION, SOLUTION INTRAVENOUS at 08:27

## 2018-11-09 NOTE — H&P
History And Physical Assessment    Patient Name: Mariaelena Mack  Age/Sex: 43 y.o. female  : 1975  MRN: [id number]    Date of Hospital Admission: 2018  Date of Encounter Visit: 18  Encounter Provider: Kadeem Greene MD  Place of Service: [facility] CARDIOLOGY  Patient Care Team:  Provider, No Known as PCP - Soraida García MD as Consulting Physician (Obstetrics and Gynecology)  India Stubbs MD as Referring Physician (Family Medicine)  Sandy Alicea MD as Consulting Physician (Hematology and Oncology)  William Patino MD as Consulting Physician (Cardiology)    Subjective:     Chief Complaint:  ASD, stroke    History of Present Illness:  Mariaelena Mack is a 43 y.o. female with history of stroke and L ICA aneurysm.  She was discovered to have a secundum ASD by ADEEL.  She is now here for ASD closure.        Past Medical History:  Past Medical History:   Diagnosis Date   • Anxiety     WITH MEDICAL HX, NO MEDS   • Bulging of thoracic intervertebral disc     over 20 yrs ago   • Cerebral aneurysm    • Headache, tension-type    • History of anemia     IRON INFUSIONS 2018   • History of transfusion    • Internal carotid aneurysm     Left ICA aneurysm with partial thrombosis 10/29/17 - resulting in left MCA CVA.   • Iron deficiency anemia 2017   • Left middle cerebral artery stroke (CMS/HCC) 10/29/2017   • Migraine    • PFO (patent foramen ovale)    • Sciatica of left side    • Stroke (CMS/HCC)    • Tattoos    • Uterine fibroid        Past Surgical History:   Procedure Laterality Date   • CEREBRAL ANGIOGRAM N/A 2018    Procedure: CEREBRAL ANGIOGRAM;  Surgeon: Roger Day MD;  Location: [facility] OR ;  Service: Neurosurgery   • EMBOLIZATION CEREBRAL N/A 2017    Procedure: Cerebral angiogram and embolization of cerebral angiogram with Pipeline Embolization Device and coils.;  Surgeon: Roger Day MD;  [facility]:  [facility] 18/19;  Service:        Home Medications:   Prescriptions Prior to Admission   Medication Sig Dispense Refill Last Dose   • aspirin  MG EC tablet Take 1 tablet by mouth Daily. (Patient taking differently: Take 325 mg by mouth Daily. PT TO KEEP TAKING, EVEN DAY OF PER PT) 30 tablet 11 11/9/2018 at Unknown time   • atorvastatin (LIPITOR) 40 MG tablet Take 1 tablet by mouth Every Night. 30 tablet 1 11/8/2018 at Unknown time   • cetirizine (zyrTEC) 10 MG tablet Take 10 mg by mouth Daily As Needed for Allergies.   11/9/2018 at Unknown time   • ibuprofen (ADVIL,MOTRIN) 200 MG tablet Take 200 mg by mouth As Needed. STOPPED PREOP, LAST DOSE 6/3/18   11/8/2018 at Unknown time   • Multiple Vitamin (MULTI-VITAMIN DAILY PO) Take 1 tablet by mouth Daily. STOPPED PREOP, LAST DOSE 6/5/18 11/8/2018 at Unknown time   • vitamin B-12 (CYANOCOBALAMIN) 1000 MCG tablet Take 1,000 mcg by mouth Daily.   11/9/2018 at Unknown time       Allergies:  No Known Allergies    Past Social History:  Social History     Social History   • Marital status:      Spouse name: Silvestre   • Number of children: 1   • Years of education: College     Occupational History   •  [employer]     full-time     Social History Main Topics   • Smoking status: Former Smoker     Packs/day: 1.00     Years: 21.00     Types: Electronic Cigarette, Cigarettes     Quit date: 2015   • Smokeless tobacco: Never Used      Comment: uses Vaporizor   • Alcohol use 12.0 oz/week     20 Cans of beer per week      Comment: weekends   • Drug use: No   • Sexual activity: Yes     Partners: Male     Birth control/ protection: None     Other Topics Concern   • Not on file     Social History Narrative    Patient lives with  and socially drinks fairly heavily most weekends. States 6-8 beers on weekend nights. Less on weekdays.       Past Family History:  Family History   Problem Relation Age of Onset   • Migraines Mother     • Heart failure Mother    • Stroke Mother    • Stroke Maternal Grandmother    • Diabetes Maternal Grandmother    • Heart disease Father    • Cancer Sister    • Stroke Sister    • Breast cancer Sister 54   • Bone cancer Sister 54   • Diabetes Brother    • Leukemia Paternal Uncle    • Malig Hyperthermia Neg Hx        Review of Systems:   All systems reviewed. Pertinent positives identified in HPI. All other systems are negative.    Objective:   Temp:  [99.7 °F (37.6 °C)] 99.7 °F (37.6 °C)  Heart Rate:  [81] 81  Resp:  [16] 16  BP: (119)/(79) 119/79  No intake or output data in the 24 hours ending 11/09/18 0857  Body mass index is 38.51 kg/m².  1    11/09/18  0823   Weight: 107 kg (235 lb)     Weight change:     Physical Exam:   Physical Exam   Constitutional: She is oriented to person, place, and time. She appears well-developed and well-nourished.   HENT:   Head: Normocephalic and atraumatic.   Right Ear: External ear normal.   Left Ear: External ear normal.   Mouth/Throat: Oropharynx is clear and moist.   Eyes: Pupils are equal, round, and reactive to light. Conjunctivae and EOM are normal.   Neck: Normal range of motion. Neck supple.   Cardiovascular: Normal rate, regular rhythm, normal heart sounds and intact distal pulses.    Pulmonary/Chest: Effort normal and breath sounds normal.   Abdominal: Soft. Bowel sounds are normal.   Musculoskeletal: Normal range of motion.   Neurological: She is alert and oriented to person, place, and time.   Skin: Skin is warm and dry.   Psychiatric: She has a normal mood and affect. Her behavior is normal. Judgment and thought content normal.   Vitals reviewed.      Lab Review:     Results from last 7 days  Lab Units 11/06/18  1007   SODIUM mmol/L 141   POTASSIUM mmol/L 4.3   CHLORIDE mmol/L 107   CO2 mmol/L 21.0*   BUN mg/dL 9   CREATININE mg/dL 0.72   GLUCOSE mg/dL 102*   CALCIUM mg/dL 9.1           Results from last 7 days  Lab Units 11/06/18  1007   WBC 10*3/mm3 6.29    HEMOGLOBIN g/dL 8.2*   HEMATOCRIT % 30.3*   PLATELETS 10*3/mm3 403                   Invalid input(s): LDLCALC            Imaging:  Imaging Results (most recent)     None        I personally viewed and interpreted the patient's EKG    Assessment:       ASD (atrial septal defect)    Cerebrovascular accident (CVA) (CMS/[facility])        Plan:     43 year old woman with history of stroke and L ICA aneurysm.  Now with secundum ASD.   Here for closure.  See Dr. Patino's note for details.    Kadeem Greene MD  11/09/18  8:57 AM

## 2018-11-09 NOTE — DISCHARGE INSTRUCTIONS
**BEGIN NEW MEDICATION TOMORROW, YOU HAD A DOSE OF PLAVIX ON 11/9/18 AT THE HOSPITAL    SEE DEVICE CLOSURE CARD ATTACHED.  KEEPTHE OCCLUDER ID CARD IN A SAFE PLACE SUCH AS YOUR WALLET.      [facility]  Cardiology  [address] [place], [place] [id number] [phone]    After Care    Refer to this sheet in the next few weeks. These instructions provide you with information on caring for yourself after your procedure. Your health care provider may also give you more specific instructions. Your treatment has been planned according to current medical practices, but problems sometimes occur. Call your health care provider if you have any problems or questions after your procedure.      What to Expect After the Procedure:  After your procedure, it is typical to have the following sensations:  · Minor discomfort or tenderness and a small bump at the catheter insertion site(s). The bump(s) should usually decrease in size and tenderness within 1 to 2 weeks.  · Any bruising will usually fade within 2 to 4 weeks.  Home Care Instructions:  · Do not apply powder or lotion to the site.  · Do not take baths, swim, or use a hot tub until your health care provider approves and the site is completely healed.  · Do not bend, squat, or lift anything over 20 lb (9 kg) or as directed by your health care provider. However, we recommend lifting nothing heavier than a gallon of milk.    · You may shower 24 hours after the procedure. Remove the bandage (dressing) and gently wash the site with plain soap and water. Gently pat the site dry. You may apply a band aid daily for 2 days if desired.    · Inspect the site at least twice daily.  · Increase your fluid intake for the next 2 days.    · Limit your activity for the first 48 hours.   · Avoid strenuous activity for 1 week or as advised by your physician.    · Follow instructions about when you can drive or return to work as directed by your physician.    · Hold direct pressure  over the site when you cough, sneeze, laugh or change positions.  Do this for the next 2 days.    Call Your Doctor If:  · You have drainage (other than a small amount of blood on the dressing).  · You have chills or a fever > 101.  · You have redness, warmth, swelling (larger than a walnut), or pain at the insertion   · You develop palpitations, chest pain or shortness of breath, feel faint, or pass out.  · You develop pain, discoloration, coldness, numbness, tingling, or severe bruising in the leg that held the catheter.  · You develop bleeding from any other place, such as the bowels.  · You have heavy bleeding from the site.  If this happens, hold pressure on the site and call 911.        Make Sure You:  · Understand these instructions.  · Will watch your condition.  · Will get help right away if you are not doing well or get worse.      Moderate Conscious Sedation, Adult, Care After  Refer to this sheet in the next few weeks. These instructions provide you with information on caring for yourself after your procedure. Your health care provider may also give you more specific instructions. Your treatment has been planned according to current medical practices, but problems sometimes occur. Call your health care provider if you have any problems or questions after your procedure.  WHAT TO EXPECT AFTER THE PROCEDURE   After your procedure:  · You may feel sleepy, clumsy, and have poor balance for several hours.  · Vomiting may occur if you eat too soon after the procedure.  HOME CARE INSTRUCTIONS  · Do not participate in any activities where you could become injured for at least 24 hours. Do not:    Drive.    Swim.    Ride a bicycle.    Operate heavy machinery.    Cook.    Use power tools.    Climb ladders.    Work from a high place.  · Do not make important decisions or sign legal documents until you are improved.  · If you vomit, drink water, juice, or soup when you can drink without vomiting. Make sure you have  little or no nausea before eating solid foods.  · Only take over-the-counter or prescription medicines for pain, discomfort, or fever as directed by your health care provider.  · Make sure you and your family fully understand everything about the medicines given to you, including what side effects may occur.  · You should not drink alcohol, take sleeping pills, or take medicines that cause drowsiness for at least 24 hours.  · If you smoke, do not smoke without supervision.  · If you are feeling better, you may resume normal activities 24 hours after you were sedated.  · Keep all appointments with your health care provider.  · You should have a responsible adult stay with you for the first 24 hours post procedure.  SEEK MEDICAL CARE IF:  · Your skin is pale or bluish in color.  · You continue to feel nauseous or vomit.  · Your pain is getting worse and is not helped by medicine.  · You have bleeding or swelling.  · You are still sleepy or feeling clumsy after 24 hours.  SEEK IMMEDIATE MEDICAL CARE IF:  · You develop a rash.  · You have difficulty breathing.  · You develop any type of allergic problem.  · You have a fever.  MAKE SURE YOU:  · Understand these instructions.  · Will watch your condition.  · Will get help right away if you are not doing well or get worse.     This information is not intended to replace advice given to you by your health care provider. Make sure you discuss any questions you have with your health care provider.     Document Released: 10/08/2014 Document Revised: 01/08/2016 Document Reviewed: 10/08/2014  [employer] Patient Education ©2016 [employer]

## 2018-11-16 ENCOUNTER — OFFICE VISIT (OUTPATIENT)
Dept: CARDIOLOGY | Facility: CLINIC | Age: 43
End: 2018-11-16

## 2018-11-16 VITALS
OXYGEN SATURATION: 99 % | WEIGHT: 238 LBS | SYSTOLIC BLOOD PRESSURE: 110 MMHG | HEART RATE: 89 BPM | BODY MASS INDEX: 38.25 KG/M2 | DIASTOLIC BLOOD PRESSURE: 72 MMHG | HEIGHT: 66 IN

## 2018-11-16 DIAGNOSIS — Z87.74 HX OF PERCUTANEOUS TRANSCATHETER CLOSURE OF CONGENITAL ASD: Primary | ICD-10-CM

## 2018-11-16 DIAGNOSIS — Q21.10 ASD (ATRIAL SEPTAL DEFECT): ICD-10-CM

## 2018-11-16 PROCEDURE — 99213 OFFICE O/P EST LOW 20 MIN: CPT | Performed by: NURSE PRACTITIONER

## 2018-11-16 NOTE — PROGRESS NOTES
Patient Name: Mariaelena Mack  :1975  Age: 43 y.o.  Primary Cardiologist: Daquan Patino MD  Encounter Provider:  GRIS Ramires      Chief Complaint:   Chief Complaint   Patient presents with   • Cerebrovascular Accident         HPI  Mariaelena Mack is a 43 y.o. female with a history significant for left ICA aneurysm, left MCA CVA and ASD.  Patient underwent successful ICE-guided ASD closure on 2018.  Patient should be on dual antiplatelet therapy for one month.  Antibiotic prophylaxis prior to invasive dental procedures for one year.  Patient presents today for 1 week follow-up after procedure.  Patient is new to me but I have reviewed prior medical records.  Patient states since the procedure she has done well.  She denies episodes of chest pain, shortness of breath, palpitations, lightheadedness, swelling.  Patient does report fatigue but feels that this is related to vaginal bleeding she has been battling.  She states that she is working with her gynecologist in regards to this.  She states that this bleeding is a little bit worse now that she is on Plavix.  Reports that catheter insertion sites have healed well with no erythema or bruising.    The following portions of the patient's history were reviewed and updated as appropriate: allergies, current medications, past family history, past medical history, past social history, past surgical history and problem list.    Current Outpatient Medications on File Prior to Visit   Medication Sig   • aspirin  MG EC tablet Take 1 tablet by mouth Daily. (Patient taking differently: Take 325 mg by mouth Daily. PT TO KEEP TAKING, EVEN DAY OF PER PT)   • atorvastatin (LIPITOR) 40 MG tablet Take 1 tablet by mouth Every Night.   • cetirizine (zyrTEC) 10 MG tablet Take 10 mg by mouth Daily As Needed for Allergies.   • clopidogrel (PLAVIX) 75 MG tablet Take 1 tablet by mouth Daily.   • ibuprofen (ADVIL,MOTRIN) 200 MG tablet Take  "200 mg by mouth As Needed. STOPPED PREOP, LAST DOSE 6/3/18   • Multiple Vitamin (MULTI-VITAMIN DAILY PO) Take 1 tablet by mouth Daily. STOPPED PREOP, LAST DOSE 6/5/18   • vitamin B-12 (CYANOCOBALAMIN) 1000 MCG tablet Take 1,000 mcg by mouth Daily.     No current facility-administered medications on file prior to visit.          Review of Systems   Constitution: Positive for malaise/fatigue.   Cardiovascular: Negative for chest pain and leg swelling.   Respiratory: Negative for shortness of breath.    Genitourinary: Positive for non-menstrual bleeding.   Neurological: Negative for light-headedness.   All other systems reviewed and are negative.      OBJECTIVE:   Vital Signs  Vitals:    11/16/18 1034   BP: 110/72   Pulse: 89   SpO2: 99%     Estimated body mass index is 38.41 kg/m² as calculated from the following:    Height as of this encounter: 167.6 cm (66\").    Weight as of this encounter: 108 kg (238 lb).    Physical Exam   Constitutional: She is oriented to person, place, and time. Vital signs are normal. She appears well-developed and well-nourished. She is active.   Eyes: Conjunctivae are normal.   Neck: Carotid bruit is not present.   Cardiovascular: Normal rate, regular rhythm and normal heart sounds.   Pulmonary/Chest: Breath sounds normal.   Abdominal: Normal appearance.   Musculoskeletal:   No cyanosis, clubbing, edema  Normal ROM   Neurological: She is alert and oriented to person, place, and time. GCS eye subscore is 4. GCS verbal subscore is 5. GCS motor subscore is 6.   Skin: Skin is warm, dry and intact.   Psychiatric: She has a normal mood and affect. Her speech is normal and behavior is normal. Judgment and thought content normal. Cognition and memory are normal.       Procedures    Cardiac Cath:  11/9/2018: ASD Closure     FINDINGS:     1) There was a 9 mm secundum ASD.     2) Successful ICE-guided ASD closure was performed as described above.     RECOMMENDATIONS: Dual antiplatelet therapy for one " month.  Antibiotic prophylaxis prior to invasive dental procedures for one year.    Cardiac Echo:  10/5/2018: ADEEL  · Left ventricular systolic function is normal. Estimated EF appears to be in the range of 61 - 65%  · Normal right ventricular systolic function noted. Right ventricular cavity is mildly dilated.  · Left atrial cavity size is mildly dilated.  · There is a secundum ASD with left to right flow. The ASD is near the lip of the aortic root, and measures up to 0.638 cm. The agitated saline study shows prominent negative contrast from the left to right flow, as well as a small amount of bubbles crossing into the left atrium with inspiration  · Mild tricuspid valve regurgitation is present.  · Calculated right ventricular systolic pressure from tricuspid regurgitation is 26 mmHg.  · There is no evidence of pericardial effusion.      ASSESSMENT:      Diagnosis Plan   1. Hx of percutaneous transcatheter closure of congenital ASD     2. ASD (atrial septal defect)           PLAN OF CARE:     1. ASD status post closure: Patient status post transcutaneous closure of ASD November 5, 2018.  Patient states she is doing well postprocedure.  Reports that catheter insertion sites are healed without erythema or bruising.  She denies episodes of chest pain or shortness of breath.  She does report some fatigue secondary to vaginal bleeding.  Patient reports that she was having vaginal bleeding prior to procedure and is working with her gynecologist.  Reports that bleeding has slightly increased since starting Plavix.  Patient will be on Plavix for one month.  She was encouraged to make an appointment with her gynecologist to discuss options to control abnormal bleeding after she stops Plavix.  Patient is agreeable.  2. Follow-up with Dr. Patino in one month or sooner with problems.      Thank you for allowing me to participate in the care of your patient,      Sincerely,   GRIS Ramires  [facility]  11/16/18  10:41 AM

## 2019-01-07 ENCOUNTER — OFFICE VISIT (OUTPATIENT)
Dept: CARDIOLOGY | Facility: CLINIC | Age: 44
End: 2019-01-07

## 2019-01-07 VITALS
HEIGHT: 66 IN | WEIGHT: 233 LBS | OXYGEN SATURATION: 98 % | DIASTOLIC BLOOD PRESSURE: 70 MMHG | HEART RATE: 70 BPM | RESPIRATION RATE: 15 BRPM | SYSTOLIC BLOOD PRESSURE: 122 MMHG | BODY MASS INDEX: 37.45 KG/M2

## 2019-01-07 DIAGNOSIS — Z87.74 STATUS POST DEVICE CLOSURE OF ASD: Primary | ICD-10-CM

## 2019-01-07 DIAGNOSIS — I51.7 RIGHT VENTRICULAR ENLARGEMENT: ICD-10-CM

## 2019-01-07 DIAGNOSIS — I63.032 CEREBROVASCULAR ACCIDENT (CVA) DUE TO THROMBOSIS OF LEFT CAROTID ARTERY (HCC): ICD-10-CM

## 2019-01-07 PROCEDURE — 99213 OFFICE O/P EST LOW 20 MIN: CPT | Performed by: INTERNAL MEDICINE

## 2019-01-20 NOTE — PROGRESS NOTES
Date of Office Visit: 2019  Encounter Provider: William Patino MD  Place of Service: [facility] CARDIOLOGY  Patient Name: Mariaelena Mack  :1975    Chief complaint: Follow-up for left ICA rhythm with partial thrombosis, left MCA CVA,   secundum ASD status post closure, and right ventricular enlargement.    History of Present Illness:    I again had the pleasure of seeing the patient in cardiology office on 2019.  She is a   very pleasant 43 year-old white female with a past medical history significant for a left   internal carotid artery aneurysm, left MCA CVA, and atrial shunt who presents for   follow-up.      I initially saw the patient in the hospital on 10/30/2017.  She had been admitted with   headache, speech impairment, and right-sided facial weakness and numbness.  She   was ultimately found to have a partially thrombosed left internal carotid artery aneurysm.    She also had a left MCA distribution CVA with a nonocclusive proximal thrombus, from   the left internal carotid artery aneurysm.  She ultimately underwent a cerebral   angiogram and embolization of the aneurysm with a Pipeline embolization device and   coils on 2017 by Dr. Day.  While hospitalized, she did have an echocardiogram   performed on 10/29/2017 which showed an ejection fraction of 60-65%, normal   diastolic function, mildly enlarged right ventricle, and a moderately positive saline study   suggestive of an atrial level shunt.    The patient eventually underwent a transesophageal echocardiogram on 10/5/2018.    This showed a secundum ASD with left to right flow.  The agitated saline study   showed prominent negative contrast from the left to right flow, as well as a small   amount of bubbles crossing of the left atrium with inspiration.  Her right ventricle was   again confirmed to be mildly dilated.  The patient ultimately underwent closure of the   ASD on 2018 by   Jc.  The closure device utilized was a 10 mm Amplatzer   septal occluder device.     The patient presents today for follow-up.  She has been doing well, and feels well.    She did complete one month of dual antiplatelet therapy with aspirin and Plavix.  She   is still taking the aspirin at this point.  She has not had any new symptoms.  She   denied any chest pain or shortness of breath.      Past Medical History:   Diagnosis Date   • Anxiety     WITH MEDICAL HX, NO MEDS   • ASD (atrial septal defect)     Status post closure with a 10 mm Amplatzer septal occluder device by Dr. Greene on 11/9/2018.   • Bulging of thoracic intervertebral disc     over 20 yrs ago   • Headache, tension-type    • History of anemia     IRON INFUSIONS 2/2018   • History of transfusion    • Internal carotid aneurysm     Left ICA aneurysm with partial thrombosis 10/29/17 - resulting in left MCA CVA.   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   • Migraine    • Right ventricular enlargement    • Sciatica of left side    • Stroke (CMS/HCC)    • Tattoos    • Uterine fibroid        Past Surgical History:   Procedure Laterality Date   • CARDIAC CATHETERIZATION N/A 11/9/2018    Procedure: Septal Defect Closure;  Surgeon: Kadeem Greene MD;  Location: [facility] INVASIVE LOCATION;  Service: Cardiology   • CEREBRAL ANGIOGRAM N/A 6/11/2018    Procedure: CEREBRAL ANGIOGRAM;  Surgeon: Roger Day MD;  Location: [facility] OR 18/19;  Service: Neurosurgery   • EMBOLIZATION CEREBRAL N/A 11/2/2017    Procedure: Cerebral angiogram and embolization of cerebral angiogram with Pipeline Embolization Device and coils.;  Surgeon: Roger Day MD;  Location: [facility] OR 18/19;  Service:        Current Outpatient Medications on File Prior to Visit   Medication Sig Dispense Refill   • aspirin  MG EC tablet Take 1 tablet by mouth Daily. (Patient taking differently: Take 325 mg by mouth Daily. PT  TO KEEP TAKING, EVEN DAY OF PER PT) 30 tablet 11   • atorvastatin (LIPITOR) 40 MG tablet Take 1 tablet by mouth Every Night. 30 tablet 1   • cetirizine (zyrTEC) 10 MG tablet Take 10 mg by mouth Daily As Needed for Allergies.     • ibuprofen (ADVIL,MOTRIN) 200 MG tablet Take 200 mg by mouth As Needed. STOPPED PREOP, LAST DOSE 6/3/18     • Multiple Vitamin (MULTI-VITAMIN DAILY PO) Take 1 tablet by mouth Daily. STOPPED PREOP, LAST DOSE 18     • vitamin B-12 (CYANOCOBALAMIN) 1000 MCG tablet Take 1,000 mcg by mouth Daily.       No current facility-administered medications on file prior to visit.      Allergies as of 2019   • (No Known Allergies)     Social History     Socioeconomic History   • Marital status:      Spouse name: Silvestre   • Number of children: 1   • Years of education: College   • Highest education level: Not on file   Social Needs   • Financial resource strain: Not on file   • Food insecurity - worry: Not on file   • Food insecurity - inability: Not on file   • Transportation needs - medical: Not on file   • Transportation needs - non-medical: Not on file   Occupational History   • Occupation:      Employer: [employer]     Comment: full-time   Tobacco Use   • Smoking status: Former Smoker     Packs/day: 1.00     Years: 21.00     Pack years: 21.00     Types: Electronic Cigarette, Cigarettes     Last attempt to quit: 2015     Years since quittin.0   • Smokeless tobacco: Never Used   • Tobacco comment: uses Vaporizor   Substance and Sexual Activity   • Alcohol use: Yes     Alcohol/week: 12.0 oz     Types: 20 Cans of beer per week     Comment: weekends   • Drug use: No   • Sexual activity: Yes     Partners: Male     Birth control/protection: None   Other Topics Concern   • Not on file   Social History Narrative    Patient lives with  and socially drinks fairly heavily most weekends. States 6-8 beers on weekend nights. Less on weekdays.     Family  "History   Problem Relation Age of Onset   • Migraines Mother    • Heart failure Mother    • Stroke Mother    • Stroke Maternal Grandmother    • Diabetes Maternal Grandmother    • Heart disease Father    • Cancer Sister    • Stroke Sister    • Breast cancer Sister 54   • Bone cancer Sister 54   • Diabetes Brother    • Leukemia Paternal Uncle    • Malig Hyperthermia Neg Hx        Review of Systems   All other systems reviewed and are negative.     Objective:     Vitals:    01/07/19 1305   BP: 122/70   BP Location: Right arm   Patient Position: Sitting   Cuff Size: Adult   Pulse: 70   Resp: 15   SpO2: 98%   Weight: 106 kg (233 lb)   Height: 167.6 cm (65.98\")     Body mass index is 37.63 kg/m².    Physical Exam   Constitutional: She is oriented to person, place, and time. She appears well-developed and well-nourished.   HENT:   Head: Normocephalic and atraumatic.   Eyes: Conjunctivae are normal.   Neck: Neck supple.   Cardiovascular: Normal rate and regular rhythm. Exam reveals no gallop and no friction rub.   No murmur heard.  Pulmonary/Chest: Effort normal and breath sounds normal.   Abdominal: Soft. There is no tenderness.   Musculoskeletal: She exhibits no edema.   Neurological: She is alert and oriented to person, place, and time.   Skin: Skin is warm.   Psychiatric: She has a normal mood and affect. Her behavior is normal.     Lab Review:   Procedures    Cardiac Procedures:  1.  Echocardiogram on 10/29/2017: Ejection fraction was 60-65%.  There was normal   diastolic function.  The right ventricle was mildly dilated. The saline study was   moderately positive suggestive of an atrial level shunt.  2.  Transesophageal echocardiogram on 10/5/2018: The ejection fraction was 60-65%.    The right ventricle was mildly dilated.  The left atrium was mildly dilated.  There was a   secundum ASD with left to right flow.  There was mild tricuspid regurgitation.  3.  Status post percutaneous ASD closure on 11/9/2018 by Dr. Greene.  " The device utilized   was a 10 mm Amplatzer septal occluder device    Assessment:       Diagnosis Plan   1. Status post device closure of ASD  Adult Transthoracic Echo Limited W/ Cont if Necessary Per Protocol   2. Right ventricular enlargement     3. Cerebrovascular accident (CVA) due to thrombosis of left carotid artery (CMS/HCC)       Plan:       The patient seems to be doing well.  She tolerated the ASD closure without any difficulty.    She has now completed one month of dual antiplatelet therapy, and is off Plavix.  She will   remain indefinitely on aspirin 81 mg.  She will need endocarditis prophylaxis prior to dental   cleanings and dental procedures for one year.  I did emphasize the importance of this with   her.  I will check a limited echocardiogram with bubble study at this point to ensure that the   closure device is in proper position.  Otherwise, I will see her back in the office in 8 months.

## 2019-02-11 ENCOUNTER — HOSPITAL ENCOUNTER (OUTPATIENT)
Dept: CARDIOLOGY | Facility: HOSPITAL | Age: 44
Discharge: HOME | End: 2019-02-11
Attending: INTERNAL MEDICINE | Admitting: INTERNAL MEDICINE

## 2019-02-11 VITALS — HEART RATE: 75 BPM | WEIGHT: 235 LBS | HEIGHT: 65 IN | BODY MASS INDEX: 39.15 KG/M2

## 2019-02-11 DIAGNOSIS — Z87.74 STATUS POST DEVICE CLOSURE OF ASD: ICD-10-CM

## 2019-02-11 LAB
BH CV ECHO MEAS - ACS: 2.2 CM
BH CV ECHO MEAS - AO ROOT AREA (BSA CORRECTED): 1.4
BH CV ECHO MEAS - AO ROOT AREA: 7.4 CM^2
BH CV ECHO MEAS - AO ROOT DIAM: 3.1 CM
BH CV ECHO MEAS - BSA(HAYCOCK): 2.3 M^2
BH CV ECHO MEAS - BSA: 2.1 M^2
BH CV ECHO MEAS - BZI_BMI: 39.1 KILOGRAMS/M^2
BH CV ECHO MEAS - BZI_METRIC_HEIGHT: 165.1 CM
BH CV ECHO MEAS - BZI_METRIC_WEIGHT: 106.6 KG
BH CV ECHO MEAS - EDV(CUBED): 191.3 ML
BH CV ECHO MEAS - EDV(MOD-SP4): 103 ML
BH CV ECHO MEAS - EDV(TEICH): 164 ML
BH CV ECHO MEAS - EF(CUBED): 72.1 %
BH CV ECHO MEAS - EF(MOD-BP): 62 %
BH CV ECHO MEAS - EF(MOD-SP4): 62.1 %
BH CV ECHO MEAS - EF(TEICH): 63.1 %
BH CV ECHO MEAS - ESV(CUBED): 53.3 ML
BH CV ECHO MEAS - ESV(MOD-SP4): 39 ML
BH CV ECHO MEAS - ESV(TEICH): 60.6 ML
BH CV ECHO MEAS - FS: 34.7 %
BH CV ECHO MEAS - IVS/LVPW: 1.1
BH CV ECHO MEAS - IVSD: 1 CM
BH CV ECHO MEAS - LV DIASTOLIC VOL/BSA (35-75): 48.6 ML/M^2
BH CV ECHO MEAS - LV MASS(C)D: 218.7 GRAMS
BH CV ECHO MEAS - LV MASS(C)DI: 103.2 GRAMS/M^2
BH CV ECHO MEAS - LV SYSTOLIC VOL/BSA (12-30): 18.4 ML/M^2
BH CV ECHO MEAS - LVIDD: 5.8 CM
BH CV ECHO MEAS - LVIDS: 3.8 CM
BH CV ECHO MEAS - LVLD AP4: 7.1 CM
BH CV ECHO MEAS - LVLS AP4: 5.8 CM
BH CV ECHO MEAS - LVPWD: 0.92 CM
BH CV ECHO MEAS - RAP SYSTOLE: 8 MMHG
BH CV ECHO MEAS - RVSP: 34 MMHG
BH CV ECHO MEAS - SI(CUBED): 65.1 ML/M^2
BH CV ECHO MEAS - SI(MOD-SP4): 30.2 ML/M^2
BH CV ECHO MEAS - SI(TEICH): 48.8 ML/M^2
BH CV ECHO MEAS - SUP REN AO DIAM: 1.8 CM
BH CV ECHO MEAS - SV(CUBED): 137.9 ML
BH CV ECHO MEAS - SV(MOD-SP4): 64 ML
BH CV ECHO MEAS - SV(TEICH): 103.5 ML
BH CV ECHO MEAS - TR MAX VEL: 257.9 CM/SEC
MAXIMAL PREDICTED HEART RATE: 176 BPM
STRESS TARGET HR: 150 BPM

## 2019-02-11 PROCEDURE — 93308 TTE F-UP OR LMTD: CPT

## 2019-02-11 PROCEDURE — 93308 TTE F-UP OR LMTD: CPT | Performed by: INTERNAL MEDICINE

## 2019-02-14 ENCOUNTER — OFFICE VISIT (OUTPATIENT)
Dept: NEUROLOGY | Facility: CLINIC | Age: 44
End: 2019-02-14

## 2019-02-14 ENCOUNTER — LAB (OUTPATIENT)
Dept: LAB | Facility: HOSPITAL | Age: 44
End: 2019-02-14

## 2019-02-14 VITALS
DIASTOLIC BLOOD PRESSURE: 62 MMHG | OXYGEN SATURATION: 99 % | HEART RATE: 80 BPM | WEIGHT: 235 LBS | BODY MASS INDEX: 39.15 KG/M2 | SYSTOLIC BLOOD PRESSURE: 114 MMHG | HEIGHT: 65 IN

## 2019-02-14 DIAGNOSIS — E78.5 HYPERLIPIDEMIA LDL GOAL <70: ICD-10-CM

## 2019-02-14 DIAGNOSIS — Z86.79 HX OF CEREBRAL ANEURYSM REPAIR: ICD-10-CM

## 2019-02-14 DIAGNOSIS — E53.8 B12 DEFICIENCY: ICD-10-CM

## 2019-02-14 DIAGNOSIS — I63.512 LEFT MIDDLE CEREBRAL ARTERY STROKE (HCC): ICD-10-CM

## 2019-02-14 DIAGNOSIS — Z86.69 HISTORY OF MIGRAINE: ICD-10-CM

## 2019-02-14 DIAGNOSIS — Z98.890 HX OF CEREBRAL ANEURYSM REPAIR: ICD-10-CM

## 2019-02-14 LAB
CHOLEST SERPL-MCNC: 149 MG/DL (ref 0–200)
HDLC SERPL-MCNC: 94 MG/DL (ref 40–60)
LDLC SERPL CALC-MCNC: 45 MG/DL (ref 0–100)
LDLC/HDLC SERPL: 0.48 {RATIO}
TRIGL SERPL-MCNC: 50 MG/DL (ref 0–150)
VLDLC SERPL-MCNC: 10 MG/DL (ref 5–40)

## 2019-02-14 PROCEDURE — 80061 LIPID PANEL: CPT | Performed by: NURSE PRACTITIONER

## 2019-02-14 PROCEDURE — 36415 COLL VENOUS BLD VENIPUNCTURE: CPT

## 2019-02-14 PROCEDURE — 99214 OFFICE O/P EST MOD 30 MIN: CPT | Performed by: NURSE PRACTITIONER

## 2019-02-14 RX ORDER — ASPIRIN 81 MG/1
81 TABLET ORAL DAILY
COMMUNITY

## 2019-02-14 NOTE — PROGRESS NOTES
"DOS: 2019  NAME: Mariaelena Mack   : 1975  PCP: Provider, No Known    Chief Complaint   Patient presents with   • Stroke      SUBJECTIVE  Neurological Problem:  44 y.o. RHW female with B12 def and h/o migraines, LMCA stroke, large left ICA aneurysm (s/p embolization) and tobacco abuse who presents today for stroke follow-up. She is unaccompanied. Patient and problem are new to examiner. History is provided by patient and review of records that are summarized below.     Interval History:   Ms. Mack initially presented to [facility] on 10/29/17 with right facial droop and dysarthria.  She was found to have a left MCA stroke to thrombosis from a large left ICA aneurysm.  She underwent Pipeline embolization device and coils on 2017 by Dr. Day and was discharged on Brillinta, aspirin and Lipitor.  Per [facility] notes, f/u cerebral angiogram in 2018 showed \"100% occlusion of large left carotid periophthalmic artery aneurysm\" and her Brillinta was discontinued.    During her initial hospitalization, her stroke workup included a TTE that showed a borderline dilated LA with positive saline study. She subsequently underwent a ADEEL on 10/5/18 that showed secundum ASD with left to right flow and prominent negative contrast from left to right. She had closure of the ASD on 18 by Dr. Greene. A f/u echocardigram last week showed closure device with a negative saline study.     She presents today and is on ASA 81 mg and Lipitor 40 mg.  She is tolerating medications well.  She does have a history of carpal tunnel.  She denies any recurrent unilateral weakness, speech difficulty, vision changes, headaches or any new stroke/TIA symptoms.  She states her migraines have resolved for the most part following her stroke.  She denies symptoms of sleep apnea.  She states her blood pressure is usually low to normal range.  She denies any changes in her health since her last visit.  She works full-time from home for " [employer].    She is smoking (20-year history), social alcohol use.    Review of Systems:Review of Systems   Constitutional: Negative for activity change, appetite change and fatigue.   HENT: Negative for ear pain, facial swelling and trouble swallowing.    Eyes: Negative for photophobia, pain and visual disturbance.   Respiratory: Negative for choking, chest tightness and shortness of breath.    Cardiovascular: Negative for chest pain, palpitations and leg swelling.   Gastrointestinal: Negative for abdominal pain, constipation and nausea.   Endocrine: Negative for polydipsia, polyphagia and polyuria.   Genitourinary: Negative for difficulty urinating, frequency and urgency.   Musculoskeletal: Negative for back pain, gait problem and neck pain.   Skin: Negative for color change, rash and wound.   Allergic/Immunologic: Negative for environmental allergies, food allergies and immunocompromised state.   Neurological: Negative for dizziness, tremors, seizures, syncope, facial asymmetry, speech difficulty, weakness, light-headedness, numbness and headaches.   Hematological: Negative for adenopathy. Does not bruise/bleed easily.   Psychiatric/Behavioral: Negative for agitation, behavioral problems, confusion, decreased concentration, dysphoric mood, hallucinations, self-injury, sleep disturbance and suicidal ideas. The patient is not nervous/anxious and is not hyperactive.        Current Medications:   Current Outpatient Medications:   •  aspirin 81 MG EC tablet, Take 81 mg by mouth Daily., Disp: , Rfl:   •  atorvastatin (LIPITOR) 40 MG tablet, Take 1 tablet by mouth Every Night., Disp: 30 tablet, Rfl: 1  •  cetirizine (zyrTEC) 10 MG tablet, Take 10 mg by mouth Daily As Needed for Allergies., Disp: , Rfl:   •  ibuprofen (ADVIL,MOTRIN) 200 MG tablet, Take 200 mg by mouth As Needed. STOPPED PREOP, LAST DOSE 6/3/18, Disp: , Rfl:   •  Multiple Vitamin (MULTI-VITAMIN DAILY PO), Take 1 tablet by mouth Daily. STOPPED PREOP, LAST  DOSE 6/5/18, Disp: , Rfl:   •  vitamin B-12 (CYANOCOBALAMIN) 1000 MCG tablet, Take 1,000 mcg by mouth Daily., Disp: , Rfl:     The following portions of the patient's history were reviewed and updated as appropriate: allergies, current medications, past family history, past medical history, past social history, past surgical history and problem list.    OBJECTIVE  Vitals:    02/14/19 0939   BP: 114/62   Pulse: 80   SpO2: 99%       Diagnostics:  CT head 10/29/17: No acute findingds  MRI brain 10/29/17: DWI positive for left frontal lobe cortical infarct  MRA head/neck 10/29/17: A large aneurysm of the supraclinoid segment of the left intracranial  carotid artery, appears to can contain thrombus with suggestion of  strand-like extension into the clinoid segment and proximal left MCA,  associated narrowing of the lumen by about 50%.    TTE 10/29/17: LVEF 70%, normal size; RV mildly dilated; LA borderline dilated, size, saline study is moderately positive with Valsalva, suggesting an atrial level shunt; RA normal size    EKG 6/6/18: SR  ADEEL 10/5/18: Interpretation Summary   · Left ventricular systolic function is normal. Estimated EF appears to be in the range of 61 - 65%  · Normal right ventricular systolic function noted. Right ventricular cavity is mildly dilated.  · Left atrial cavity size is mildly dilated.  · There is a secundum ASD with left to right flow. The ASD is near the lip of the aortic root, and measures up to 0.638 cm. The agitated saline study shows prominent negative contrast from the left to right flow, as well as a small amount of bubbles crossing into the left atrium with inspiration  · Mild tricuspid valve regurgitation is present.  · Calculated right ventricular systolic pressure from tricuspid regurgitation is 26 mmHg.  · There is no evidence of pericardial effusion.       Laboratory Results:         Lab Results   Component Value Date    WBC 6.29 11/06/2018    HGB 8.5 (L) 11/09/2018    HCT 25 (L)  11/09/2018    MCV 77.3 (L) 11/06/2018     11/06/2018     Lab Results   Component Value Date    GLUCOSE 102 (H) 11/06/2018    BUN 9 11/06/2018    CREATININE 0.72 11/06/2018    EGFRIFNONA 88 11/06/2018    BCR 12.5 11/06/2018    K 4.3 11/06/2018    CO2 21.0 (L) 11/06/2018    CALCIUM 9.1 11/06/2018    ALBUMIN 4.10 06/06/2018    AST 13 06/06/2018    ALT 11 06/06/2018     Lab Results   Component Value Date    HGBA1C 4.47 (L) 10/29/2017     Lab Results   Component Value Date    CHOL 179 10/29/2017     Lab Results   Component Value Date    HDL 94 (H) 10/29/2017     Lab Results   Component Value Date    LDL 72 10/29/2017     Lab Results   Component Value Date    TRIG 64 10/29/2017     No results found for: RPR  Lab Results   Component Value Date    TSH 2.830 06/06/2018     Lab Results   Component Value Date    GKTTALUT81 189 (L) 10/29/2017       Physical Examination:   General Appearance:   Well developed, overweight, well groomed, alert, and cooperative.  HEENT: Normocephalic.    Neck and Spine: Normal range of motion.  Normal alignment. No mass or tenderness.   Cardiac: Regular rate and rhythm. No murmurs.  Peripheral Vasculature: Radial pulses are equal and symmetric. No signs of distal embolization.  Extremities:    No edema or deformities. Normal joint ROM.  Skin:    No rashes or birth marks.    Neurological examination:  Higher Integrative  Function: Oriented to time, place and person. Normal registration, recall (3/3), attention span and concentration. Normal language including comprehension, spontaneous speech, repetition, reading, writing, naming and vocabulary. No neglect with normal visual-spatial function and construction. Normal fund of knowledge and higher integrative function.  CN II: Pupils are equal, round, and reactive to light. Normal visual acuity and visual fields.    CN III IV VI: Extraocular movements are full without nystagmus.   CN V: Normal facial sensation and strength of muscles of  mastication.  CN VII: Facial movements are symmetric. No weakness.  CN VIII:   Auditory acuity is normal.  CN IX & X:   Symmetric palatal movement.  CN XI: Sternocleidomastoid and trapezius are normal.  No weakness.  CN XII:   The tongue is midline.  No atrophy or fasciculations.  Motor: Normal muscle strength, bulk and tone in upper and lower extremities.  No fasciculations, rigidity, spasticity, or abnormal movements.  Reflexes: 2+ in the upper and lower extremities.   Sensation: Normal to light touch, vibration, and proprioception in arms and legs. Normal graphesthesia and no extinction on DSS.  Station and Gait: Normal gait and station.    Coordination: Finger to nose test shows no dysmetria. Heel to shin normal.    Impression:  Ms. Mack is doing well following her left MCA infarct she suffered in October 2017.  Etiology of her event was likely due to thrombosis of aneurysm that was treated by Dr. Day. However, a cardioembolic/paradoxical source could not be ruled out given her PFO/ASD and she had closure device placement in Nov. 2018.  She has since been maintained on ASA and statin therapy with no recurrent or new stroke/TIA symptoms. We discussed her personal risk factors for stroke and the importance of risk factor control for stroke prevention, including BP and cholesterol control. She will monitor BP regularly. Will check lipid panel today, adjust Lipitor accordingly. Continue ASA. We discussed importance of calling 911 for any signs/symptoms of stroke. F/U in one year, sooner if symptoms warrant.    Plan:    Continue ASA, Lipitor 40 mg, B12 replacement  Check lipid panel today  Consider eval for sleep apnea if approriate  Encouraged regular physical activity  Secondary stroke prevention: Ideal targets for stroke prevention would be Blood pressure < 130/80; B12 > 500 TSH in normal range and LDL < 70; HbA1c < 6.5 and smoking cessation if applicable.    Call 911 for stroke symptoms  Follow-up in one  year    I spent 30 minutes face to face with patient, with  > 50% spent counseling patient regarding diagnosis, review of diagnostics, personal risk factors for stroke and importance of risk factor control for stroke prevention.     Mariaelena was seen today for stroke.    Diagnoses and all orders for this visit:    Left middle cerebral artery stroke (CMS/HCC)    Hyperlipidemia LDL goal <70  -     Lipid Panel; Future    B12 deficiency    History of migraine    Hx of cerebral aneurysm repair        Coding

## 2019-02-15 DIAGNOSIS — E78.5 HYPERLIPIDEMIA LDL GOAL <70: Primary | ICD-10-CM

## 2019-02-15 RX ORDER — ATORVASTATIN CALCIUM 40 MG/1
20 TABLET, FILM COATED ORAL NIGHTLY
Qty: 30 TABLET | Refills: 1 | Status: SHIPPED | OUTPATIENT
Start: 2019-02-15 | End: 2019-03-25 | Stop reason: SDUPTHER

## 2019-02-18 ENCOUNTER — TELEPHONE (OUTPATIENT)
Dept: NEUROLOGY | Facility: CLINIC | Age: 44
End: 2019-02-18

## 2019-02-18 NOTE — TELEPHONE ENCOUNTER
----- Message from GRIS Eugene sent at 2/15/2019  6:09 PM EST -----  Can you let patient know that she can decreased her Lipitor to 20 mg (she can take half of her 40 mg tabs). She will need a f/u lipid panel in 6-8 weeks to reassess. Thanks.

## 2019-02-18 NOTE — TELEPHONE ENCOUNTER
Called and notified patient that she can decrease the Lipitor to 20 mg daily. Notified patient to have a Lipid panel in 6-8 weeks. Patient states understanding all the above

## 2019-03-26 RX ORDER — ATORVASTATIN CALCIUM 20 MG/1
20 TABLET, FILM COATED ORAL NIGHTLY
Qty: 90 TABLET | Refills: 3 | Status: SHIPPED | OUTPATIENT
Start: 2019-03-26 | End: 2020-06-01 | Stop reason: SDUPTHER

## 2019-05-10 ENCOUNTER — APPOINTMENT (OUTPATIENT)
Dept: WOMENS IMAGING | Facility: HOSPITAL | Age: 44
End: 2019-05-10

## 2019-05-10 PROCEDURE — 77063 BREAST TOMOSYNTHESIS BI: CPT | Performed by: RADIOLOGY

## 2019-05-10 PROCEDURE — 77067 SCR MAMMO BI INCL CAD: CPT | Performed by: RADIOLOGY

## 2019-06-24 ENCOUNTER — APPOINTMENT (OUTPATIENT)
Dept: PREADMISSION TESTING | Facility: HOSPITAL | Age: 44
End: 2019-06-24

## 2019-06-24 VITALS
SYSTOLIC BLOOD PRESSURE: 92 MMHG | TEMPERATURE: 98.5 F | HEART RATE: 68 BPM | HEIGHT: 66 IN | WEIGHT: 236 LBS | BODY MASS INDEX: 37.93 KG/M2 | OXYGEN SATURATION: 99 % | DIASTOLIC BLOOD PRESSURE: 61 MMHG | RESPIRATION RATE: 16 BRPM

## 2019-06-24 LAB
ANION GAP SERPL CALCULATED.3IONS-SCNC: 10.9 MMOL/L
BASOPHILS # BLD AUTO: 0.04 10*3/MM3 (ref 0–0.2)
BASOPHILS NFR BLD AUTO: 0.8 % (ref 0–1.5)
BUN BLD-MCNC: 9 MG/DL (ref 6–20)
BUN/CREAT SERPL: 13.8 (ref 7–25)
CALCIUM SPEC-SCNC: 8.9 MG/DL (ref 8.6–10.5)
CHLORIDE SERPL-SCNC: 105 MMOL/L (ref 98–107)
CO2 SERPL-SCNC: 22.1 MMOL/L (ref 22–29)
CREAT BLD-MCNC: 0.65 MG/DL (ref 0.57–1)
DEPRECATED RDW RBC AUTO: 63.5 FL (ref 37–54)
EOSINOPHIL # BLD AUTO: 0.06 10*3/MM3 (ref 0–0.4)
EOSINOPHIL NFR BLD AUTO: 1.1 % (ref 0.3–6.2)
ERYTHROCYTE [DISTWIDTH] IN BLOOD BY AUTOMATED COUNT: 19.2 % (ref 12.3–15.4)
GFR SERPL CREATININE-BSD FRML MDRD: 99 ML/MIN/1.73
GLUCOSE BLD-MCNC: 93 MG/DL (ref 65–99)
HCG SERPL QL: NEGATIVE
HCT VFR BLD AUTO: 33.9 % (ref 34–46.6)
HGB BLD-MCNC: 9.9 G/DL (ref 12–15.9)
IMM GRANULOCYTES # BLD AUTO: 0.02 10*3/MM3 (ref 0–0.05)
IMM GRANULOCYTES NFR BLD AUTO: 0.4 % (ref 0–0.5)
LYMPHOCYTES # BLD AUTO: 1.3 10*3/MM3 (ref 0.7–3.1)
LYMPHOCYTES NFR BLD AUTO: 24.9 % (ref 19.6–45.3)
MCH RBC QN AUTO: 26.2 PG (ref 26.6–33)
MCHC RBC AUTO-ENTMCNC: 29.2 G/DL (ref 31.5–35.7)
MCV RBC AUTO: 89.7 FL (ref 79–97)
MONOCYTES # BLD AUTO: 0.43 10*3/MM3 (ref 0.1–0.9)
MONOCYTES NFR BLD AUTO: 8.2 % (ref 5–12)
NEUTROPHILS # BLD AUTO: 3.37 10*3/MM3 (ref 1.7–7)
NEUTROPHILS NFR BLD AUTO: 64.6 % (ref 42.7–76)
NRBC BLD AUTO-RTO: 0 /100 WBC (ref 0–0.2)
PLATELET # BLD AUTO: 331 10*3/MM3 (ref 140–450)
PMV BLD AUTO: 11.9 FL (ref 6–12)
POTASSIUM BLD-SCNC: 4 MMOL/L (ref 3.5–5.2)
RBC # BLD AUTO: 3.78 10*6/MM3 (ref 3.77–5.28)
SODIUM BLD-SCNC: 138 MMOL/L (ref 136–145)
WBC NRBC COR # BLD: 5.22 10*3/MM3 (ref 3.4–10.8)

## 2019-06-24 PROCEDURE — 85025 COMPLETE CBC W/AUTO DIFF WBC: CPT | Performed by: OBSTETRICS & GYNECOLOGY

## 2019-06-24 PROCEDURE — 36415 COLL VENOUS BLD VENIPUNCTURE: CPT

## 2019-06-24 PROCEDURE — 84703 CHORIONIC GONADOTROPIN ASSAY: CPT | Performed by: OBSTETRICS & GYNECOLOGY

## 2019-06-24 PROCEDURE — 80048 BASIC METABOLIC PNL TOTAL CA: CPT | Performed by: OBSTETRICS & GYNECOLOGY

## 2019-06-24 RX ORDER — PNV NO.95/FERROUS FUM/FOLIC AC 28MG-0.8MG
TABLET ORAL
COMMUNITY
End: 2020-06-18

## 2019-06-26 ENCOUNTER — HOSPITAL ENCOUNTER (OUTPATIENT)
Facility: HOSPITAL | Age: 44
Setting detail: HOSPITAL OUTPATIENT SURGERY
Discharge: HOME | End: 2019-06-26
Attending: OBSTETRICS & GYNECOLOGY | Admitting: OBSTETRICS & GYNECOLOGY

## 2019-06-26 ENCOUNTER — ANESTHESIA (OUTPATIENT)
Dept: PERIOP | Facility: HOSPITAL | Age: 44
End: 2019-06-26

## 2019-06-26 ENCOUNTER — ANESTHESIA EVENT (OUTPATIENT)
Dept: PERIOP | Facility: HOSPITAL | Age: 44
End: 2019-06-26

## 2019-06-26 VITALS
WEIGHT: 231.48 LBS | HEART RATE: 78 BPM | TEMPERATURE: 97.9 F | BODY MASS INDEX: 37.36 KG/M2 | DIASTOLIC BLOOD PRESSURE: 82 MMHG | RESPIRATION RATE: 16 BRPM | OXYGEN SATURATION: 100 % | SYSTOLIC BLOOD PRESSURE: 123 MMHG

## 2019-06-26 DIAGNOSIS — N93.9 ABNORMAL UTERINE BLEEDING: ICD-10-CM

## 2019-06-26 PROCEDURE — 25010000002 KETOROLAC TROMETHAMINE PER 15 MG: Performed by: ANESTHESIOLOGY

## 2019-06-26 PROCEDURE — C1782 MORCELLATOR: HCPCS | Performed by: OBSTETRICS & GYNECOLOGY

## 2019-06-26 PROCEDURE — 25010000002 MIDAZOLAM PER 1 MG: Performed by: ANESTHESIOLOGY

## 2019-06-26 PROCEDURE — 25010000002 FENTANYL CITRATE (PF) 100 MCG/2ML SOLUTION: Performed by: ANESTHESIOLOGY

## 2019-06-26 PROCEDURE — 25010000002 ONDANSETRON PER 1 MG: Performed by: ANESTHESIOLOGY

## 2019-06-26 PROCEDURE — 25010000002 PROPOFOL 10 MG/ML EMULSION: Performed by: ANESTHESIOLOGY

## 2019-06-26 PROCEDURE — 88305 TISSUE EXAM BY PATHOLOGIST: CPT | Performed by: OBSTETRICS & GYNECOLOGY

## 2019-06-26 PROCEDURE — 25010000002 DEXAMETHASONE PER 1 MG: Performed by: ANESTHESIOLOGY

## 2019-06-26 RX ORDER — SODIUM CHLORIDE, SODIUM LACTATE, POTASSIUM CHLORIDE, CALCIUM CHLORIDE 600; 310; 30; 20 MG/100ML; MG/100ML; MG/100ML; MG/100ML
9 INJECTION, SOLUTION INTRAVENOUS CONTINUOUS
Status: DISCONTINUED | OUTPATIENT
Start: 2019-06-26 | End: 2019-06-26 | Stop reason: HOSPADM

## 2019-06-26 RX ORDER — HYDROMORPHONE HYDROCHLORIDE 1 MG/ML
0.5 INJECTION, SOLUTION INTRAMUSCULAR; INTRAVENOUS; SUBCUTANEOUS
Status: DISCONTINUED | OUTPATIENT
Start: 2019-06-26 | End: 2019-06-26 | Stop reason: HOSPADM

## 2019-06-26 RX ORDER — ONDANSETRON 2 MG/ML
4 INJECTION INTRAMUSCULAR; INTRAVENOUS ONCE AS NEEDED
Status: DISCONTINUED | OUTPATIENT
Start: 2019-06-26 | End: 2019-06-26 | Stop reason: HOSPADM

## 2019-06-26 RX ORDER — LIDOCAINE HYDROCHLORIDE 10 MG/ML
0.5 INJECTION, SOLUTION EPIDURAL; INFILTRATION; INTRACAUDAL; PERINEURAL ONCE AS NEEDED
Status: COMPLETED | OUTPATIENT
Start: 2019-06-26 | End: 2019-06-26

## 2019-06-26 RX ORDER — PROMETHAZINE HYDROCHLORIDE 25 MG/1
25 SUPPOSITORY RECTAL ONCE AS NEEDED
Status: DISCONTINUED | OUTPATIENT
Start: 2019-06-26 | End: 2019-06-26 | Stop reason: HOSPADM

## 2019-06-26 RX ORDER — PROMETHAZINE HYDROCHLORIDE 25 MG/ML
6.25 INJECTION, SOLUTION INTRAMUSCULAR; INTRAVENOUS ONCE AS NEEDED
Status: DISCONTINUED | OUTPATIENT
Start: 2019-06-26 | End: 2019-06-26 | Stop reason: HOSPADM

## 2019-06-26 RX ORDER — LIDOCAINE HYDROCHLORIDE 20 MG/ML
INJECTION, SOLUTION INFILTRATION; PERINEURAL AS NEEDED
Status: DISCONTINUED | OUTPATIENT
Start: 2019-06-26 | End: 2019-06-26 | Stop reason: SURG

## 2019-06-26 RX ORDER — FLUMAZENIL 0.1 MG/ML
0.2 INJECTION INTRAVENOUS AS NEEDED
Status: DISCONTINUED | OUTPATIENT
Start: 2019-06-26 | End: 2019-06-26 | Stop reason: HOSPADM

## 2019-06-26 RX ORDER — PROPOFOL 10 MG/ML
VIAL (ML) INTRAVENOUS AS NEEDED
Status: DISCONTINUED | OUTPATIENT
Start: 2019-06-26 | End: 2019-06-26 | Stop reason: SURG

## 2019-06-26 RX ORDER — CLINDAMYCIN PHOSPHATE 900 MG/50ML
900 INJECTION INTRAVENOUS ONCE
Status: COMPLETED | OUTPATIENT
Start: 2019-06-26 | End: 2019-06-26

## 2019-06-26 RX ORDER — HYDROCODONE BITARTRATE AND ACETAMINOPHEN 7.5; 325 MG/1; MG/1
1 TABLET ORAL ONCE AS NEEDED
Status: COMPLETED | OUTPATIENT
Start: 2019-06-26 | End: 2019-06-26

## 2019-06-26 RX ORDER — DEXAMETHASONE SODIUM PHOSPHATE 10 MG/ML
INJECTION INTRAMUSCULAR; INTRAVENOUS AS NEEDED
Status: DISCONTINUED | OUTPATIENT
Start: 2019-06-26 | End: 2019-06-26 | Stop reason: SURG

## 2019-06-26 RX ORDER — FENTANYL CITRATE 50 UG/ML
INJECTION, SOLUTION INTRAMUSCULAR; INTRAVENOUS AS NEEDED
Status: DISCONTINUED | OUTPATIENT
Start: 2019-06-26 | End: 2019-06-26 | Stop reason: SURG

## 2019-06-26 RX ORDER — OXYCODONE AND ACETAMINOPHEN 7.5; 325 MG/1; MG/1
1 TABLET ORAL EVERY 4 HOURS PRN
Status: DISCONTINUED | OUTPATIENT
Start: 2019-06-26 | End: 2019-06-26 | Stop reason: HOSPADM

## 2019-06-26 RX ORDER — ONDANSETRON 2 MG/ML
INJECTION INTRAMUSCULAR; INTRAVENOUS AS NEEDED
Status: DISCONTINUED | OUTPATIENT
Start: 2019-06-26 | End: 2019-06-26 | Stop reason: SURG

## 2019-06-26 RX ORDER — LIDOCAINE HYDROCHLORIDE 10 MG/ML
INJECTION, SOLUTION INFILTRATION; PERINEURAL AS NEEDED
Status: DISCONTINUED | OUTPATIENT
Start: 2019-06-26 | End: 2019-06-26 | Stop reason: HOSPADM

## 2019-06-26 RX ORDER — EPHEDRINE SULFATE 50 MG/ML
5 INJECTION, SOLUTION INTRAVENOUS ONCE AS NEEDED
Status: DISCONTINUED | OUTPATIENT
Start: 2019-06-26 | End: 2019-06-26 | Stop reason: HOSPADM

## 2019-06-26 RX ORDER — FAMOTIDINE 10 MG/ML
20 INJECTION, SOLUTION INTRAVENOUS ONCE
Status: COMPLETED | OUTPATIENT
Start: 2019-06-26 | End: 2019-06-26

## 2019-06-26 RX ORDER — FENTANYL CITRATE 50 UG/ML
50 INJECTION, SOLUTION INTRAMUSCULAR; INTRAVENOUS
Status: DISCONTINUED | OUTPATIENT
Start: 2019-06-26 | End: 2019-06-26 | Stop reason: HOSPADM

## 2019-06-26 RX ORDER — SODIUM CHLORIDE 9 MG/ML
INJECTION, SOLUTION INTRAVENOUS AS NEEDED
Status: DISCONTINUED | OUTPATIENT
Start: 2019-06-26 | End: 2019-06-26 | Stop reason: HOSPADM

## 2019-06-26 RX ORDER — MIDAZOLAM HYDROCHLORIDE 1 MG/ML
1 INJECTION INTRAMUSCULAR; INTRAVENOUS
Status: DISCONTINUED | OUTPATIENT
Start: 2019-06-26 | End: 2019-06-26 | Stop reason: HOSPADM

## 2019-06-26 RX ORDER — MAGNESIUM HYDROXIDE 1200 MG/15ML
LIQUID ORAL AS NEEDED
Status: DISCONTINUED | OUTPATIENT
Start: 2019-06-26 | End: 2019-06-26 | Stop reason: HOSPADM

## 2019-06-26 RX ORDER — MIDAZOLAM HYDROCHLORIDE 1 MG/ML
2 INJECTION INTRAMUSCULAR; INTRAVENOUS
Status: DISCONTINUED | OUTPATIENT
Start: 2019-06-26 | End: 2019-06-26 | Stop reason: HOSPADM

## 2019-06-26 RX ORDER — SODIUM CHLORIDE 0.9 % (FLUSH) 0.9 %
1-10 SYRINGE (ML) INJECTION AS NEEDED
Status: DISCONTINUED | OUTPATIENT
Start: 2019-06-26 | End: 2019-06-26 | Stop reason: HOSPADM

## 2019-06-26 RX ORDER — KETOROLAC TROMETHAMINE 30 MG/ML
INJECTION, SOLUTION INTRAMUSCULAR; INTRAVENOUS AS NEEDED
Status: DISCONTINUED | OUTPATIENT
Start: 2019-06-26 | End: 2019-06-26 | Stop reason: SURG

## 2019-06-26 RX ORDER — PROMETHAZINE HYDROCHLORIDE 25 MG/1
25 TABLET ORAL ONCE AS NEEDED
Status: DISCONTINUED | OUTPATIENT
Start: 2019-06-26 | End: 2019-06-26 | Stop reason: HOSPADM

## 2019-06-26 RX ADMIN — FAMOTIDINE 20 MG: 10 INJECTION INTRAVENOUS at 08:20

## 2019-06-26 RX ADMIN — MIDAZOLAM 2 MG: 1 INJECTION INTRAMUSCULAR; INTRAVENOUS at 08:19

## 2019-06-26 RX ADMIN — FENTANYL CITRATE 50 MCG: 50 INJECTION INTRAMUSCULAR; INTRAVENOUS at 09:25

## 2019-06-26 RX ADMIN — ONDANSETRON 4 MG: 2 INJECTION INTRAMUSCULAR; INTRAVENOUS at 09:26

## 2019-06-26 RX ADMIN — HYDROCODONE BITARTRATE AND ACETAMINOPHEN 1 TABLET: 7.5; 325 TABLET ORAL at 10:19

## 2019-06-26 RX ADMIN — LIDOCAINE HYDROCHLORIDE 0.5 ML: 10 INJECTION, SOLUTION EPIDURAL; INFILTRATION; INTRACAUDAL; PERINEURAL at 08:10

## 2019-06-26 RX ADMIN — KETOROLAC TROMETHAMINE 30 MG: 30 INJECTION, SOLUTION INTRAMUSCULAR; INTRAVENOUS at 09:26

## 2019-06-26 RX ADMIN — FENTANYL CITRATE 50 MCG: 50 INJECTION INTRAMUSCULAR; INTRAVENOUS at 09:18

## 2019-06-26 RX ADMIN — DEXAMETHASONE SODIUM PHOSPHATE 8 MG: 10 INJECTION INTRAMUSCULAR; INTRAVENOUS at 09:26

## 2019-06-26 RX ADMIN — CLINDAMYCIN PHOSPHATE 900 MG: 900 INJECTION INTRAVENOUS at 09:16

## 2019-06-26 RX ADMIN — LIDOCAINE HYDROCHLORIDE 60 MG: 20 INJECTION, SOLUTION INFILTRATION; PERINEURAL at 09:17

## 2019-06-26 RX ADMIN — SODIUM CHLORIDE, POTASSIUM CHLORIDE, SODIUM LACTATE AND CALCIUM CHLORIDE 9 ML/HR: 600; 310; 30; 20 INJECTION, SOLUTION INTRAVENOUS at 08:10

## 2019-06-26 RX ADMIN — PROPOFOL 200 MG: 10 INJECTION, EMULSION INTRAVENOUS at 09:20

## 2019-06-26 NOTE — ANESTHESIA POSTPROCEDURE EVALUATION
Patient: Mariaelena Mack    Procedure Summary     Date:  06/26/19 Room / Location:   [facility]  [facility]    Anesthesia Start:  0916 Anesthesia Stop:  1002    Procedure:  DILATATION AND CURETTAGE, HYSTEROSCOPY =, MYOMECTOMY USING MYOSURE (N/A Vagina) Diagnosis:      Surgeon:  Soraida Jay MD Provider:      Anesthesia Type:  general ASA Status:  3          Anesthesia Type: general  Last vitals  BP   123/82 (06/26/19 1040)   Temp   36.6 °C (97.9 °F) (06/26/19 1003)   Pulse   78 (06/26/19 1040)   Resp   16 (06/26/19 1040)     SpO2   100 % (06/26/19 1040)     Post Anesthesia Care and Evaluation    Patient location during evaluation: bedside  Patient participation: complete - patient participated  Level of consciousness: awake and alert  Pain management: adequate  Airway patency: patent  Anesthetic complications: No anesthetic complications    Cardiovascular status: acceptable  Respiratory status: acceptable  Hydration status: acceptable    Comments: /82 (BP Location: Left arm, Patient Position: Sitting)   Pulse 78   Temp 36.6 °C (97.9 °F) (Oral)   Resp 16   Wt 105 kg (231 lb 7.7 oz)   LMP 06/10/2019 (Approximate)   SpO2 100%   BMI 37.36 kg/m²

## 2019-06-26 NOTE — ANESTHESIA PROCEDURE NOTES
Airway  Urgency: elective    Date/Time: 6/26/2019 9:22 AM  Airway not difficult    General Information and Staff    Patient location during procedure: OR  Anesthesiologist: Lamonte Cedeno MD    Indications and Patient Condition  Indications for airway management: airway protection    Preoxygenated: yes  Mask difficulty assessment: 1 - vent by mask    Final Airway Details  Final airway type: supraglottic airway      Successful airway: classic  Size 4    Number of attempts at approach: 1    Additional Comments  Pre oxygenated  Easy mask vent  Atraumatic lma placement  +etco2

## 2019-06-26 NOTE — ANESTHESIA PREPROCEDURE EVALUATION
Anesthesia Evaluation     Patient summary reviewed and Nursing notes reviewed   NPO Solid Status: > 8 hours  NPO Liquid Status: > 2 hours           Airway   Mallampati: II  no difficulty expected  Dental - normal exam     Pulmonary     breath sounds clear to auscultation  Cardiovascular     ECG reviewed  Rhythm: regular  Rate: normal    (+) PVD,  carotid artery disease    ROS comment: Sp ASD with closure    Neuro/Psych  (+) CVA, headaches, numbness, psychiatric history,       ROS Comment: Carotid anueryasm repaired  GI/Hepatic/Renal/Endo      Musculoskeletal     Abdominal    Substance History      OB/GYN          Other                        Anesthesia Plan    ASA 3     general     intravenous induction   Anesthetic plan, all risks, benefits, and alternatives have been provided, discussed and informed consent has been obtained with: patient.    No residual

## 2019-06-27 LAB
CYTO UR: NORMAL
LAB AP CASE REPORT: NORMAL
PATH REPORT.FINAL DX SPEC: NORMAL
PATH REPORT.GROSS SPEC: NORMAL

## 2019-11-18 ENCOUNTER — OFFICE VISIT (OUTPATIENT)
Dept: CARDIOLOGY | Facility: CLINIC | Age: 44
End: 2019-11-18

## 2019-11-18 VITALS
HEIGHT: 66 IN | OXYGEN SATURATION: 99 % | SYSTOLIC BLOOD PRESSURE: 120 MMHG | WEIGHT: 237 LBS | BODY MASS INDEX: 38.09 KG/M2 | HEART RATE: 82 BPM | DIASTOLIC BLOOD PRESSURE: 68 MMHG

## 2019-11-18 DIAGNOSIS — I72.0 CAROTID ARTERY ANEURYSM (HCC): Primary | ICD-10-CM

## 2019-11-18 DIAGNOSIS — I63.132 CEREBROVASCULAR ACCIDENT (CVA) DUE TO EMBOLISM OF LEFT CAROTID ARTERY (HCC): ICD-10-CM

## 2019-11-18 DIAGNOSIS — Z87.74 HISTORY OF PERCUTANEOUS TRANSCATHETER CLOSURE OF CONGENITAL ASD: ICD-10-CM

## 2019-11-18 DIAGNOSIS — I51.7 RIGHT VENTRICULAR ENLARGEMENT: ICD-10-CM

## 2019-11-18 PROCEDURE — 99213 OFFICE O/P EST LOW 20 MIN: CPT | Performed by: INTERNAL MEDICINE

## 2019-11-18 NOTE — PROGRESS NOTES
Date of Office Visit: 2019  Encounter Provider: William Patino MD  Place of Service: [facility] CARDIOLOGY  Patient Name: Mariaelena Mack  :1975    Chief complaint: Follow-up for left ICA  aneurysm with partial thrombosis, left MCA   CVA, secundum ASD status post closure, and right ventricular enlargement.    History of Present Illness:    I again had the pleasure of seeing the patient in cardiology office on 2019.  She is a   very pleasant 44 year-old white female with a past medical history significant for a left   internal carotid artery aneurysm, left MCA CVA, and atrial shunt who presents for   follow-up.       I initially saw the patient in the hospital on 10/30/2017.  She had been admitted with   headache, speech impairment, and right-sided facial weakness and numbness.  She   was ultimately found to have a partially thrombosed left internal carotid artery aneurysm.    She also had a left MCA distribution CVA with a nonocclusive proximal thrombus, from   the left internal carotid artery aneurysm.  She ultimately underwent a cerebral   angiogram and embolization of the aneurysm with a Pipeline embolization device and   coils on 2017 by Dr. Day.  While hospitalized, she did have an echocardiogram   performed on 10/29/2017 which showed an ejection fraction of 60-65%, normal   diastolic function, mildly enlarged right ventricle, and a moderately positive saline study   suggestive of an atrial level shunt.     The patient eventually underwent a transesophageal echocardiogram on 10/5/2018.    This showed a secundum ASD with left to right flow.  The agitated saline study   showed prominent negative contrast from the left to right flow, as well as a small   amount of bubbles crossing of the left atrium with inspiration.  Her right ventricle was   again confirmed to be mildly dilated.  The patient ultimately underwent closure of the   ASD on 2018  by Dr. Greene.  The closure device utilized was a 10 mm Amplatzer   septal occluder device.     The patient presents today for follow-up.  Overall, she has been doing well.  She only   gets short of breath with excessive exertion, which is not changed.  She has had no   chest pain or new neurological symptoms.  Her blood pressure today is excellent   120/68.    Past Medical History:   Diagnosis Date   • Anxiety     WITH MEDICAL HX, NO MEDS   • ASD (atrial septal defect)     Status post closure with a 10 mm Amplatzer septal occluder device by Dr. Greene on 11/9/2018.   • Bulging of thoracic intervertebral disc     over 20 yrs ago   • Headache, tension-type    • History of anemia     IRON INFUSIONS 2/2018   • History of transfusion    • Internal carotid aneurysm     Left ICA aneurysm with partial thrombosis 10/29/17 - resulting in left MCA CVA.   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   • Migraine    • Right ventricular enlargement    • Sciatica of left side    • Stroke (CMS/HCC)    • Tattoos    • Uterine fibroid        Past Surgical History:   Procedure Laterality Date   • CARDIAC CATHETERIZATION N/A 11/9/2018    Procedure: Septal Defect Closure;  Surgeon: Kadeem Greene MD;  Location: [facility] CATH INVASIVE LOCATION;  Service: Cardiology   • CEREBRAL ANGIOGRAM N/A 6/11/2018    Procedure: CEREBRAL ANGIOGRAM;  Surgeon: Roger Day MD;  Location: [facility] OR 18/19;  Service: Neurosurgery   • D&C HYSTEROSCOPY ENDOMETRIAL ABLATION N/A 6/26/2019    Procedure: DILATATION AND CURETTAGE, HYSTEROSCOPY =, MYOMECTOMY USING MYOSURE;  Surgeon: Soraida Jay MD;  Location: [facility] OR [facility];  Service: Obstetrics/Gynecology   • EMBOLIZATION CEREBRAL N/A 11/2/2017    Procedure: Cerebral angiogram and embolization of cerebral angiogram with Pipeline Embolization Device and coils.;  Surgeon: Roger Day MD;  Location: [facility] OR 18/19;  Service:    • PATENT FORAMEN OVALE  CLOSURE         Current Outpatient Medications on File Prior to Visit   Medication Sig Dispense Refill   • aspirin 81 MG EC tablet Take 81 mg by mouth Daily.     • atorvastatin (LIPITOR) 20 MG tablet Take 1 tablet by mouth Every Night. 90 tablet 3   • cetirizine (zyrTEC) 10 MG tablet Take 10 mg by mouth Daily As Needed for Allergies.     • Ferrous Sulfate (IRON) 325 (65 Fe) MG tablet Take  by mouth. HOLD PRIOR TO SURGERY     • ibuprofen (ADVIL,MOTRIN) 200 MG tablet Take 200 mg by mouth As Needed. HOLD PRIOR TO SURGERY     • Multiple Vitamin (MULTI-VITAMIN DAILY PO) Take 1 tablet by mouth Daily. HOLD PRIOR TO SURGERY     • vitamin B-12 (CYANOCOBALAMIN) 1000 MCG tablet Take 1,000 mcg by mouth Daily. AS DIRECTED:  AM & PM DAY BEFORE SURGERY  AM DAY OF SURGERY       No current facility-administered medications on file prior to visit.      Allergies as of 2019   • (No Known Allergies)     Social History     Socioeconomic History   • Marital status:      Spouse name: Silvestre   • Number of children: 1   • Years of education: College   • Highest education level: Not on file   Occupational History   • Occupation:      Employer: [employer]     Comment: full-time   Tobacco Use   • Smoking status: Former Smoker     Packs/day: 1.00     Years: 21.00     Pack years: 21.00     Types: Electronic Cigarette, Cigarettes     Last attempt to quit: 2015     Years since quittin.8   • Smokeless tobacco: Never Used   • Tobacco comment: uses Vaporizor   Substance and Sexual Activity   • Alcohol use: Yes     Alcohol/week: 12.0 oz     Types: 20 Cans of beer per week     Comment: weekends   • Drug use: No   • Sexual activity: Yes     Partners: Male     Birth control/protection: None   Social History Narrative    Patient lives with  and socially drinks fairly heavily most weekends. States 6-8 beers on weekend nights. Less on weekdays.     Family History   Problem Relation Age of Onset   •  "Migraines Mother    • Heart failure Mother    • Stroke Mother    • Stroke Maternal Grandmother    • Diabetes Maternal Grandmother    • Heart disease Father    • Cancer Sister    • Stroke Sister    • Breast cancer Sister 54   • Bone cancer Sister 54   • Diabetes Brother    • Leukemia Paternal Uncle    • Malig Hyperthermia Neg Hx        Review of Systems   All other systems reviewed and are negative.     Objective:     Vitals:    11/18/19 1018   BP: 120/68   Pulse: 82   SpO2: 99%   Weight: 108 kg (237 lb)   Height: 167.6 cm (65.98\")     Body mass index is 38.27 kg/m².    Physical Exam   Constitutional: She is oriented to person, place, and time. She appears well-developed and well-nourished.   HENT:   Head: Normocephalic and atraumatic.   Eyes: Conjunctivae are normal.   Neck: Neck supple.   Cardiovascular: Normal rate and regular rhythm. Exam reveals no gallop and no friction rub.   No murmur heard.  Pulmonary/Chest: Effort normal and breath sounds normal.   Abdominal: Soft. There is no tenderness.   Musculoskeletal: She exhibits no edema.   Neurological: She is alert and oriented to person, place, and time.   Skin: Skin is warm.   Psychiatric: She has a normal mood and affect. Her behavior is normal.     Lab Review:   Procedures    Cardiac Procedures:  1.  Echocardiogram on 10/29/2017: Ejection fraction was 60-65%.  There was normal   diastolic function.  The right ventricle was mildly dilated. The saline study was   moderately positive suggestive of an atrial level shunt.  2.  Transesophageal echocardiogram on 10/5/2018: The ejection fraction was 60-65%.    The right ventricle was mildly dilated.  The left atrium was mildly dilated.  There was a   secundum ASD with left to right flow.  There was mild tricuspid regurgitation.  3.  Status post percutaneous ASD closure on 11/9/2018 by Dr. Greene.  The device utilized   was a 10 mm Amplatzer septal occluder device.  4.  Limited echocardiogram on 2/11/2019: Ejection " fraction was 62%.  The ASD closure   device was intact.  Saline tests were negative.  There was mild tricuspid regurgitation.      Assessment:       Diagnosis Plan   1. Carotid artery aneurysm (CMS/HCC)     2. Cerebrovascular accident (CVA) due to embolism of left carotid artery (CMS/HCC)     3. History of percutaneous transcatheter closure of congenital ASD     4. Right ventricular enlargement       Plan:       She seems to be doing excellent from a symptomatic standpoint.  She has had no neurological symptoms, and she is really essentially asymptomatic from a cardiovascular perspective.  Given her history of carotid artery aneurysm thrombosis, I feel that she should be on aspirin for life.  She does also have the ASD occluder device.  This appeared to be intact on her echocardiogram on 2/11/2019.  She should still take antibiotic prophylaxis prior to dental cleanings or dental work for up to 1 year through February 2020.  She will also continue on the Lipitor, and she has had no issues with this.  Her blood pressure is excellent off of medications at 120/68.  I made no new changes today.  I will see her back in the office in 1 year.

## 2020-02-13 ENCOUNTER — OFFICE VISIT (OUTPATIENT)
Dept: NEUROLOGY | Facility: CLINIC | Age: 45
End: 2020-02-13

## 2020-02-13 VITALS
SYSTOLIC BLOOD PRESSURE: 128 MMHG | OXYGEN SATURATION: 95 % | HEIGHT: 66 IN | WEIGHT: 226 LBS | HEART RATE: 118 BPM | BODY MASS INDEX: 36.32 KG/M2 | DIASTOLIC BLOOD PRESSURE: 82 MMHG

## 2020-02-13 DIAGNOSIS — Z86.79 HX OF CEREBRAL ANEURYSM REPAIR: ICD-10-CM

## 2020-02-13 DIAGNOSIS — E66.09 CLASS 2 OBESITY DUE TO EXCESS CALORIES WITH BODY MASS INDEX (BMI) OF 36.0 TO 36.9 IN ADULT, UNSPECIFIED WHETHER SERIOUS COMORBIDITY PRESENT: ICD-10-CM

## 2020-02-13 DIAGNOSIS — E53.8 B12 DEFICIENCY: ICD-10-CM

## 2020-02-13 DIAGNOSIS — Z98.890 HX OF CEREBRAL ANEURYSM REPAIR: ICD-10-CM

## 2020-02-13 DIAGNOSIS — Z87.74 HX OF PERCUTANEOUS TRANSCATHETER CLOSURE OF CONGENITAL ASD: ICD-10-CM

## 2020-02-13 DIAGNOSIS — I63.512 LEFT MIDDLE CEREBRAL ARTERY STROKE (HCC): Primary | ICD-10-CM

## 2020-02-13 PROBLEM — E66.9 CLASS 2 OBESITY IN ADULT: Status: ACTIVE | Noted: 2020-02-13

## 2020-02-13 PROBLEM — E66.812 CLASS 2 OBESITY IN ADULT: Status: ACTIVE | Noted: 2020-02-13

## 2020-02-13 PROCEDURE — 99213 OFFICE O/P EST LOW 20 MIN: CPT | Performed by: NURSE PRACTITIONER

## 2020-02-13 NOTE — PROGRESS NOTES
DOS: 2020  NAME: Mariaelena Mack   : 1975  PCP: Provider, No Known    Chief Complaint   Patient presents with   • Stroke      SUBJECTIVE  Neurological Problem:  45 y.o. RHW female with h/o LMCA stroke, large left ICA aneurysm (s/p embolization),  B12 def and h/o migraines,  and tobacco abuse who presents today for stroke follow-up. She is unaccompanied.    Interval History:   **For full history, please see progress note dated 2019.    Ms. Mack has a h/o left MCA infarct she suffered in 2017, etiology likely due to thrombosis of aneurysm that was treated by Dr. Day. However, a cardioembolic/paradoxical source could not be ruled out given the finding of PFO/ASD and she had closure device placement in 2018.  She has since been maintained on ASA and Lipitor with no recurrent or new stroke/TIA symptoms.  She has essentially returned to her neurologic baseline he has no residuals.      She continues on B12 supplements.  Her migraines have improved since her PFO closure, only has 1 to 2/year now.  She denies any changes in her health since her last visit.  She does not have a PCP currently and has had no repeat lab work.  She does continue to follow up with cardiology.  She denies any signs or symptoms of sleep apnea. She denies smoking cigarettes but does report vaping on occassion.    Last seen by neurosurgery in , review of notes indicates she plans for repeat CTA head for continued aneurysm surveillance in 2021.    Review of Systems:Review of Systems   Constitutional: Negative for activity change, appetite change and fatigue.   HENT: Negative for ear pain, facial swelling and trouble swallowing.    Eyes: Negative for photophobia, pain and visual disturbance.   Musculoskeletal: Negative for back pain, gait problem and neck pain.   Allergic/Immunologic: Negative for environmental allergies, food allergies and immunocompromised state.   Neurological: Positive for numbness  "(left hand) and headaches (2 major migraines with \"flashers\"). Negative for dizziness, tremors, seizures, syncope, facial asymmetry, speech difficulty, weakness and light-headedness.   Hematological: Negative for adenopathy. Does not bruise/bleed easily.   Psychiatric/Behavioral: Negative for agitation, behavioral problems, confusion, decreased concentration, dysphoric mood, hallucinations, self-injury, sleep disturbance and suicidal ideas. The patient is nervous/anxious. The patient is not hyperactive.     Above ROS reviewed    The following portions of the patient's history were reviewed and updated as appropriate: allergies, current medications, past family history, past medical history, past social history, past surgical history and problem list.    Current Medications:   Current Outpatient Medications:   •  aspirin 81 MG EC tablet, Take 81 mg by mouth Daily., Disp: , Rfl:   •  atorvastatin (LIPITOR) 20 MG tablet, Take 1 tablet by mouth Every Night., Disp: 90 tablet, Rfl: 3  •  cetirizine (zyrTEC) 10 MG tablet, Take 10 mg by mouth Daily As Needed for Allergies., Disp: , Rfl:   •  ibuprofen (ADVIL,MOTRIN) 200 MG tablet, Take 200 mg by mouth As Needed. HOLD PRIOR TO SURGERY, Disp: , Rfl:   •  Multiple Vitamin (MULTI-VITAMIN DAILY PO), Take 1 tablet by mouth Daily. HOLD PRIOR TO SURGERY, Disp: , Rfl:   •  vitamin B-12 (CYANOCOBALAMIN) 1000 MCG tablet, Take 1,000 mcg by mouth Daily. AS DIRECTED: AM & PM DAY BEFORE SURGERY AM DAY OF SURGERY, Disp: , Rfl:   •  Ferrous Sulfate (IRON) 325 (65 Fe) MG tablet, Take  by mouth. HOLD PRIOR TO SURGERY, Disp: , Rfl:       OBJECTIVE  Vitals:    02/13/20 0947   BP: 128/82   Pulse: 118   SpO2: 95%     Body mass index is 36.5 kg/m².    Diagnostics:    Laboratory Results:         Lab Results   Component Value Date    WBC 5.22 06/24/2019    HGB 9.9 (L) 06/24/2019    HCT 33.9 (L) 06/24/2019    MCV 89.7 06/24/2019     06/24/2019     Lab Results   Component Value Date    GLUCOSE " 93 06/24/2019    BUN 9 06/24/2019    CREATININE 0.65 06/24/2019    EGFRIFNONA 99 06/24/2019    BCR 13.8 06/24/2019    K 4.0 06/24/2019    CO2 22.1 06/24/2019    CALCIUM 8.9 06/24/2019    ALBUMIN 4.10 06/06/2018    AST 13 06/06/2018    ALT 11 06/06/2018     Lab Results   Component Value Date    HGBA1C 4.47 (L) 10/29/2017     Lab Results   Component Value Date    CHOL 149 02/14/2019    CHOL 179 10/29/2017     Lab Results   Component Value Date    HDL 94 (H) 02/14/2019    HDL 94 (H) 10/29/2017     Lab Results   Component Value Date    LDL 45 02/14/2019    LDL 72 10/29/2017     Lab Results   Component Value Date    TRIG 50 02/14/2019    TRIG 64 10/29/2017     No results found for: RPR  Lab Results   Component Value Date    TSH 2.830 06/06/2018     Lab Results   Component Value Date    OMVKLFXL45 189 (L) 10/29/2017       Physical Exam:  GENERAL: NAD, obese  HEENT: Normocephalic, atraumatic   COR: RRR  Resp: Even and unlabored  Extremities: Equal pulses, no signs of distal embolization.     Neurological:   MS: AO. Language normal. No neglect. Higher integrative function normal  CN: II-XII normal  Motor: Normal strength and tone throughout.  Sensory: Intact light touch, vibration, temperature.  Station and Gait: Normal gait and station.    Coordination: Normal finger-to-nose, normal heel-to-shin    Impression:  Ms. Mack presents for follow-up of left MCA infarct she suffered in October 2017, likely secondary to thrombosis of large left ICA aneurysm s/p coiling by Dr. Day.  Also underwent PFO/ASD closure in November 2018.  She is since been maintained on ASA plus Lipitor with no recurrent or new stroke/TIA symptoms and a normal neurologic exam today.  We reviewed her risk factors for stroke with importance of risk factor control for stroke prevention.  She will be establishing with a PCP and follow-up from there with routine lab work.  We reviewed the signs and symptoms of stroke and the importance of calling 911 if  she were to develop any of these.  She will follow-up in 1 year, sooner if symptoms warrant.  She voiced understanding and agrees with the plan.    Plan:     1. LMCA stroke  Patient to get established with PCP -- f/u for continued cholesterol surveillance  Continue ASA, Lipitor 20 mg, B12 replacement  Monitor BP regularly  Encouraged regular physical activity  Secondary stroke prevention: Ideal targets for stroke prevention would be Blood pressure < 130/80; B12 > 500 TSH in normal range and LDL < 70; HbA1c < 6.5 and smoking cessation if applicable.  Call 911 for stroke symptoms  Follow-up in on year    2. Left ICA aneurysm s/p coil embolization  Per [facility] f/u in June 2021 for repeat CTA h/n    Mariaelena was seen today for stroke.    Diagnoses and all orders for this visit:    Left middle cerebral artery stroke (CMS/HCC)    B12 deficiency    Hx of cerebral aneurysm repair    Hx of percutaneous transcatheter closure of congenital ASD    Class 2 obesity due to excess calories with body mass index (BMI) of 36.0 to 36.9 in adult, unspecified whether serious comorbidity present        Coding      Dictated using Dragon

## 2020-06-01 RX ORDER — ATORVASTATIN CALCIUM 20 MG/1
20 TABLET, FILM COATED ORAL NIGHTLY
Qty: 90 TABLET | Refills: 3 | Status: SHIPPED | OUTPATIENT
Start: 2020-06-01 | End: 2021-06-02

## 2020-06-01 NOTE — TELEPHONE ENCOUNTER
Pt last seen 2/13/2020. Follow up scheduled 2/11/2021.       Quantity 90 for 90 days.      Please review and approve or advise.     Thank you.

## 2020-06-04 ENCOUNTER — TELEPHONE (OUTPATIENT)
Dept: NEUROLOGY | Facility: CLINIC | Age: 45
End: 2020-06-04

## 2020-06-04 NOTE — TELEPHONE ENCOUNTER
Provider: GRIS DE JESUS  Caller: JOY ANDERSEN  Relationship to Patient: SELF    Reason for Call: PT CALLING ABOUT HER MEDICATION - atorvastatin (LIPITOR) 20 MG tablet AND WANTING TO KNOW IF A PRESCRIPTION FOR THE - atorvastatin (LIPITOR) 20 MG tablet CAN BE SENT TO THE [facility]. PT HAS A NEW PCP BUT DOESN'T SEE HER UNTIL June 18TH. PLEASE CALL HER [phone] AND LET HER KNOW.

## 2020-06-18 ENCOUNTER — OFFICE VISIT (OUTPATIENT)
Dept: FAMILY MEDICINE CLINIC | Facility: CLINIC | Age: 45
End: 2020-06-18

## 2020-06-18 VITALS
RESPIRATION RATE: 16 BRPM | OXYGEN SATURATION: 100 % | DIASTOLIC BLOOD PRESSURE: 76 MMHG | TEMPERATURE: 97.4 F | WEIGHT: 218 LBS | HEART RATE: 74 BPM | HEIGHT: 66 IN | BODY MASS INDEX: 35.03 KG/M2 | SYSTOLIC BLOOD PRESSURE: 126 MMHG

## 2020-06-18 DIAGNOSIS — E55.9 VITAMIN D DEFICIENCY: ICD-10-CM

## 2020-06-18 DIAGNOSIS — D50.0 IRON DEFICIENCY ANEMIA DUE TO CHRONIC BLOOD LOSS: ICD-10-CM

## 2020-06-18 DIAGNOSIS — E53.8 LOW SERUM VITAMIN B12: ICD-10-CM

## 2020-06-18 DIAGNOSIS — E53.8 B12 DEFICIENCY: ICD-10-CM

## 2020-06-18 DIAGNOSIS — Z86.79 HX OF CEREBRAL ANEURYSM REPAIR: ICD-10-CM

## 2020-06-18 DIAGNOSIS — Z87.74 HX OF PERCUTANEOUS TRANSCATHETER CLOSURE OF CONGENITAL ASD: ICD-10-CM

## 2020-06-18 DIAGNOSIS — Z98.890 HX OF CEREBRAL ANEURYSM REPAIR: ICD-10-CM

## 2020-06-18 DIAGNOSIS — I63.512 LEFT MIDDLE CEREBRAL ARTERY STROKE (HCC): Primary | ICD-10-CM

## 2020-06-18 PROCEDURE — 99204 OFFICE O/P NEW MOD 45 MIN: CPT | Performed by: PHYSICIAN ASSISTANT

## 2020-06-18 NOTE — PROGRESS NOTES
"Subjective   Mariaelena Mack is a 45 y.o. female.     History of Present Illness   Mariaelena Mack 45 y.o. female who presents today for a new patient appointment.    she has a history of   Patient Active Problem List   Diagnosis   • Intracranial aneurysm   • Left middle cerebral artery stroke (CMS/HCC)   • Iron deficiency anemia due to chronic blood loss   • Iron and its compounds causing adverse effect in therapeutic use   • Hx of cerebral aneurysm repair   • PFO (patent foramen ovale)   • B12 deficiency   • History of migraine   • Cerebral aneurysm   • ASD (atrial septal defect)   • Cerebrovascular accident (CVA) (CMS/HCC)   • Hx of percutaneous transcatheter closure of congenital ASD   • Class 2 obesity in adult   .  she is here to establish care I reviewed the PFSH recorded today by my MA/LPN staff.   she   She has been feeling well.  Hx STROKE d/t brain aneurysm  Sees neuro    PFO repair    Sees cardio yearly----follow PFO closure    Ms. Mack has a h/o left MCA infarct she suffered in October 2017, etiology likely due to thrombosis of aneurysm that was treated by Dr. Day. However, a cardioembolic/paradoxical source could not be ruled out given the finding of PFO/ASD and she had closure device placement in Nov. 2018.  She has since been maintained on ASA and Lipitor with no recurrent or new stroke/TIA symptoms.  She has essentially returned to her neurologic baseline he has no residuals.      Hist migraine  GYN did D/c; unable to do ablation.     Want B12>500  Need all labs; check on anemia  Mariaelena Mack 45 y.o. female /76 (BP Location: Left arm, Patient Position: Sitting, Cuff Size: Large Adult)   Pulse 74   Temp 97.4 °F (36.3 °C) (Skin)   Resp 16   Ht 167.6 cm (65.98\")   Wt 98.9 kg (218 lb)   SpO2 100%   BMI 35.21 kg/m²  who presents today for labs; need to make sure goals met --hx CVA; neuro has her on ASA and statin. Want LDL <70.  she has a history of   Patient Active " Problem List   Diagnosis   • Intracranial aneurysm   • Left middle cerebral artery stroke (CMS/HCC)   • Iron deficiency anemia due to chronic blood loss   • Iron and its compounds causing adverse effect in therapeutic use   • Hx of cerebral aneurysm repair   • PFO (patent foramen ovale)   • B12 deficiency   • History of migraine   • Cerebral aneurysm   • ASD (atrial septal defect)   • Cerebrovascular accident (CVA) (CMS/HCC)   • Hx of percutaneous transcatheter closure of congenital ASD   • Class 2 obesity in adult   .    Needs update on iron; periods some heavier  Not depressed.    The following portions of the patient's history were reviewed and updated as appropriate: allergies, current medications, past family history, past medical history, past social history, past surgical history and problem list.    Review of Systems   Constitutional: Negative for activity change, appetite change and unexpected weight change.   HENT: Negative for nosebleeds and trouble swallowing.    Eyes: Negative for pain and visual disturbance.   Respiratory: Negative for chest tightness, shortness of breath and wheezing.    Cardiovascular: Negative for chest pain and palpitations.   Gastrointestinal: Negative for abdominal pain and blood in stool.   Endocrine: Negative.    Genitourinary: Negative for difficulty urinating and hematuria.   Musculoskeletal: Positive for joint swelling.   Skin: Negative for color change and rash.   Allergic/Immunologic: Negative.    Neurological: Negative for syncope and speech difficulty.   Hematological: Negative for adenopathy.   Psychiatric/Behavioral: Negative for agitation and confusion.   All other systems reviewed and are negative.      Objective   Physical Exam   Constitutional: She is oriented to person, place, and time. She appears well-developed and well-nourished. No distress.   HENT:   Head: Normocephalic and atraumatic.   Eyes: Pupils are equal, round, and reactive to light. Conjunctivae and  EOM are normal. Right eye exhibits no discharge. Left eye exhibits no discharge. No scleral icterus.   Neck: Normal range of motion. Neck supple. No tracheal deviation present. No thyromegaly present.   Cardiovascular: Normal rate, regular rhythm, normal heart sounds, intact distal pulses and normal pulses. Exam reveals no gallop.   No murmur heard.  Trace pedal edema = bilat       Pulmonary/Chest: Effort normal and breath sounds normal. No respiratory distress. She has no wheezes. She has no rales.   Musculoskeletal: Normal range of motion.   Neurological: She is alert and oriented to person, place, and time. She exhibits normal muscle tone. Coordination normal.   Skin: Skin is warm. No rash noted. No erythema. No pallor.   Psychiatric: She has a normal mood and affect. Her behavior is normal. Judgment and thought content normal.   Nursing note and vitals reviewed.      Assessment/Plan   Mariaelena was seen today for establish care and hyperlipidemia.    Diagnoses and all orders for this visit:    Left middle cerebral artery stroke (CMS/HCC)    Hx of cerebral aneurysm repair    Hx of percutaneous transcatheter closure of congenital ASD    B12 deficiency    Iron deficiency anemia due to chronic blood loss    Vitamin D deficiency    Low serum vitamin B12      Plan, Mariaelena Mack, was seen today.  she was seen for Vitamin D deficiency and will update labs  and CVA hx and on statinf/u on iron  Exercise more  Cont. B12  Consider Tdap

## 2020-09-03 ENCOUNTER — DOCUMENTATION (OUTPATIENT)
Dept: SLEEP MEDICINE | Facility: HOSPITAL | Age: 45
End: 2020-09-03

## 2020-09-03 NOTE — PROGRESS NOTES
On-call physician received message from lab about critically low hemoglobin result of 6.2.  I called patient to inform her of results and advised her to go to ER for further work-up/possible transfusion given the critically low hemoglobin level.  Patient reports that she is been dealing with iron deficiency issues for quite some time.  She has just come off her menstrual cycle yesterday which she thinks is contributing to the low hemogl obin.  She denies dizziness lightheadedness or fatigue.  Again advised patient she should go to the ER as she may need a transfusion.  Patient reports that she understands my recommendation.

## 2020-09-04 ENCOUNTER — TELEPHONE (OUTPATIENT)
Dept: FAMILY MEDICINE CLINIC | Facility: CLINIC | Age: 45
End: 2020-09-04

## 2020-09-04 ENCOUNTER — APPOINTMENT (OUTPATIENT)
Dept: GENERAL RADIOLOGY | Facility: HOSPITAL | Age: 45
End: 2020-09-04

## 2020-09-04 ENCOUNTER — HOSPITAL ENCOUNTER (OUTPATIENT)
Facility: HOSPITAL | Age: 45
Setting detail: OBSERVATION
Discharge: HOME | End: 2020-09-05
Attending: EMERGENCY MEDICINE | Admitting: OBSTETRICS & GYNECOLOGY

## 2020-09-04 DIAGNOSIS — N93.9 ABNORMAL UTERINE BLEEDING: ICD-10-CM

## 2020-09-04 DIAGNOSIS — D64.9 SYMPTOMATIC ANEMIA: Primary | ICD-10-CM

## 2020-09-04 LAB
25(OH)D3+25(OH)D2 SERPL-MCNC: 20.7 NG/ML (ref 30–100)
ABO GROUP BLD: NORMAL
ALBUMIN SERPL-MCNC: 4.5 G/DL (ref 3.5–5.2)
ALBUMIN SERPL-MCNC: 4.5 G/DL (ref 3.5–5.2)
ALBUMIN/GLOB SERPL: 2.1 G/DL
ALBUMIN/GLOB SERPL: 2.5 G/DL
ALP SERPL-CCNC: 45 U/L (ref 39–117)
ALP SERPL-CCNC: 53 U/L (ref 39–117)
ALT SERPL W P-5'-P-CCNC: 12 U/L (ref 1–33)
ALT SERPL-CCNC: 11 U/L (ref 1–33)
ANION GAP SERPL CALCULATED.3IONS-SCNC: 10.8 MMOL/L (ref 5–15)
ANISOCYTOSIS BLD QL: ABNORMAL
AST SERPL-CCNC: 19 U/L (ref 1–32)
AST SERPL-CCNC: 21 U/L (ref 1–32)
BACTERIA UR QL AUTO: NORMAL /HPF
BASOPHILS # BLD AUTO: ABNORMAL 10*3/UL
BASOPHILS # BLD MANUAL: 0.07 10*3/MM3 (ref 0–0.2)
BASOPHILS NFR BLD AUTO: 1 % (ref 0–1.5)
BILIRUB SERPL-MCNC: 0.5 MG/DL (ref 0–1.2)
BILIRUB SERPL-MCNC: 0.6 MG/DL (ref 0–1.2)
BILIRUB UR QL STRIP: NEGATIVE
BLD GP AB SCN SERPL QL: NEGATIVE
BUN SERPL-MCNC: 7 MG/DL (ref 6–20)
BUN SERPL-MCNC: 8 MG/DL (ref 6–20)
BUN/CREAT SERPL: 10 (ref 7–25)
BUN/CREAT SERPL: 11.1 (ref 7–25)
CALCIUM SERPL-MCNC: 9.1 MG/DL (ref 8.6–10.5)
CALCIUM SPEC-SCNC: 9.1 MG/DL (ref 8.6–10.5)
CHLORIDE SERPL-SCNC: 103 MMOL/L (ref 98–107)
CHLORIDE SERPL-SCNC: 105 MMOL/L (ref 98–107)
CHOLEST SERPL-MCNC: 135 MG/DL (ref 0–200)
CLARITY UR: CLEAR
CO2 SERPL-SCNC: 23.2 MMOL/L (ref 22–29)
CO2 SERPL-SCNC: 23.4 MMOL/L (ref 22–29)
COLOR UR: YELLOW
CREAT SERPL-MCNC: 0.7 MG/DL (ref 0.57–1)
CREAT SERPL-MCNC: 0.72 MG/DL (ref 0.57–1)
DEPRECATED RDW RBC AUTO: 47 FL (ref 37–54)
DIFFERENTIAL COMMENT: ABNORMAL
EOSINOPHIL # BLD AUTO: ABNORMAL 10*3/UL
EOSINOPHIL # BLD MANUAL: 0.05 10*3/MM3 (ref 0–0.4)
EOSINOPHIL # BLD MANUAL: 0.07 10*3/MM3 (ref 0–0.4)
EOSINOPHIL NFR BLD AUTO: ABNORMAL %
EOSINOPHIL NFR BLD MANUAL: 1 % (ref 0.3–6.2)
EOSINOPHIL NFR BLD MANUAL: 1.1 % (ref 0.3–6.2)
ERYTHROCYTE [DISTWIDTH] IN BLOOD BY AUTOMATED COUNT: 19.7 % (ref 12.3–15.4)
ERYTHROCYTE [DISTWIDTH] IN BLOOD BY AUTOMATED COUNT: 19.8 % (ref 12.3–15.4)
FERRITIN SERPL-MCNC: 4.67 NG/ML (ref 13–150)
FOLATE SERPL-MCNC: 13.4 NG/ML (ref 4.78–24.2)
GFR SERPL CREATININE-BSD FRML MDRD: 90 ML/MIN/1.73
GLOBULIN SER CALC-MCNC: 1.8 GM/DL
GLOBULIN UR ELPH-MCNC: 2.1 GM/DL
GLUCOSE SERPL-MCNC: 96 MG/DL (ref 65–99)
GLUCOSE SERPL-MCNC: 97 MG/DL (ref 65–99)
GLUCOSE UR STRIP-MCNC: NEGATIVE MG/DL
HBA1C MFR BLD: 4.4 % (ref 4.8–5.6)
HCG SERPL QL: NEGATIVE
HCT VFR BLD AUTO: 25.3 % (ref 34–46.6)
HCT VFR BLD AUTO: 25.4 % (ref 34–46.6)
HDLC SERPL-MCNC: 89 MG/DL (ref 40–60)
HGB BLD-MCNC: 6.2 G/DL (ref 12–15.9)
HGB BLD-MCNC: 6.2 G/DL (ref 12–15.9)
HGB UR QL STRIP.AUTO: ABNORMAL
HOLD SPECIMEN: NORMAL
HYALINE CASTS UR QL AUTO: NORMAL /LPF
HYPOCHROMIA BLD QL: ABNORMAL
IRON SATN MFR SERPL: 2 % (ref 20–50)
IRON SERPL-MCNC: 11 MCG/DL (ref 37–145)
KETONES UR QL STRIP: NEGATIVE
LDLC SERPL CALC-MCNC: 36 MG/DL (ref 0–100)
LEUKOCYTE ESTERASE UR QL STRIP.AUTO: NEGATIVE
LYMPHOCYTES # BLD AUTO: ABNORMAL 10*3/UL
LYMPHOCYTES # BLD MANUAL: 0.83 10*3/MM3 (ref 0.7–3.1)
LYMPHOCYTES # BLD MANUAL: 1.39 10*3/MM3 (ref 0.7–3.1)
LYMPHOCYTES NFR BLD AUTO: ABNORMAL %
LYMPHOCYTES NFR BLD MANUAL: 16.9 % (ref 19.6–45.3)
LYMPHOCYTES NFR BLD MANUAL: 21.2 % (ref 19.6–45.3)
LYMPHOCYTES NFR BLD MANUAL: 3 % (ref 5–12)
MAGNESIUM SERPL-MCNC: 1.9 MG/DL (ref 1.6–2.6)
MCH RBC QN AUTO: 16.4 PG (ref 26.6–33)
MCH RBC QN AUTO: 16.5 PG (ref 26.6–33)
MCHC RBC AUTO-ENTMCNC: 24.4 G/DL (ref 31.5–35.7)
MCHC RBC AUTO-ENTMCNC: 24.5 G/DL (ref 31.5–35.7)
MCV RBC AUTO: 67.2 FL (ref 79–97)
MCV RBC AUTO: 67.5 FL (ref 79–97)
MICROCYTES BLD QL: ABNORMAL
MONOCYTES # BLD AUTO: 0.2 10*3/MM3 (ref 0.1–0.9)
MONOCYTES # BLD MANUAL: 0.11 10*3/MM3 (ref 0.1–0.9)
MONOCYTES NFR BLD AUTO: ABNORMAL %
MONOCYTES NFR BLD MANUAL: 2.2 % (ref 5–12)
NEUTROPHILS # BLD AUTO: 4.75 10*3/MM3 (ref 1.7–7)
NEUTROPHILS # BLD MANUAL: 3.91 10*3/MM3 (ref 1.7–7)
NEUTROPHILS NFR BLD AUTO: ABNORMAL %
NEUTROPHILS NFR BLD MANUAL: 72.7 % (ref 42.7–76)
NEUTROPHILS NFR BLD MANUAL: 79.8 % (ref 42.7–76)
NITRITE UR QL STRIP: NEGATIVE
OVALOCYTES BLD QL SMEAR: ABNORMAL
PH UR STRIP.AUTO: 5.5 [PH] (ref 5–8)
PLAT MORPH BLD: NORMAL
PLATELET # BLD AUTO: 494 10*3/MM3 (ref 140–450)
PLATELET # BLD AUTO: 506 10*3/MM3 (ref 140–450)
PLATELET BLD QL SMEAR: ABNORMAL
PMV BLD AUTO: 10.9 FL (ref 6–12)
POIKILOCYTOSIS BLD QL SMEAR: ABNORMAL
POLYCHROMASIA BLD QL SMEAR: ABNORMAL
POTASSIUM SERPL-SCNC: 3.5 MMOL/L (ref 3.5–5.2)
POTASSIUM SERPL-SCNC: 4.6 MMOL/L (ref 3.5–5.2)
PROT SERPL-MCNC: 6.3 G/DL (ref 6–8.5)
PROT SERPL-MCNC: 6.6 G/DL (ref 6–8.5)
PROT UR QL STRIP: NEGATIVE
RBC # BLD AUTO: 3.75 10*6/MM3 (ref 3.77–5.28)
RBC # BLD AUTO: 3.78 10*6/MM3 (ref 3.77–5.28)
RBC # UR: NORMAL /HPF
RBC MORPH BLD: ABNORMAL
REF LAB TEST METHOD: NORMAL
RH BLD: POSITIVE
SCHISTOCYTES BLD QL SMEAR: ABNORMAL
SODIUM SERPL-SCNC: 138 MMOL/L (ref 136–145)
SODIUM SERPL-SCNC: 139 MMOL/L (ref 136–145)
SP GR UR STRIP: <1.005 (ref 1–1.03)
SQUAMOUS #/AREA URNS HPF: NORMAL /HPF
T&S EXPIRATION DATE: NORMAL
T3FREE SERPL-MCNC: 2.6 PG/ML (ref 2–4.4)
T4 FREE SERPL-MCNC: 1.13 NG/DL (ref 0.93–1.7)
TIBC SERPL-MCNC: 510 MCG/DL
TRIGL SERPL-MCNC: 52 MG/DL (ref 0–150)
TROPONIN T SERPL-MCNC: <0.01 NG/ML (ref 0–0.03)
TSH SERPL DL<=0.005 MIU/L-ACNC: 1.42 UIU/ML (ref 0.27–4.2)
UIBC SERPL-MCNC: 499 MCG/DL (ref 112–346)
UROBILINOGEN UR QL STRIP: ABNORMAL
VARIANT LYMPHS NFR BLD MANUAL: 1 % (ref 0–5)
VIT B12 SERPL-MCNC: 952 PG/ML (ref 211–946)
VLDLC SERPL CALC-MCNC: 10.4 MG/DL
WBC # BLD AUTO: 4.9 10*3/MM3 (ref 3.4–10.8)
WBC # BLD AUTO: 6.54 10*3/MM3 (ref 3.4–10.8)
WBC MORPH BLD: NORMAL
WBC UR QL AUTO: NORMAL /HPF
WHOLE BLOOD HOLD SPECIMEN: NORMAL
WHOLE BLOOD HOLD SPECIMEN: NORMAL

## 2020-09-04 PROCEDURE — C9803 HOPD COVID-19 SPEC COLLECT: HCPCS | Performed by: PHYSICIAN ASSISTANT

## 2020-09-04 PROCEDURE — 83735 ASSAY OF MAGNESIUM: CPT

## 2020-09-04 PROCEDURE — 84703 CHORIONIC GONADOTROPIN ASSAY: CPT

## 2020-09-04 PROCEDURE — 36430 TRANSFUSION BLD/BLD COMPNT: CPT

## 2020-09-04 PROCEDURE — G0378 HOSPITAL OBSERVATION PER HR: HCPCS

## 2020-09-04 PROCEDURE — 86923 COMPATIBILITY TEST ELECTRIC: CPT

## 2020-09-04 PROCEDURE — P9016 RBC LEUKOCYTES REDUCED: HCPCS

## 2020-09-04 PROCEDURE — 81001 URINALYSIS AUTO W/SCOPE: CPT | Performed by: EMERGENCY MEDICINE

## 2020-09-04 PROCEDURE — 86850 RBC ANTIBODY SCREEN: CPT

## 2020-09-04 PROCEDURE — 71045 X-RAY EXAM CHEST 1 VIEW: CPT

## 2020-09-04 PROCEDURE — 86900 BLOOD TYPING SEROLOGIC ABO: CPT

## 2020-09-04 PROCEDURE — 85025 COMPLETE CBC W/AUTO DIFF WBC: CPT

## 2020-09-04 PROCEDURE — 84484 ASSAY OF TROPONIN QUANT: CPT

## 2020-09-04 PROCEDURE — 36415 COLL VENOUS BLD VENIPUNCTURE: CPT

## 2020-09-04 PROCEDURE — 85007 BL SMEAR W/DIFF WBC COUNT: CPT

## 2020-09-04 PROCEDURE — 86901 BLOOD TYPING SEROLOGIC RH(D): CPT

## 2020-09-04 PROCEDURE — 93010 ELECTROCARDIOGRAM REPORT: CPT | Performed by: INTERNAL MEDICINE

## 2020-09-04 PROCEDURE — U0004 COV-19 TEST NON-CDC HGH THRU: HCPCS | Performed by: PHYSICIAN ASSISTANT

## 2020-09-04 PROCEDURE — 99284 EMERGENCY DEPT VISIT MOD MDM: CPT

## 2020-09-04 PROCEDURE — 93005 ELECTROCARDIOGRAM TRACING: CPT | Performed by: EMERGENCY MEDICINE

## 2020-09-04 PROCEDURE — 80053 COMPREHEN METABOLIC PANEL: CPT

## 2020-09-04 RX ORDER — DIPHENHYDRAMINE HCL 50 MG
50 CAPSULE ORAL NIGHTLY PRN
Status: DISCONTINUED | OUTPATIENT
Start: 2020-09-04 | End: 2020-09-05 | Stop reason: HOSPADM

## 2020-09-04 RX ORDER — IBUPROFEN 600 MG/1
600 TABLET ORAL EVERY 6 HOURS PRN
Status: DISCONTINUED | OUTPATIENT
Start: 2020-09-04 | End: 2020-09-05 | Stop reason: HOSPADM

## 2020-09-04 RX ORDER — SODIUM CHLORIDE 0.9 % (FLUSH) 0.9 %
10 SYRINGE (ML) INJECTION AS NEEDED
Status: DISCONTINUED | OUTPATIENT
Start: 2020-09-04 | End: 2020-09-05 | Stop reason: HOSPADM

## 2020-09-04 RX ORDER — ACETAMINOPHEN 325 MG/1
650 TABLET ORAL EVERY 4 HOURS PRN
Status: DISCONTINUED | OUTPATIENT
Start: 2020-09-04 | End: 2020-09-05 | Stop reason: HOSPADM

## 2020-09-04 RX ORDER — SODIUM CHLORIDE 0.9 % (FLUSH) 0.9 %
10 SYRINGE (ML) INJECTION EVERY 12 HOURS SCHEDULED
Status: DISCONTINUED | OUTPATIENT
Start: 2020-09-04 | End: 2020-09-05 | Stop reason: HOSPADM

## 2020-09-04 RX ORDER — FOLIC ACID 1 MG/1
1 TABLET ORAL DAILY
Qty: 90 TABLET | Refills: 1 | Status: SHIPPED | OUTPATIENT
Start: 2020-09-04 | End: 2020-09-05 | Stop reason: HOSPADM

## 2020-09-04 RX ADMIN — SODIUM CHLORIDE 500 ML: 9 INJECTION, SOLUTION INTRAVENOUS at 15:56

## 2020-09-04 NOTE — ED PROVIDER NOTES
Pt presents to the ED c/o  heavy vaginal bleeding and anemia.  She had similar symptoms last year and underwent a D&C by Dr. Jay (GYN).  She was told she will eventually need a hysterectomy.  She has felt fatigued and weak late.     On exam,   Awake, alert, no acute distress  Abdomen-nondistended, nontender  Pelvic-deferred per patient request     Plan: She is anemic-she will need be transfused.  Admit to GYN service for further treatment.      Appropriate-PPE was worn by myself and the patient throughout our entire interaction.       Attestation:  The CRISTI and I have discussed this patient's history, physical exam, and treatment plan.  I have reviewed the documentation and personally had a face to face interaction with the patient. I affirm the documentation and agree with the treatment and plan.  The attached note describes my personal findings.            Ming Andino MD  09/04/20 3503

## 2020-09-04 NOTE — TELEPHONE ENCOUNTER
Pt called back - pt states that she has had a DNC - not a hysterectomy.  Pt did just finish her period. Pt did not go to ER because she does not want to be admitted.

## 2020-09-04 NOTE — TELEPHONE ENCOUNTER
Need to make sure she went to the ER last night after Dr. Landrum called her with a critically low hemoglobin.  I know it is not from her periods because she has had a hysterectomy.  This is not negotiable she needs to go to the emergency room if she is not gone already

## 2020-09-04 NOTE — ED PROVIDER NOTES
[facility]    Room Number:  P697/1  Date seen:  9/4/2020  Time seen: 3:31 PM  PCP: Vera Cordova, PAMounaC  Historian: Patient     HPI:  Chief complaint: Abnormal lab  A complete HPI/ROS/PMH/PSH/SH/FH are unobtainable due to: None  Context:Mariaelena Mack is a 45 y.o. female, hx of iron deficiency anemia, CVA, abnormal uterine bleeding with multiple fibroids, who presents to the ED with c/o having blood work done to us with her new primary care provider yesterday when her PCP called today advising patient to go to the ER for low hemoglobin.  Patient reports that she has history of iron deficiency anemia, see CBC for, and has had to receive blood transfusion once.  She reports the etiology is from chronic abdominal uterine bleeding with multiple fibroids and sees Dr. Theresa Jay.  She had a D&C done last year which completely resolved the abnormal vaginal bleeding but started again 7 days ago.  Her last normal menstrual period started 7 days ago and has been heavier than usual and lasting longer.  Patient denies any dysuria, abdominal pain patient has been complaining of generalized weakness for the past 7 days.    Patient was placed in face mask in first look. Patient was wearing facemask when I entered the room and throughout our encounter. I wore full protective equipment throughout this patient encounter including a face mask, and gloves. Hand hygiene was performed before donning protective equipment and after removal when leaving the room.      MEDICAL RECORD REVIEW  Patient was seen by OB/GYN Dr. Jay on June 26, 2019 for abnormal uterine bleeding with multiple fibroids.    ALLERGIES  Patient has no known allergies.    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   • Iron deficiency anemia due to chronic blood loss 11/02/2017   • Iron and its compounds causing  adverse effect in therapeutic use 11/06/2017   • Hx of cerebral aneurysm repair 11/13/2017   • PFO (patent foramen ovale) 02/19/2018   • B12 deficiency 02/19/2018   • History of migraine 02/19/2018   • Cerebral aneurysm 06/11/2018   • ASD (atrial septal defect) 10/25/2018   • Cerebrovascular accident (CVA) (CMS/HCC) 10/25/2018   • Hx of percutaneous transcatheter closure of congenital ASD 11/16/2018   • Class 2 obesity in adult 02/13/2020     Resolved Ambulatory Problems     Diagnosis Date Noted   • No Resolved Ambulatory Problems     Past Medical History:   Diagnosis Date   • Anxiety    • Bulging of thoracic intervertebral disc    • Headache, tension-type    • History of anemia    • History of transfusion    • Hyperlipidemia    • Internal carotid aneurysm    • Iron deficiency anemia 11/2/2017   • Menstrual problem    • Migraine    • Right ventricular enlargement    • Sciatica of left side    • Stroke (CMS/HCC)    • Tattoos    • Uterine fibroid        PAST SURGICAL HISTORY  Past Surgical History:   Procedure Laterality Date   • CARDIAC CATHETERIZATION N/A 11/9/2018    Procedure: Septal Defect Closure;  Surgeon: Kadeem Greene MD;  Location: [facility] CATH INVASIVE LOCATION;  Service: Cardiology   • CEREBRAL ANEURYSM REPAIR  11/2007   • CEREBRAL ANGIOGRAM N/A 6/11/2018    Procedure: CEREBRAL ANGIOGRAM;  Surgeon: Roger Day MD;  Location: [facility] OR 18/19;  Service: Neurosurgery   • D&C HYSTEROSCOPY ENDOMETRIAL ABLATION N/A 6/26/2019    Procedure: DILATATION AND CURETTAGE, HYSTEROSCOPY =, MYOMECTOMY USING MYOSURE;  Surgeon: Soraida Jay MD;  Location: [facility] OR [facility];  Service: Obstetrics/Gynecology   • EMBOLIZATION CEREBRAL N/A 11/2/2017    Procedure: Cerebral angiogram and embolization of cerebral angiogram with Pipeline Embolization Device and coils.;  Surgeon: Roger Day MD;  Location: [facility] OR 18/19;  Service:    • PATENT FORAMEN OVALE CLOSURE     • PATENT FORAMEN OVALE CLOSURE  11/2018        FAMILY HISTORY  Family History   Problem Relation Age of Onset   • Migraines Mother    • Heart failure Mother    • Stroke Mother    • Stroke Maternal Grandmother    • Diabetes Maternal Grandmother    • Heart disease Father    • Cancer Sister    • Stroke Sister    • Breast cancer Sister 54   • Bone cancer Sister 54   • Diabetes Brother    • Leukemia Paternal Uncle    • Malig Hyperthermia Neg Hx        SOCIAL HISTORY  Social History     Socioeconomic History   • Marital status:      Spouse name: Silvestre   • Number of children: 1   • Years of education: College   • Highest education level: Not on file   Occupational History   • Occupation:      Employer: [employer]     Comment: full-time   Tobacco Use   • Smoking status: Former Smoker     Packs/day: 1.00     Years: 21.00     Pack years: 21.00     Types: Electronic Cigarette, Cigarettes     Last attempt to quit:      Years since quittin.6   • Smokeless tobacco: Never Used   • Tobacco comment: uses Vaporizor   Substance and Sexual Activity   • Alcohol use: Yes     Alcohol/week: 20.0 standard drinks     Types: 20 Cans of beer per week     Comment: occasional weekends   • Drug use: No   • Sexual activity: Yes     Partners: Male     Birth control/protection: None   Social History Narrative    Patient lives with  and socially drinks fairly heavily most weekends. States 6-8 beers on weekend nights. Less on weekdays.       REVIEW OF SYSTEMS  Review of Systems    All systems reviewed and negative except for those discussed in HPI.     PHYSICAL EXAM    ED Triage Vitals   Temp Heart Rate Resp BP SpO2   20 1332 20 1332 20 1332 20 1334 20 1332   97.9 °F (36.6 °C) 105 16 132/77 99 %      Temp src Heart Rate Source Patient Position BP Location FiO2 (%)   20 1332 20 1332 20 1334 20 1334 --   Tympanic Monitor Sitting Left arm      Physical Exam    I have reviewed the  triage vital signs and nursing notes.      GENERAL: not distressed  HENT: nares patent  EYES: no scleral icterus  NECK: no ROM limitations  CV: regular rhythm, regular rate  RESPIRATORY: normal effort  ABDOMEN: soft; clear to auscultation bilaterally  : deferred  MUSCULOSKELETAL: no deformity  NEURO: alert, moves all extremities, follows commands  SKIN: warm, dry    LAB RESULTS  Recent Results (from the past 24 hour(s))   Comprehensive Metabolic Panel    Collection Time: 09/04/20  2:28 PM   Result Value Ref Range    Glucose 97 65 - 99 mg/dL    BUN 7 6 - 20 mg/dL    Creatinine 0.70 0.57 - 1.00 mg/dL    Sodium 139 136 - 145 mmol/L    Potassium 3.5 3.5 - 5.2 mmol/L    Chloride 105 98 - 107 mmol/L    CO2 23.2 22.0 - 29.0 mmol/L    Calcium 9.1 8.6 - 10.5 mg/dL    Total Protein 6.6 6.0 - 8.5 g/dL    Albumin 4.50 3.50 - 5.20 g/dL    ALT (SGPT) 12 1 - 33 U/L    AST (SGOT) 19 1 - 32 U/L    Alkaline Phosphatase 53 39 - 117 U/L    Total Bilirubin 0.5 0.0 - 1.2 mg/dL    eGFR Non African Amer 90 >60 mL/min/1.73    Globulin 2.1 gm/dL    A/G Ratio 2.1 g/dL    BUN/Creatinine Ratio 10.0 7.0 - 25.0    Anion Gap 10.8 5.0 - 15.0 mmol/L   Troponin    Collection Time: 09/04/20  2:28 PM   Result Value Ref Range    Troponin T <0.010 0.000 - 0.030 ng/mL   Magnesium    Collection Time: 09/04/20  2:28 PM   Result Value Ref Range    Magnesium 1.9 1.6 - 2.6 mg/dL   hCG, Serum, Qualitative    Collection Time: 09/04/20  2:28 PM   Result Value Ref Range    HCG Qualitative Negative Negative   Type & Screen    Collection Time: 09/04/20  2:28 PM   Result Value Ref Range    ABO Type O     RH type Positive     Antibody Screen Negative     T&S Expiration Date 9/7/2020 11:59:59 PM    Light Blue Top    Collection Time: 09/04/20  2:28 PM   Result Value Ref Range    Extra Tube hold for add-on    Green Top (Gel)    Collection Time: 09/04/20  2:28 PM   Result Value Ref Range    Extra Tube Hold for add-ons.    Lavender Top    Collection Time: 09/04/20  2:28  PM   Result Value Ref Range    Extra Tube hold for add-on    CBC Auto Differential    Collection Time: 09/04/20  2:28 PM   Result Value Ref Range    WBC 6.54 3.40 - 10.80 10*3/mm3    RBC 3.78 3.77 - 5.28 10*6/mm3    Hemoglobin 6.2 (C) 12.0 - 15.9 g/dL    Hematocrit 25.4 (L) 34.0 - 46.6 %    MCV 67.2 (L) 79.0 - 97.0 fL    MCH 16.4 (L) 26.6 - 33.0 pg    MCHC 24.4 (L) 31.5 - 35.7 g/dL    RDW 19.7 (H) 12.3 - 15.4 %    RDW-SD 47.0 37.0 - 54.0 fl    MPV 10.9 6.0 - 12.0 fL    Platelets 506 (H) 140 - 450 10*3/mm3   Manual Differential    Collection Time: 09/04/20  2:28 PM   Result Value Ref Range    Neutrophil % 72.7 42.7 - 76.0 %    Lymphocyte % 21.2 19.6 - 45.3 %    Monocyte % 3.0 (L) 5.0 - 12.0 %    Eosinophil % 1.0 0.3 - 6.2 %    Basophil % 1.0 0.0 - 1.5 %    Atypical Lymphocyte % 1.0 0.0 - 5.0 %    Neutrophils Absolute 4.75 1.70 - 7.00 10*3/mm3    Lymphocytes Absolute 1.39 0.70 - 3.10 10*3/mm3    Monocytes Absolute 0.20 0.10 - 0.90 10*3/mm3    Eosinophils Absolute 0.07 0.00 - 0.40 10*3/mm3    Basophils Absolute 0.07 0.00 - 0.20 10*3/mm3    Anisocytosis Mod/2+ None Seen    Hypochromia Mod/2+ None Seen    Microcytes Mod/2+ None Seen    Ovalocytes Slight/1+ None Seen    Poikilocytes Slight/1+ None Seen    Polychromasia Slight/1+ None Seen    Schistocytes Slight/1+ None Seen    WBC Morphology Normal Normal    Platelet Morphology Normal Normal   Urinalysis With Microscopic If Indicated (No Culture) - Urine, Clean Catch    Collection Time: 09/04/20  2:46 PM   Result Value Ref Range    Color, UA Yellow Yellow, Straw    Appearance, UA Clear Clear    pH, UA 5.5 5.0 - 8.0    Specific Gravity, UA <1.005 (L) 1.005 - 1.030    Glucose, UA Negative Negative    Ketones, UA Negative Negative    Bilirubin, UA Negative Negative    Blood, UA Moderate (2+) (A) Negative    Protein, UA Negative Negative    Leuk Esterase, UA Negative Negative    Nitrite, UA Negative Negative    Urobilinogen, UA 0.2 E.U./dL 0.2 - 1.0 E.U./dL   Urinalysis,  Microscopic Only - Urine, Clean Catch    Collection Time: 09/04/20  2:46 PM   Result Value Ref Range    RBC, UA 0-2 None Seen, 0-2 /HPF    WBC, UA 0-2 None Seen, 0-2 /HPF    Bacteria, UA None Seen None Seen /HPF    Squamous Epithelial Cells, UA 0-2 None Seen, 0-2 /HPF    Hyaline Casts, UA None Seen None Seen /LPF    Methodology Manual Light Microscopy    Prepare RBC, 2 Units    Collection Time: 09/04/20  7:38 PM   Result Value Ref Range    Product Code Q7772Y20     Unit Number [id number]     UNIT  ABO O     UNIT  RH POS     Crossmatch Interpretation Compatible     Dispense Status IS     Blood Expiration Date 604024880416     Blood Type Barcode 5100     Product Code S4673Q84     Unit Number [id number]     UNIT  ABO O     UNIT  RH POS     Crossmatch Interpretation Compatible     Dispense Status XM     Blood Expiration Date [id number]     Blood Type Barcode 5100          RADIOLOGY RESULTS  XR Chest 1 View   Final Result            PROGRESS, DATA ANALYSIS, CONSULTS AND MEDICAL DECISION MAKING  All labs have been independently reviewed by me.  All radiology studies have been reviewed by me and discussed with radiologist dictating the report. Discussion below represents my analysis of pertinent findings related to patient's condition, differential diagnosis, treatment plan and final disposition.     ED Course as of Sep 04 2233   Fri Sep 04, 2020   2222 I discussed with Elab Manuel, OB/GYN on-call for Dr. Theresa Jay.  Agrees to admit patient for symptomatic anemia and abnormal uterine bleeding.  I initially want to do a pelvic exam the patient was refusing.    [SS]   2223 Patient is hemodynamically stable in the ER, will order 2 units RBC per request of Dr. Manuel.    [SS]   2225 Since patient is having symptoms with her anemia and her hemoglobin is 6.2, baseline for her is around 9/10, I believe she needs to be admitted for blood transfusion and for a consult with OB/GYN.    [SS]      ED Course User  "Index  [SS] Cristin Arriaza PA-C       The differential diagnosis include but are not limited to symptomatic anemia, vaginal bleeding, urinary tract infection.      Critical Care  Performed by: Cristin Arriaza PA-C  Authorized by: Elba Manuel MD     Critical care provider statement:     Critical care time (minutes):  30    Critical care time was exclusive of:  Separately billable procedures and treating other patients    Critical care was necessary to treat or prevent imminent or life-threatening deterioration of the following conditions:  Shock, sepsis, circulatory failure, metabolic crisis and renal failure    Critical care was time spent personally by me on the following activities:  Blood draw for specimens, discussions with consultants, examination of patient, re-evaluation of patient's condition and review of old charts    I assumed direction of critical care for this patient from another provider in my specialty: yes (Chaperoned by Dr. Andino)           Reviewed pt's history and workup with Dr. Andino.  After a bedside evaluation, Dr. Andino agrees with the plan of care.      (FOR ADMIT) Based on the patient's lab findings and presenting symptoms, the doctor and I feel it is appropriate to admit the patient for further management, evaluation, and treatment.  I have discussed this with the admitting team.  I have also discussed this with the patient/family.  They are in agreement with admission.          Disposition vitals:  /56 (BP Location: Left arm, Patient Position: Lying)   Pulse 74   Temp 99.1 °F (37.3 °C) (Oral)   Resp 18   Ht 167.6 cm (66\")   Wt 99.8 kg (220 lb)   LMP 09/01/2020   SpO2 100%   BMI 35.51 kg/m²       DIAGNOSIS  Final diagnoses:   Symptomatic anemia   Abnormal uterine bleeding       FOLLOW UP   No follow-up provider specified.       Cristin Arriaza PA-C  09/04/20 2233    "

## 2020-09-04 NOTE — H&P
Patient Care Team:  Vera Cordova PA-C as PCP - General (Family Medicine)  Soraida Jay MD as Consulting Physician (Obstetrics and Gynecology)  India Stubbs MD as Referring Physician (Family Medicine)  Sandy Alicea MD (Inactive) as Consulting Physician (Hematology and Oncology)  William Patino MD as Consulting Physician (Cardiology)  Margareth Miller APRN as Nurse Practitioner (Nurse Practitioner)  Celina Liriano APRN as Nurse Practitioner (Nurse Practitioner)    Chief complaint sympomatic anemia due to abnormal uterine bleeding    Subjective     Patient is a 45 y.o. female presents with symptomatic anemia. She is dizzy and fatigued.  She has a long history of AUB s/p D&C last year with aborted novasure ablation.  Her cycles were lighter for some time after that however since June have gradually become heavier. She had a very heavy cycle last week requiring change in combined pad and tampon every hour for several days.  It has lightened significantly and is almost gone today.      Review of Systems   Pertinent items are noted in HPI    History  Past Medical History:   Diagnosis Date   • Anxiety     WITH MEDICAL HX, NO MEDS   • ASD (atrial septal defect)     Status post closure with a 10 mm Amplatzer septal occluder device by Dr. Greene on 11/9/2018.   • Bulging of thoracic intervertebral disc     over 20 yrs ago   • Headache, tension-type    • History of anemia     IRON INFUSIONS 2/2018   • History of transfusion    • Hyperlipidemia    • Internal carotid aneurysm     Left ICA aneurysm with partial thrombosis 10/29/17 - resulting in left MCA CVA.   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   • Menstrual problem    • Migraine    • PFO (patent foramen ovale)    • Right ventricular enlargement    • Sciatica of left side    • Stroke (CMS/HCC)    • Tattoos    • Uterine fibroid      Past Surgical History:   Procedure Laterality Date   • CARDIAC  CATHETERIZATION N/A 2018    Procedure: Septal Defect Closure;  Surgeon: Kadeem Greene MD;  Location: [facility] CATH INVASIVE LOCATION;  Service: Cardiology   • CEREBRAL ANEURYSM REPAIR  2007   • CEREBRAL ANGIOGRAM N/A 2018    Procedure: CEREBRAL ANGIOGRAM;  Surgeon: Roger Day MD;  Location: [facility] OR ;  Service: Neurosurgery   • D&C HYSTEROSCOPY ENDOMETRIAL ABLATION N/A 2019    Procedure: DILATATION AND CURETTAGE, HYSTEROSCOPY =, MYOMECTOMY USING MYOSURE;  Surgeon: Soraida Jay MD;  [place] [facility] [place] [facility];  Service: Obstetrics/Gynecology   • EMBOLIZATION CEREBRAL N/A 2017    Procedure: Cerebral angiogram and embolization of cerebral angiogram with Pipeline Embolization Device and coils.;  Surgeon: Roger Day MD;  Location: [facility] OR ;  Service:    • PATENT FORAMEN OVALE CLOSURE     • PATENT FORAMEN OVALE CLOSURE  2018     Family History   Problem Relation Age of Onset   • Migraines Mother    • Heart failure Mother    • Stroke Mother    • Stroke Maternal Grandmother    • Diabetes Maternal Grandmother    • Heart disease Father    • Cancer Sister    • Stroke Sister    • Breast cancer Sister 54   • Bone cancer Sister 54   • Diabetes Brother    • Leukemia Paternal Uncle    • Malig Hyperthermia Neg Hx      Social History     Tobacco Use   • Smoking status: Former Smoker     Packs/day: 1.00     Years: 21.00     Pack years: 21.00     Types: Electronic Cigarette, Cigarettes     Last attempt to quit:      Years since quittin.6   • Smokeless tobacco: Never Used   • Tobacco comment: uses Vaporizor   Substance Use Topics   • Alcohol use: Yes     Alcohol/week: 20.0 standard drinks     Types: 20 Cans of beer per week     Comment: occasional weekends   • Drug use: No     Medications Prior to Admission   Medication Sig Dispense Refill Last Dose   • aspirin 81 MG EC tablet Take 81 mg by mouth Daily.   2020 at Unknown time   • atorvastatin (LIPITOR) 20 MG  tablet Take 1 tablet by mouth Every Night. 90 tablet 3 9/3/2020 at Unknown time   • cetirizine (zyrTEC) 10 MG tablet Take 10 mg by mouth Daily As Needed for Allergies.   9/3/2020 at Unknown time   • ibuprofen (ADVIL,MOTRIN) 200 MG tablet Take 200 mg by mouth As Needed. HOLD PRIOR TO SURGERY   Past Week at Unknown time   • vitamin B-12 (CYANOCOBALAMIN) 1000 MCG tablet Take 1,000 mcg by mouth Daily. AS DIRECTED:  AM & PM DAY BEFORE SURGERY  AM DAY OF SURGERY   9/4/2020 at Unknown time   • folic acid (FOLVITE) 1 MG tablet Take 1 tablet by mouth Daily. 90 tablet 1      Allergies:  Patient has no known allergies.    Objective     Vital Signs  Temp:  [97.5 °F (36.4 °C)-98.8 °F (37.1 °C)] 97.5 °F (36.4 °C)  Heart Rate:  [] 79  Resp:  [16-18] 18  BP: ()/(63-77) 99/63    Physical Exam:    General Appearance: alert, appears stated age and cooperative  Lungs: respirations regular, respirations even and respirations unlabored  Heart: regular rhythm & normal rate  Abdomen: soft non-tender, no guarding and no rebound tenderness  Extremities: moves extremities well, no edema, no cyanosis and no redness  Psych: normal    Results Review:    I reviewed the patient's new clinical results.  Lab Results   Component Value Date    WBC 6.54 09/04/2020    HGB 6.2 (C) 09/04/2020    HCT 25.4 (L) 09/04/2020    MCV 67.2 (L) 09/04/2020     (H) 09/04/2020       Assessment/Plan       Symptomatic anemia  Plan to admit for observation and transfusion. Dr. Jay will see patient tomorrow prior to d/c to discuss plan of care moving forward for menorrhagia        I discussed the patients findings and my recommendations with patient.     Elba Manuel MD  09/04/20  19:39

## 2020-09-04 NOTE — ED NOTES
Pt presents to ED from home. Pt had blood work done at PCP office. Pt got a call this morning that her hemoglobin was 6.2 and needed to go to  ER. Pt complains of weakness over the last week. Pt is currently A&OX4, able to ambulate, does appear pale at triage, and in a mask at this time.           Sonia Cordova, RN  09/04/20 0633

## 2020-09-04 NOTE — TELEPHONE ENCOUNTER
I just called and talked to the patient personally in explained that she had a life-threatening condition and needed to have a blood transfusion and she was to go to the hospital now.  Patient agrees and on her way

## 2020-09-04 NOTE — PLAN OF CARE
A & O x 4, states she has some dizziness occasionally, menses started Thursday, HGB 6.2 will be getting 2 units PRBCs, consent signed & on chart, HX CVA with coil in her head, previous D & Cs, previous transfusions

## 2020-09-05 ENCOUNTER — READMISSION MANAGEMENT (OUTPATIENT)
Dept: CALL CENTER | Facility: HOSPITAL | Age: 45
End: 2020-09-05

## 2020-09-05 VITALS
OXYGEN SATURATION: 99 % | WEIGHT: 220 LBS | TEMPERATURE: 98.3 F | HEIGHT: 66 IN | BODY MASS INDEX: 35.36 KG/M2 | DIASTOLIC BLOOD PRESSURE: 57 MMHG | HEART RATE: 81 BPM | SYSTOLIC BLOOD PRESSURE: 92 MMHG | RESPIRATION RATE: 16 BRPM

## 2020-09-05 LAB
ANISOCYTOSIS BLD QL: ABNORMAL
BASOPHILS # BLD MANUAL: 0.05 10*3/MM3 (ref 0–0.2)
BASOPHILS NFR BLD AUTO: 1 % (ref 0–1.5)
BH BB BLOOD EXPIRATION DATE: NORMAL
BH BB BLOOD EXPIRATION DATE: NORMAL
BH BB BLOOD TYPE BARCODE: 5100
BH BB BLOOD TYPE BARCODE: 5100
BH BB DISPENSE STATUS: NORMAL
BH BB DISPENSE STATUS: NORMAL
BH BB PRODUCT CODE: NORMAL
BH BB PRODUCT CODE: NORMAL
BH BB UNIT NUMBER: NORMAL
BH BB UNIT NUMBER: NORMAL
CROSSMATCH INTERPRETATION: NORMAL
CROSSMATCH INTERPRETATION: NORMAL
DEPRECATED RDW RBC AUTO: 57.7 FL (ref 37–54)
ERYTHROCYTE [DISTWIDTH] IN BLOOD BY AUTOMATED COUNT: 22.8 % (ref 12.3–15.4)
HCT VFR BLD AUTO: 27.8 % (ref 34–46.6)
HGB BLD-MCNC: 7.3 G/DL (ref 12–15.9)
LYMPHOCYTES # BLD MANUAL: 1.23 10*3/MM3 (ref 0.7–3.1)
LYMPHOCYTES NFR BLD MANUAL: 2 % (ref 5–12)
LYMPHOCYTES NFR BLD MANUAL: 24 % (ref 19.6–45.3)
MCH RBC QN AUTO: 18.7 PG (ref 26.6–33)
MCHC RBC AUTO-ENTMCNC: 26.3 G/DL (ref 31.5–35.7)
MCV RBC AUTO: 71.1 FL (ref 79–97)
MICROCYTES BLD QL: ABNORMAL
MONOCYTES # BLD AUTO: 0.1 10*3/MM3 (ref 0.1–0.9)
NEUTROPHILS # BLD AUTO: 3.75 10*3/MM3 (ref 1.7–7)
NEUTROPHILS NFR BLD MANUAL: 73 % (ref 42.7–76)
PLAT MORPH BLD: NORMAL
PLATELET # BLD AUTO: 403 10*3/MM3 (ref 140–450)
PMV BLD AUTO: 11.1 FL (ref 6–12)
RBC # BLD AUTO: 3.91 10*6/MM3 (ref 3.77–5.28)
SARS-COV-2 RNA RESP QL NAA+PROBE: NOT DETECTED
UNIT  ABO: NORMAL
UNIT  ABO: NORMAL
UNIT  RH: NORMAL
UNIT  RH: NORMAL
WBC # BLD AUTO: 5.14 10*3/MM3 (ref 3.4–10.8)
WBC MORPH BLD: NORMAL

## 2020-09-05 PROCEDURE — G0378 HOSPITAL OBSERVATION PER HR: HCPCS

## 2020-09-05 PROCEDURE — 85025 COMPLETE CBC W/AUTO DIFF WBC: CPT | Performed by: OBSTETRICS & GYNECOLOGY

## 2020-09-05 PROCEDURE — 85007 BL SMEAR W/DIFF WBC COUNT: CPT | Performed by: OBSTETRICS & GYNECOLOGY

## 2020-09-05 RX ADMIN — SODIUM CHLORIDE, PRESERVATIVE FREE 10 ML: 5 INJECTION INTRAVENOUS at 08:38

## 2020-09-05 NOTE — OUTREACH NOTE
Prep Survey      Responses   [facility] patient discharged from?  [facility]   Is LACE score < 7 ?  Yes   Eligibility  [facility]   Date of Admission  09/04/20   Date of Discharge  09/05/20   Discharge Disposition  Home or Self Care   Discharge diagnosis  anemia and menorrhagia   COVID-19 Test Status  Negative   Does the patient have one of the following disease processes/diagnoses(primary or secondary)?  Other   Does the patient have Home health ordered?  No   Is there a DME ordered?  No   Prep survey completed?  Yes          Wilma Arce RN

## 2020-09-05 NOTE — PLAN OF CARE
Problem: Patient Care Overview  Goal: Plan of Care Review  Outcome: Ongoing (interventions implemented as appropriate)  Flowsheets (Taken 9/5/2020 9642)  Progress: improving  Plan of Care Reviewed With: patient  Note:   Pt states she did have a heavy menstrual  period again this month but that now her bleeding is just mild. 2 units of blood infused without difficulty for low hgb. Temp max 99.1. VS stable. No c/o pain or discomfort. Up with assist for safety but no acute dizziness. Repeat CBC ordered this am

## 2020-09-05 NOTE — DISCHARGE SUMMARY
Date of Discharge:  9/5/2020    Discharge Diagnosis: anemia and menorrhagia    Presenting Problem/History of Present Illness  Abnormal uterine bleeding [N93.9]  Symptomatic anemia [D64.9]     Pt with a long hx of abnl uterine bleeding. She underwent a hysteroscopic myomectomy last year which worked for one year and then the heavy periods are starting to return. Pt was here for routine blood work and found to have a hg of 6. She is totally asx. She received 2units of prbs and today has no complaints and is requesting to go home. She is ambulating without difficulty, eating, voiding. Hg is is 7.5. I discussed her options at length. Will start with iron qod, colace daily and aygestin. She is currently not bleeding. We will plan an ultrasound this week in my office and determine final surgical plan. [facility] vs attempting novasure again. Will most likey go forward w [facility]  Patient is a 45 y.o. .      Procedures Performed         Consults:   Consults     Date and Time Order Name Status Description    9/4/2020 1608 IP General Consult (Use specialty-specific consult if known) Completed               Condition on Discharge:  Feels well.  Reports good FM.  No vb.  Ready for discharge    Vital Signs  Temp:  [97.5 °F (36.4 °C)-99.1 °F (37.3 °C)] 98.3 °F (36.8 °C)  Heart Rate:  [] 81  Resp:  [16-18] 16  BP: ()/(56-77) 92/57    Physical Exam:  General: Awake and alert  Abdomen: , soft,  non tender  Extremities:  Calves NT bilaterally        Discharge Disposition      Discharge Medications     Discharge Medications      ASK your doctor about these medications      Instructions Start Date   aspirin 81 MG EC tablet   81 mg, Oral, Daily      atorvastatin 20 MG tablet  Commonly known as:  LIPITOR   20 mg, Oral, Nightly      cetirizine 10 MG tablet  Commonly known as:  zyrTEC   10 mg, Oral, Daily PRN      folic acid 1 MG tablet  Commonly known as:  FOLVITE   1 mg, Oral, Daily      ibuprofen 200 MG  tablet  Commonly known as:  ADVIL,MOTRIN   200 mg, Oral, As Needed, HOLD PRIOR TO SURGERY      vitamin B-12 1000 MCG tablet  Commonly known as:  CYANOCOBALAMIN   1,000 mcg, Oral, Daily, AS DIRECTED: AM & PM DAY BEFORE SURGERY AM DAY OF SURGERY             Discharge Diet:     Activity at Discharge:     Follow-up Appointments  Future Appointments   Date Time Provider Department Center   2/11/2021 10:00 AM Margareth Miller APRN [facility]         Test Results Pending at Discharge   Order Current Status    COVID PRE-OP / PRE-PROCEDURE SCREENING ORDER (NO ISOLATION) - Swab, Nasopharynx In process    COVID-19,BIOTAP, NP/OP SWAB IN TRANSPORT MEDIA OR SALINE 24-36 HR TAT - Swab, Nasopharynx In process           Soraida Jay MD  09/05/20  12:20

## 2020-09-07 ENCOUNTER — TRANSITIONAL CARE MANAGEMENT TELEPHONE ENCOUNTER (OUTPATIENT)
Dept: CALL CENTER | Facility: HOSPITAL | Age: 45
End: 2020-09-07

## 2020-09-07 NOTE — OUTREACH NOTE
Call Center TCM Note      Responses   [facility] patient discharged from?  [place]   COVID-19 Test Status  Negative   Does the patient have one of the following disease processes/diagnoses(primary or secondary)?  Other   TCM attempt successful?  Yes   Call start time  1006   Call Status  Left message [on patient's VM ]   Call end time  1009   Discharge diagnosis  anemia and menorrhagia   Person spoke with today (if not patient) and relationship  Silvestre-spouse    Meds reviewed with patient/caregiver?  Yes   Is the patient having any side effects they believe may be caused by any medication additions or changes?  No   Does the patient have all medications ordered at discharge?  Yes   Is the patient taking all medications as directed (includes completed medication regime)?  Yes   Does the patient have a primary care provider?   Yes   Does the patient have an appointment with their PCP within 7 days of discharge?  Greater than 7 days   Comments regarding PCP  9/15/20 at 1:30 pm    What is preventing the patient from scheduling follow up appointments within 7 days of discharge?  Earlier appointment not available   Nursing Interventions  Verified appointment date/time/provider   Has the patient kept scheduled appointments due by today?  N/A   Pulse Ox monitoring  None   Psychosocial issues?  No   Did the patient receive a copy of their discharge instructions?  Yes   Nursing interventions  Reviewed instructions with patient   What is the patient's perception of their health status since discharge?  Improving   Is the patient/caregiver able to teach back signs and symptoms related to disease process for when to call PCP?  Yes   Is the patient/caregiver able to teach back signs and symptoms related to disease process for when to call 911?  Yes   Is the patient/caregiver able to teach back the hierarchy of who to call/visit for symptoms/problems? PCP, Specialist, Home health nurse, Urgent Care, ED, 911  Yes   If the  patient is a current smoker, are they able to teach back resources for cessation?  -- [Nonsmoker]   TCM call completed?  Yes          Paula Bermudez RN    9/7/2020, 10:09

## 2020-09-07 NOTE — PROGRESS NOTES
Case Management Discharge Note      Final Note: Discharged home 9/5/20.......Steve MERINO            Final Discharge Disposition Code: 01 - home or self-care

## 2020-09-15 ENCOUNTER — OFFICE VISIT (OUTPATIENT)
Dept: FAMILY MEDICINE CLINIC | Facility: CLINIC | Age: 45
End: 2020-09-15

## 2020-09-15 VITALS
WEIGHT: 219 LBS | HEART RATE: 82 BPM | BODY MASS INDEX: 35.2 KG/M2 | RESPIRATION RATE: 16 BRPM | SYSTOLIC BLOOD PRESSURE: 104 MMHG | DIASTOLIC BLOOD PRESSURE: 71 MMHG | HEIGHT: 66 IN | TEMPERATURE: 96.9 F

## 2020-09-15 DIAGNOSIS — Z09 HOSPITAL DISCHARGE FOLLOW-UP: Primary | ICD-10-CM

## 2020-09-15 DIAGNOSIS — Q21.12 PFO (PATENT FORAMEN OVALE): ICD-10-CM

## 2020-09-15 DIAGNOSIS — I67.1 CEREBRAL ANEURYSM: ICD-10-CM

## 2020-09-15 DIAGNOSIS — D50.0 IRON DEFICIENCY ANEMIA DUE TO CHRONIC BLOOD LOSS: ICD-10-CM

## 2020-09-15 PROCEDURE — 99495 TRANSJ CARE MGMT MOD F2F 14D: CPT | Performed by: PHYSICIAN ASSISTANT

## 2020-09-15 NOTE — PROGRESS NOTES
Transitional Care Follow Up Visit  Subjective     Mariaelena VIC Mack is a 45 y.o. female who presents for a transitional care management visit.    Within 48 business hours after discharge our office contacted her via telephone to coordinate her care and needs.      I reviewed and discussed the details of that call along with the discharge summary, hospital problems, inpatient lab results, inpatient diagnostic studies, and consultation reports with Mariaelena.     Current outpatient and discharge medications have been reconciled for the patient.  Reviewed by: Vera Cordova PA-C      Date of TCM Phone Call 9/5/2020   [facility]   Date of Admission 9/4/2020   Date of Discharge 9/5/2020   Discharge Disposition Home or Self Care     Risk for Readmission (LACE) Score: 2 (9/5/2020  6:00 AM)      History of Present Illness   Course During Hospital Stay:  9-4 to 9-5-2020     Pt with a long hx of abnl uterine bleeding. She underwent a hysteroscopic myomectomy last year which worked for one year and then the heavy periods are starting to return. Pt was here for routine blood work and found to have a hg of 6. She is totally asx. She received 2units of prbs and today has no complaints and is requesting to go home. She is ambulating without difficulty, eating, voiding. Hg is is 7.5. I discussed her options at length. Will start with iron qod, colace daily and aygestin. She is currently not bleeding. We will plan an ultrasound this week in my office and determine final surgical plan. dw [facility] vs attempting novasure again. Will most likey go forward w [facility]      Her GYN was unable to do ablation d/t irreg surface of uterus. This is scheduled for 11-4-20.  She had 2 units of blood transfused.     Labs show low Vitamin D levels.  I want you to add OTC Vitamin D 2,000 I.U. Daily.  Start after surgery.   I had just done lab update when found low hgb.  Lipids were great.      Pt sees neuro---Margareth Miller,  APRN for hx stroke. Sees cardio--Dr Patino---had PFO closure. --due to see him Nov    I see reports from studies---had normal 1 view CXR and no change on EKG. DR Jay will get f/u labs on the anemia next mo.  She is taking iron    She does have some anxiety at times   suggest Tdap  Suggest flu shot    The following portions of the patient's history were reviewed and updated as appropriate: allergies, current medications, past family history, past medical history, past social history, past surgical history and problem list.    Review of Systems   Constitutional: Positive for fatigue. Negative for activity change, appetite change and unexpected weight change.   HENT: Negative for nosebleeds and trouble swallowing.    Eyes: Negative for pain and visual disturbance.   Respiratory: Negative for chest tightness, shortness of breath and wheezing.    Cardiovascular: Negative for chest pain and palpitations.   Gastrointestinal: Negative for abdominal pain and blood in stool.   Endocrine: Negative.    Genitourinary: Negative for difficulty urinating and hematuria.   Musculoskeletal: Negative for joint swelling.   Skin: Negative for color change and rash.   Allergic/Immunologic: Negative.    Neurological: Negative for syncope and speech difficulty.   Hematological: Negative for adenopathy.   Psychiatric/Behavioral: Negative for agitation and confusion.   All other systems reviewed and are negative.      Objective   Physical Exam  Vitals signs and nursing note reviewed.   Constitutional:       General: She is not in acute distress.     Appearance: She is well-developed. She is not diaphoretic.   HENT:      Head: Normocephalic and atraumatic.      Nose: Nose normal.   Eyes:      General: No scleral icterus.        Right eye: No discharge.      Conjunctiva/sclera: Conjunctivae normal.      Pupils: Pupils are equal, round, and reactive to light.   Neck:      Musculoskeletal: Normal range of motion and neck supple.    Cardiovascular:      Rate and Rhythm: Normal rate and regular rhythm.      Pulses: Normal pulses.      Heart sounds: Normal heart sounds. No murmur.   Pulmonary:      Effort: Pulmonary effort is normal. No respiratory distress.      Breath sounds: Normal breath sounds.   Abdominal:      Palpations: Abdomen is soft.      Tenderness: There is no guarding.   Musculoskeletal: Normal range of motion.   Skin:     General: Skin is warm and dry.      Findings: No rash.   Neurological:      Mental Status: She is alert and oriented to person, place, and time. Mental status is at baseline.      Coordination: Coordination normal.   Psychiatric:         Mood and Affect: Mood normal. Affect is not inappropriate.         Behavior: Behavior normal.         Thought Content: Thought content normal.         Judgment: Judgment normal.         Assessment/Plan   Mariaelena was seen today for follow-up.    Diagnoses and all orders for this visit:    Hospital discharge follow-up    Iron deficiency anemia due to chronic blood loss    Cerebral aneurysm    PFO (patent foramen ovale)        Stay on iron  F/u GYN  After surgery will start vit D 2,000 IU daily  Suggest flu vaccine and Tdap

## 2020-11-02 ENCOUNTER — APPOINTMENT (OUTPATIENT)
Dept: PREADMISSION TESTING | Facility: HOSPITAL | Age: 45
End: 2020-11-02

## 2020-11-02 VITALS
DIASTOLIC BLOOD PRESSURE: 82 MMHG | HEIGHT: 66 IN | RESPIRATION RATE: 20 BRPM | OXYGEN SATURATION: 93 % | TEMPERATURE: 98.6 F | BODY MASS INDEX: 34.54 KG/M2 | HEART RATE: 79 BPM | WEIGHT: 214.9 LBS | SYSTOLIC BLOOD PRESSURE: 118 MMHG

## 2020-11-02 LAB
ABO GROUP BLD: NORMAL
ANION GAP SERPL CALCULATED.3IONS-SCNC: 11.9 MMOL/L (ref 5–15)
BASOPHILS # BLD AUTO: 0.04 10*3/MM3 (ref 0–0.2)
BASOPHILS NFR BLD AUTO: 0.5 % (ref 0–1.5)
BLD GP AB SCN SERPL QL: NEGATIVE
BUN SERPL-MCNC: 6 MG/DL (ref 6–20)
BUN/CREAT SERPL: 8.2 (ref 7–25)
CALCIUM SPEC-SCNC: 9.1 MG/DL (ref 8.6–10.5)
CHLORIDE SERPL-SCNC: 106 MMOL/L (ref 98–107)
CO2 SERPL-SCNC: 21.1 MMOL/L (ref 22–29)
CREAT SERPL-MCNC: 0.73 MG/DL (ref 0.57–1)
DEPRECATED RDW RBC AUTO: 55.3 FL (ref 37–54)
EOSINOPHIL # BLD AUTO: 0.01 10*3/MM3 (ref 0–0.4)
EOSINOPHIL NFR BLD AUTO: 0.1 % (ref 0.3–6.2)
ERYTHROCYTE [DISTWIDTH] IN BLOOD BY AUTOMATED COUNT: 20.5 % (ref 12.3–15.4)
GFR SERPL CREATININE-BSD FRML MDRD: 86 ML/MIN/1.73
GLUCOSE SERPL-MCNC: 97 MG/DL (ref 65–99)
HCG SERPL QL: NEGATIVE
HCT VFR BLD AUTO: 32 % (ref 34–46.6)
HGB BLD-MCNC: 9.2 G/DL (ref 12–15.9)
IMM GRANULOCYTES # BLD AUTO: 0.02 10*3/MM3 (ref 0–0.05)
IMM GRANULOCYTES NFR BLD AUTO: 0.2 % (ref 0–0.5)
LYMPHOCYTES # BLD AUTO: 0.99 10*3/MM3 (ref 0.7–3.1)
LYMPHOCYTES NFR BLD AUTO: 11.9 % (ref 19.6–45.3)
MCH RBC QN AUTO: 22.1 PG (ref 26.6–33)
MCHC RBC AUTO-ENTMCNC: 28.8 G/DL (ref 31.5–35.7)
MCV RBC AUTO: 76.7 FL (ref 79–97)
MONOCYTES # BLD AUTO: 0.52 10*3/MM3 (ref 0.1–0.9)
MONOCYTES NFR BLD AUTO: 6.3 % (ref 5–12)
NEUTROPHILS NFR BLD AUTO: 6.72 10*3/MM3 (ref 1.7–7)
NEUTROPHILS NFR BLD AUTO: 81 % (ref 42.7–76)
NRBC BLD AUTO-RTO: 0 /100 WBC (ref 0–0.2)
PLATELET # BLD AUTO: 420 10*3/MM3 (ref 140–450)
PMV BLD AUTO: 11.2 FL (ref 6–12)
POTASSIUM SERPL-SCNC: 4.1 MMOL/L (ref 3.5–5.2)
RBC # BLD AUTO: 4.17 10*6/MM3 (ref 3.77–5.28)
RH BLD: POSITIVE
SODIUM SERPL-SCNC: 139 MMOL/L (ref 136–145)
T&S EXPIRATION DATE: NORMAL
WBC # BLD AUTO: 8.3 10*3/MM3 (ref 3.4–10.8)

## 2020-11-02 PROCEDURE — U0004 COV-19 TEST NON-CDC HGH THRU: HCPCS | Performed by: NURSE PRACTITIONER

## 2020-11-02 PROCEDURE — 85025 COMPLETE CBC W/AUTO DIFF WBC: CPT | Performed by: OBSTETRICS & GYNECOLOGY

## 2020-11-02 PROCEDURE — 86901 BLOOD TYPING SEROLOGIC RH(D): CPT | Performed by: OBSTETRICS & GYNECOLOGY

## 2020-11-02 PROCEDURE — 86923 COMPATIBILITY TEST ELECTRIC: CPT

## 2020-11-02 PROCEDURE — 36415 COLL VENOUS BLD VENIPUNCTURE: CPT

## 2020-11-02 PROCEDURE — 86850 RBC ANTIBODY SCREEN: CPT | Performed by: OBSTETRICS & GYNECOLOGY

## 2020-11-02 PROCEDURE — 86900 BLOOD TYPING SEROLOGIC ABO: CPT | Performed by: OBSTETRICS & GYNECOLOGY

## 2020-11-02 PROCEDURE — C9803 HOPD COVID-19 SPEC COLLECT: HCPCS | Performed by: NURSE PRACTITIONER

## 2020-11-02 PROCEDURE — 80048 BASIC METABOLIC PNL TOTAL CA: CPT | Performed by: OBSTETRICS & GYNECOLOGY

## 2020-11-02 PROCEDURE — 84703 CHORIONIC GONADOTROPIN ASSAY: CPT | Performed by: OBSTETRICS & GYNECOLOGY

## 2020-11-02 NOTE — DISCHARGE INSTRUCTIONS
Take the following medications the morning of surgery:  NONE      ARRIVE TO OUTPATIENT SURGERY DESK 11-4-2020 8338    If you are on prescription narcotic pain medication to control your pain you may also take that medication the morning of surgery.    General Instructions:  • Do not eat solid food after midnight the night before surgery.  • You may drink clear liquids day of surgery but must stop at least one hour before your hospital arrival time.  • It is beneficial for you to have a clear drink that contains carbohydrates the day of surgery.  We suggest a 12 to 20 ounce bottle of Gatorade or Powerade for non-diabetic patients or a 12 to 20 ounce bottle of G2 or Powerade Zero for diabetic patients. (Pediatric patients, are not advised to drink a 12 to 20 ounce carbohydrate drink)    Clear liquids are liquids you can see through.  Nothing red in color.     Plain water                               Sports drinks  Sodas                                   Gelatin (Jell-O)  Fruit juices without pulp such as white grape juice and apple juice  Popsicles that contain no fruit or yogurt  Tea or coffee (no cream or milk added)  Gatorade / Powerade  G2 / Powerade Zero    • Infants may have breast milk up to four hours before surgery.  • Infants drinking formula may drink formula up to six hours before surgery.   • Patients who avoid smoking, chewing tobacco and alcohol for 4 weeks prior to surgery have a reduced risk of post-operative complications.  Quit smoking as many days before surgery as you can.  • Do not smoke, use chewing tobacco or drink alcohol the day of surgery.   • If applicable bring your C-PAP/ BI-PAP machine.  • Bring any papers given to you in the doctor’s office.  • Wear clean comfortable clothes.  • Do not wear contact lenses, false eyelashes or make-up.  Bring a case for your glasses.   • Bring crutches or walker if applicable.  • Remove all piercings.  Leave jewelry and any other valuables at  home.  • Hair extensions with metal clips must be removed prior to surgery.  • The Pre-Admission Testing nurse will instruct you to bring medications if unable to obtain an accurate list in Pre-Admission Testing.        If you were given a blood bank ID arm band remember to bring it with you the day of surgery.    Preventing a Surgical Site Infection:  • For 2 to 3 days before surgery, avoid shaving with a razor because the razor can irritate skin and make it easier to develop an infection.    • Any areas of open skin can increase the risk of a post-operative wound infection by allowing bacteria to enter and travel throughout the body.  Notify your surgeon if you have any skin wounds / rashes even if it is not near the expected surgical site.  The area will need assessed to determine if surgery should be delayed until it is healed.  • The night prior to surgery shower using a fresh bar of anti-bacterial soap (such as Dial) and clean washcloth.  Sleep in a clean bed with clean clothing.  Do not allow pets to sleep with you.  • Shower on the morning of surgery using a fresh bar of anti-bacterial soap (such as Dial) and clean washcloth.  Dry with a clean towel and dress in clean clothing.  • Ask your surgeon if you will be receiving antibiotics prior to surgery.  • Make sure you, your family, and all healthcare providers clean their hands with soap and water or an alcohol based hand  before caring for you or your wound.    Day of surgery:  Your arrival time is approximately two hours before your scheduled surgery time.  Upon arrival, a Pre-op nurse and Anesthesiologist will review your health history, obtain vital signs, and answer questions you may have.  The only belongings needed at this time will be a list of your home medications and if applicable your C-PAP/BI-PAP machine.  If you are staying overnight your family can leave the rest of your belongings in the car and bring them to your room later.  A  Pre-op nurse will start an IV and you may receive medication in preparation for surgery, including something to help you relax.  While you are in surgery your family should notify the waiting room  if they leave the waiting room area and provide a contact phone number.    Please be aware that surgery does come with discomfort.  We want to make every effort to control your discomfort so please discuss any uncontrolled symptoms with your nurse.   Your doctor will most likely have prescribed pain medications.      If you are going home after surgery you will receive individualized written care instructions before being discharged.  A responsible adult must drive you to and from the hospital on the day of your surgery and stay with you for 24 hours.    If you are staying overnight following surgery, you will be transported to your hospital room following the recovery period.  [facility] has all private rooms.    If you have any questions please call Pre-Admission Testing at [phone].  Deductibles and co-payments are collected on the day of service. Please be prepared to pay the required co-pay, deductible or deposit on the day of service as defined by your plan.    Patient Education for Self-Quarantine Process    Following your COVID testing, we strongly recommend that you do not leave your home after you have been tested for COVID except to get medical care. This includes not going to work, school or to public areas.  If this is not possible for you to do please limit your activities to only required outings.  Be sure to wear a mask when you are with other people, practice social distancing and wash your hands frequently.      The following items provide additional details to keep you safe.  • Wash your hands with soap and water frequently for at least 20 seconds.   • Avoid touching your eyes, nose and mouth with unwashed hands.  • Do not share anything - utensils, towels, food from the  same bowl.   • Have your own utensils, drinking glass, dishes, towels and bedding.   • Do not have visitors.   • Do use FaceTime to stay in touch with family and friends.  • You should stay in a specific room away from others if possible.   • Stay at least 6 feet away from others in the home if you cannot have a dedicated room to yourself.   • Do not snuggle with your pet. While the CDC says there is no evidence that pets can spread COVID-19 or be infected from humans, it is probably best to avoid “petting, snuggling, being kissed or licked and sharing food (during self-quarantine)”, according to the CDC.   • Sanitize household surfaces daily. Include all high touch areas (door handles, light switches, phones, countertops, etc.)  • Do not share a bathroom with others, if possible.   • Wear a mask around others in your home if you are unable to stay in a separate room or 6 feet apart. If  you are unable to wear a mask, have your family member wear a mask if they must be within 6 feet of you.   Call your surgeon immediately if you experience any of the following symptoms:  • Sore Throat  • Shortness of Breath or difficulty breathing  • Cough  • Chills  • Body soreness or muscle pain  • Headache  • Fever  • New loss of taste or smell  • Do not arrive for your surgery ill.  Your procedure will need to be rescheduled to another time.  You will need to call your physician before the day of surgery to avoid any unnecessary exposure to hospital staff as well as other patients.

## 2020-11-03 LAB — SARS-COV-2 RNA RESP QL NAA+PROBE: NOT DETECTED

## 2020-11-04 ENCOUNTER — HOSPITAL ENCOUNTER (OUTPATIENT)
Facility: HOSPITAL | Age: 45
Discharge: HOME | End: 2020-11-05
Attending: OBSTETRICS & GYNECOLOGY | Admitting: OBSTETRICS & GYNECOLOGY

## 2020-11-04 ENCOUNTER — ANESTHESIA (OUTPATIENT)
Dept: PERIOP | Facility: HOSPITAL | Age: 45
End: 2020-11-04

## 2020-11-04 ENCOUNTER — ANESTHESIA EVENT (OUTPATIENT)
Dept: PERIOP | Facility: HOSPITAL | Age: 45
End: 2020-11-04

## 2020-11-04 DIAGNOSIS — D21.9 FIBROIDS: ICD-10-CM

## 2020-11-04 DIAGNOSIS — N93.9 ABNORMAL UTERINE BLEEDING: ICD-10-CM

## 2020-11-04 LAB
DEPRECATED RDW RBC AUTO: 56.9 FL (ref 37–54)
ERYTHROCYTE [DISTWIDTH] IN BLOOD BY AUTOMATED COUNT: 18.9 % (ref 12.3–15.4)
HCT VFR BLD AUTO: 32.4 % (ref 34–46.6)
HGB BLD-MCNC: 9.9 G/DL (ref 12–15.9)
MCH RBC QN AUTO: 24.8 PG (ref 26.6–33)
MCHC RBC AUTO-ENTMCNC: 30.6 G/DL (ref 31.5–35.7)
MCV RBC AUTO: 81 FL (ref 79–97)
PLATELET # BLD AUTO: 355 10*3/MM3 (ref 140–450)
PMV BLD AUTO: 11.3 FL (ref 6–12)
RBC # BLD AUTO: 4 10*6/MM3 (ref 3.77–5.28)
WBC # BLD AUTO: 9.25 10*3/MM3 (ref 3.4–10.8)

## 2020-11-04 PROCEDURE — 25010000003 CEFAZOLIN IN DEXTROSE 2-4 GM/100ML-% SOLUTION: Performed by: OBSTETRICS & GYNECOLOGY

## 2020-11-04 PROCEDURE — 85027 COMPLETE CBC AUTOMATED: CPT | Performed by: OBSTETRICS & GYNECOLOGY

## 2020-11-04 PROCEDURE — 25010000002 MIDAZOLAM PER 1 MG: Performed by: ANESTHESIOLOGY

## 2020-11-04 PROCEDURE — 25010000002 FENTANYL CITRATE (PF) 100 MCG/2ML SOLUTION: Performed by: NURSE ANESTHETIST, CERTIFIED REGISTERED

## 2020-11-04 PROCEDURE — G0378 HOSPITAL OBSERVATION PER HR: HCPCS

## 2020-11-04 PROCEDURE — 25010000003 LIDOCAINE 1 % SOLUTION: Performed by: OBSTETRICS & GYNECOLOGY

## 2020-11-04 PROCEDURE — 86900 BLOOD TYPING SEROLOGIC ABO: CPT

## 2020-11-04 PROCEDURE — 25010000002 HYDROMORPHONE PER 4 MG: Performed by: NURSE ANESTHETIST, CERTIFIED REGISTERED

## 2020-11-04 PROCEDURE — 36430 TRANSFUSION BLD/BLD COMPNT: CPT

## 2020-11-04 PROCEDURE — 25010000002 DEXAMETHASONE PER 1 MG: Performed by: NURSE ANESTHETIST, CERTIFIED REGISTERED

## 2020-11-04 PROCEDURE — 25010000002 PROPOFOL 10 MG/ML EMULSION: Performed by: NURSE ANESTHETIST, CERTIFIED REGISTERED

## 2020-11-04 PROCEDURE — 25010000002 ONDANSETRON PER 1 MG: Performed by: NURSE ANESTHETIST, CERTIFIED REGISTERED

## 2020-11-04 PROCEDURE — 25010000002 NEOSTIGMINE PER 0.5 MG: Performed by: NURSE ANESTHETIST, CERTIFIED REGISTERED

## 2020-11-04 PROCEDURE — 88307 TISSUE EXAM BY PATHOLOGIST: CPT | Performed by: OBSTETRICS & GYNECOLOGY

## 2020-11-04 PROCEDURE — P9016 RBC LEUKOCYTES REDUCED: HCPCS

## 2020-11-04 DEVICE — LIGAMAX 5 MM ENDOSCOPIC MULTIPLE CLIP APPLIER
Type: IMPLANTABLE DEVICE | Status: FUNCTIONAL
Brand: LIGAMAX

## 2020-11-04 DEVICE — ABSORBABLE HEMOSTAT (OXIDIZED REGENERATED CELLULOSE, U.S.P.)
Type: IMPLANTABLE DEVICE | Site: ABDOMEN | Status: FUNCTIONAL
Brand: SURGICEL

## 2020-11-04 RX ORDER — PROMETHAZINE HYDROCHLORIDE 25 MG/1
25 TABLET ORAL ONCE AS NEEDED
Status: DISCONTINUED | OUTPATIENT
Start: 2020-11-04 | End: 2020-11-04 | Stop reason: HOSPADM

## 2020-11-04 RX ORDER — PROPOFOL 10 MG/ML
VIAL (ML) INTRAVENOUS AS NEEDED
Status: DISCONTINUED | OUTPATIENT
Start: 2020-11-04 | End: 2020-11-04 | Stop reason: SURG

## 2020-11-04 RX ORDER — FAMOTIDINE 20 MG/1
20 TABLET, FILM COATED ORAL 2 TIMES DAILY PRN
Status: DISCONTINUED | OUTPATIENT
Start: 2020-11-04 | End: 2020-11-05 | Stop reason: HOSPADM

## 2020-11-04 RX ORDER — MAGNESIUM HYDROXIDE 1200 MG/15ML
LIQUID ORAL AS NEEDED
Status: DISCONTINUED | OUTPATIENT
Start: 2020-11-04 | End: 2020-11-04 | Stop reason: HOSPADM

## 2020-11-04 RX ORDER — DEXAMETHASONE SODIUM PHOSPHATE 10 MG/ML
INJECTION INTRAMUSCULAR; INTRAVENOUS AS NEEDED
Status: DISCONTINUED | OUTPATIENT
Start: 2020-11-04 | End: 2020-11-04 | Stop reason: SURG

## 2020-11-04 RX ORDER — LABETALOL HYDROCHLORIDE 5 MG/ML
5 INJECTION, SOLUTION INTRAVENOUS
Status: DISCONTINUED | OUTPATIENT
Start: 2020-11-04 | End: 2020-11-04 | Stop reason: HOSPADM

## 2020-11-04 RX ORDER — NALOXONE HCL 0.4 MG/ML
0.2 VIAL (ML) INJECTION AS NEEDED
Status: DISCONTINUED | OUTPATIENT
Start: 2020-11-04 | End: 2020-11-04 | Stop reason: HOSPADM

## 2020-11-04 RX ORDER — NAPROXEN 250 MG/1
250 TABLET ORAL 2 TIMES DAILY PRN
Status: DISCONTINUED | OUTPATIENT
Start: 2020-11-04 | End: 2020-11-05 | Stop reason: HOSPADM

## 2020-11-04 RX ORDER — LIDOCAINE HYDROCHLORIDE 10 MG/ML
0.5 INJECTION, SOLUTION EPIDURAL; INFILTRATION; INTRACAUDAL; PERINEURAL ONCE AS NEEDED
Status: DISCONTINUED | OUTPATIENT
Start: 2020-11-04 | End: 2020-11-04 | Stop reason: HOSPADM

## 2020-11-04 RX ORDER — DIPHENHYDRAMINE HCL 25 MG
25 CAPSULE ORAL
Status: DISCONTINUED | OUTPATIENT
Start: 2020-11-04 | End: 2020-11-04 | Stop reason: HOSPADM

## 2020-11-04 RX ORDER — DIPHENHYDRAMINE HYDROCHLORIDE 50 MG/ML
12.5 INJECTION INTRAMUSCULAR; INTRAVENOUS
Status: DISCONTINUED | OUTPATIENT
Start: 2020-11-04 | End: 2020-11-04 | Stop reason: HOSPADM

## 2020-11-04 RX ORDER — FENTANYL CITRATE 50 UG/ML
INJECTION, SOLUTION INTRAMUSCULAR; INTRAVENOUS AS NEEDED
Status: DISCONTINUED | OUTPATIENT
Start: 2020-11-04 | End: 2020-11-04 | Stop reason: SURG

## 2020-11-04 RX ORDER — HYDROCODONE BITARTRATE AND ACETAMINOPHEN 7.5; 325 MG/1; MG/1
1 TABLET ORAL ONCE AS NEEDED
Status: DISCONTINUED | OUTPATIENT
Start: 2020-11-04 | End: 2020-11-04 | Stop reason: HOSPADM

## 2020-11-04 RX ORDER — LIDOCAINE HYDROCHLORIDE 20 MG/ML
INJECTION, SOLUTION INFILTRATION; PERINEURAL AS NEEDED
Status: DISCONTINUED | OUTPATIENT
Start: 2020-11-04 | End: 2020-11-04 | Stop reason: SURG

## 2020-11-04 RX ORDER — DOCUSATE SODIUM 100 MG/1
100 CAPSULE, LIQUID FILLED ORAL 2 TIMES DAILY PRN
Status: DISCONTINUED | OUTPATIENT
Start: 2020-11-04 | End: 2020-11-05 | Stop reason: HOSPADM

## 2020-11-04 RX ORDER — FLUMAZENIL 0.1 MG/ML
0.2 INJECTION INTRAVENOUS AS NEEDED
Status: DISCONTINUED | OUTPATIENT
Start: 2020-11-04 | End: 2020-11-04 | Stop reason: HOSPADM

## 2020-11-04 RX ORDER — OXYCODONE AND ACETAMINOPHEN 7.5; 325 MG/1; MG/1
1 TABLET ORAL ONCE AS NEEDED
Status: DISCONTINUED | OUTPATIENT
Start: 2020-11-04 | End: 2020-11-04 | Stop reason: HOSPADM

## 2020-11-04 RX ORDER — OXYCODONE HYDROCHLORIDE AND ACETAMINOPHEN 5; 325 MG/1; MG/1
2 TABLET ORAL EVERY 4 HOURS PRN
Status: DISCONTINUED | OUTPATIENT
Start: 2020-11-04 | End: 2020-11-05 | Stop reason: HOSPADM

## 2020-11-04 RX ORDER — GLYCOPYRROLATE 0.2 MG/ML
INJECTION INTRAMUSCULAR; INTRAVENOUS AS NEEDED
Status: DISCONTINUED | OUTPATIENT
Start: 2020-11-04 | End: 2020-11-04 | Stop reason: SURG

## 2020-11-04 RX ORDER — EPHEDRINE SULFATE 50 MG/ML
5 INJECTION, SOLUTION INTRAVENOUS ONCE AS NEEDED
Status: DISCONTINUED | OUTPATIENT
Start: 2020-11-04 | End: 2020-11-04 | Stop reason: HOSPADM

## 2020-11-04 RX ORDER — ONDANSETRON 2 MG/ML
4 INJECTION INTRAMUSCULAR; INTRAVENOUS ONCE AS NEEDED
Status: DISCONTINUED | OUTPATIENT
Start: 2020-11-04 | End: 2020-11-04 | Stop reason: HOSPADM

## 2020-11-04 RX ORDER — HYDROMORPHONE HCL 110MG/55ML
PATIENT CONTROLLED ANALGESIA SYRINGE INTRAVENOUS AS NEEDED
Status: DISCONTINUED | OUTPATIENT
Start: 2020-11-04 | End: 2020-11-04 | Stop reason: SURG

## 2020-11-04 RX ORDER — MIDAZOLAM HYDROCHLORIDE 1 MG/ML
1 INJECTION INTRAMUSCULAR; INTRAVENOUS
Status: DISCONTINUED | OUTPATIENT
Start: 2020-11-04 | End: 2020-11-04 | Stop reason: HOSPADM

## 2020-11-04 RX ORDER — SODIUM CHLORIDE, SODIUM LACTATE, POTASSIUM CHLORIDE, CALCIUM CHLORIDE 600; 310; 30; 20 MG/100ML; MG/100ML; MG/100ML; MG/100ML
100 INJECTION, SOLUTION INTRAVENOUS CONTINUOUS
Status: DISCONTINUED | OUTPATIENT
Start: 2020-11-04 | End: 2020-11-05 | Stop reason: HOSPADM

## 2020-11-04 RX ORDER — ONDANSETRON 4 MG/1
4 TABLET, FILM COATED ORAL EVERY 6 HOURS PRN
Status: DISCONTINUED | OUTPATIENT
Start: 2020-11-04 | End: 2020-11-05 | Stop reason: HOSPADM

## 2020-11-04 RX ORDER — ONDANSETRON 2 MG/ML
4 INJECTION INTRAMUSCULAR; INTRAVENOUS EVERY 6 HOURS PRN
Status: DISCONTINUED | OUTPATIENT
Start: 2020-11-04 | End: 2020-11-05 | Stop reason: HOSPADM

## 2020-11-04 RX ORDER — FENTANYL CITRATE 50 UG/ML
50 INJECTION, SOLUTION INTRAMUSCULAR; INTRAVENOUS
Status: DISCONTINUED | OUTPATIENT
Start: 2020-11-04 | End: 2020-11-04 | Stop reason: HOSPADM

## 2020-11-04 RX ORDER — ONDANSETRON 2 MG/ML
INJECTION INTRAMUSCULAR; INTRAVENOUS AS NEEDED
Status: DISCONTINUED | OUTPATIENT
Start: 2020-11-04 | End: 2020-11-04 | Stop reason: SURG

## 2020-11-04 RX ORDER — ROCURONIUM BROMIDE 10 MG/ML
INJECTION, SOLUTION INTRAVENOUS AS NEEDED
Status: DISCONTINUED | OUTPATIENT
Start: 2020-11-04 | End: 2020-11-04 | Stop reason: SURG

## 2020-11-04 RX ORDER — SODIUM CHLORIDE 0.9 % (FLUSH) 0.9 %
3-10 SYRINGE (ML) INJECTION AS NEEDED
Status: DISCONTINUED | OUTPATIENT
Start: 2020-11-04 | End: 2020-11-04 | Stop reason: HOSPADM

## 2020-11-04 RX ORDER — SODIUM CHLORIDE 0.9 % (FLUSH) 0.9 %
3 SYRINGE (ML) INJECTION EVERY 12 HOURS SCHEDULED
Status: DISCONTINUED | OUTPATIENT
Start: 2020-11-04 | End: 2020-11-04 | Stop reason: HOSPADM

## 2020-11-04 RX ORDER — CEFAZOLIN SODIUM 2 G/100ML
2 INJECTION, SOLUTION INTRAVENOUS ONCE
Status: COMPLETED | OUTPATIENT
Start: 2020-11-04 | End: 2020-11-04

## 2020-11-04 RX ORDER — NALOXONE HCL 0.4 MG/ML
0.4 VIAL (ML) INJECTION
Status: DISCONTINUED | OUTPATIENT
Start: 2020-11-04 | End: 2020-11-05 | Stop reason: HOSPADM

## 2020-11-04 RX ORDER — LIDOCAINE HYDROCHLORIDE 10 MG/ML
INJECTION, SOLUTION INFILTRATION; PERINEURAL AS NEEDED
Status: DISCONTINUED | OUTPATIENT
Start: 2020-11-04 | End: 2020-11-04 | Stop reason: HOSPADM

## 2020-11-04 RX ORDER — HYDROMORPHONE HYDROCHLORIDE 1 MG/ML
0.5 INJECTION, SOLUTION INTRAMUSCULAR; INTRAVENOUS; SUBCUTANEOUS
Status: DISCONTINUED | OUTPATIENT
Start: 2020-11-04 | End: 2020-11-04 | Stop reason: HOSPADM

## 2020-11-04 RX ORDER — HYDROMORPHONE HYDROCHLORIDE 1 MG/ML
0.5 INJECTION, SOLUTION INTRAMUSCULAR; INTRAVENOUS; SUBCUTANEOUS
Status: DISCONTINUED | OUTPATIENT
Start: 2020-11-04 | End: 2020-11-05 | Stop reason: HOSPADM

## 2020-11-04 RX ORDER — SODIUM CHLORIDE, SODIUM LACTATE, POTASSIUM CHLORIDE, CALCIUM CHLORIDE 600; 310; 30; 20 MG/100ML; MG/100ML; MG/100ML; MG/100ML
9 INJECTION, SOLUTION INTRAVENOUS CONTINUOUS
Status: DISCONTINUED | OUTPATIENT
Start: 2020-11-04 | End: 2020-11-04 | Stop reason: HOSPADM

## 2020-11-04 RX ORDER — PROMETHAZINE HYDROCHLORIDE 25 MG/1
25 SUPPOSITORY RECTAL ONCE AS NEEDED
Status: DISCONTINUED | OUTPATIENT
Start: 2020-11-04 | End: 2020-11-04 | Stop reason: HOSPADM

## 2020-11-04 RX ADMIN — FENTANYL CITRATE 50 MCG: 50 INJECTION, SOLUTION INTRAMUSCULAR; INTRAVENOUS at 09:30

## 2020-11-04 RX ADMIN — SODIUM CHLORIDE, POTASSIUM CHLORIDE, SODIUM LACTATE AND CALCIUM CHLORIDE 9 ML/HR: 600; 310; 30; 20 INJECTION, SOLUTION INTRAVENOUS at 06:55

## 2020-11-04 RX ADMIN — CEFAZOLIN SODIUM 2 G: 2 INJECTION, SOLUTION INTRAVENOUS at 07:31

## 2020-11-04 RX ADMIN — HYDROMORPHONE HYDROCHLORIDE 0.25 MG: 2 INJECTION, SOLUTION INTRAMUSCULAR; INTRAVENOUS; SUBCUTANEOUS at 09:24

## 2020-11-04 RX ADMIN — GLYCOPYRROLATE 0.4 MG: 0.2 INJECTION INTRAMUSCULAR; INTRAVENOUS at 08:49

## 2020-11-04 RX ADMIN — Medication 3 MG: at 08:49

## 2020-11-04 RX ADMIN — HYDROMORPHONE HYDROCHLORIDE 0.25 MG: 2 INJECTION, SOLUTION INTRAMUSCULAR; INTRAVENOUS; SUBCUTANEOUS at 09:06

## 2020-11-04 RX ADMIN — NAPROXEN 250 MG: 250 TABLET ORAL at 18:05

## 2020-11-04 RX ADMIN — SODIUM CHLORIDE, POTASSIUM CHLORIDE, SODIUM LACTATE AND CALCIUM CHLORIDE 100 ML/HR: 600; 310; 30; 20 INJECTION, SOLUTION INTRAVENOUS at 11:43

## 2020-11-04 RX ADMIN — FENTANYL CITRATE 25 MCG: 50 INJECTION INTRAMUSCULAR; INTRAVENOUS at 08:59

## 2020-11-04 RX ADMIN — FENTANYL CITRATE 50 MCG: 50 INJECTION, SOLUTION INTRAMUSCULAR; INTRAVENOUS at 09:35

## 2020-11-04 RX ADMIN — HYDROMORPHONE HYDROCHLORIDE 0.5 MG: 1 INJECTION, SOLUTION INTRAMUSCULAR; INTRAVENOUS; SUBCUTANEOUS at 11:25

## 2020-11-04 RX ADMIN — PROPOFOL 150 MG: 10 INJECTION, EMULSION INTRAVENOUS at 07:32

## 2020-11-04 RX ADMIN — SODIUM CHLORIDE, POTASSIUM CHLORIDE, SODIUM LACTATE AND CALCIUM CHLORIDE: 600; 310; 30; 20 INJECTION, SOLUTION INTRAVENOUS at 09:05

## 2020-11-04 RX ADMIN — DEXAMETHASONE SODIUM PHOSPHATE 8 MG: 10 INJECTION INTRAMUSCULAR; INTRAVENOUS at 07:37

## 2020-11-04 RX ADMIN — DOCUSATE SODIUM 100 MG: 100 CAPSULE, LIQUID FILLED ORAL at 21:39

## 2020-11-04 RX ADMIN — HYDROMORPHONE HYDROCHLORIDE 0.25 MG: 2 INJECTION, SOLUTION INTRAMUSCULAR; INTRAVENOUS; SUBCUTANEOUS at 09:14

## 2020-11-04 RX ADMIN — FENTANYL CITRATE 25 MCG: 50 INJECTION INTRAMUSCULAR; INTRAVENOUS at 08:56

## 2020-11-04 RX ADMIN — MIDAZOLAM 1 MG: 1 INJECTION INTRAMUSCULAR; INTRAVENOUS at 06:55

## 2020-11-04 RX ADMIN — SODIUM CHLORIDE, POTASSIUM CHLORIDE, SODIUM LACTATE AND CALCIUM CHLORIDE 100 ML/HR: 600; 310; 30; 20 INJECTION, SOLUTION INTRAVENOUS at 16:11

## 2020-11-04 RX ADMIN — OXYCODONE HYDROCHLORIDE AND ACETAMINOPHEN 2 TABLET: 5; 325 TABLET ORAL at 12:01

## 2020-11-04 RX ADMIN — FENTANYL CITRATE 50 MCG: 50 INJECTION INTRAMUSCULAR; INTRAVENOUS at 07:56

## 2020-11-04 RX ADMIN — FENTANYL CITRATE 50 MCG: 50 INJECTION INTRAMUSCULAR; INTRAVENOUS at 07:32

## 2020-11-04 RX ADMIN — OXYCODONE HYDROCHLORIDE AND ACETAMINOPHEN 2 TABLET: 5; 325 TABLET ORAL at 21:39

## 2020-11-04 RX ADMIN — ROCURONIUM BROMIDE 40 MG: 10 INJECTION, SOLUTION INTRAVENOUS at 07:32

## 2020-11-04 RX ADMIN — LIDOCAINE HYDROCHLORIDE 80 MG: 20 INJECTION, SOLUTION INFILTRATION; PERINEURAL at 07:32

## 2020-11-04 RX ADMIN — HYDROMORPHONE HYDROCHLORIDE 0.25 MG: 2 INJECTION, SOLUTION INTRAMUSCULAR; INTRAVENOUS; SUBCUTANEOUS at 09:20

## 2020-11-04 RX ADMIN — ONDANSETRON HYDROCHLORIDE 4 MG: 2 SOLUTION INTRAMUSCULAR; INTRAVENOUS at 08:27

## 2020-11-04 RX ADMIN — FENTANYL CITRATE 50 MCG: 50 INJECTION INTRAMUSCULAR; INTRAVENOUS at 08:40

## 2020-11-04 NOTE — ANESTHESIA PROCEDURE NOTES
Airway  Urgency: elective    Date/Time: 11/4/2020 7:36 AM  Airway not difficult    General Information and Staff    Patient location during procedure: OR  Anesthesiologist: Dano Cain MD  CRNA: Alyssa Hernandez CRNA    Indications and Patient Condition  Indications for airway management: airway protection    Preoxygenated: yes  MILS not maintained throughout  Mask difficulty assessment: 2 - vent by mask + OA or adjuvant +/- NMBA    Final Airway Details  Final airway type: endotracheal airway      Successful airway: ETT  Cuffed: yes   Successful intubation technique: direct laryngoscopy  Facilitating devices/methods: intubating stylet  Endotracheal tube insertion site: oral  Blade: Griffin  Blade size: 3  ETT size (mm): 7.0  Cormack-Lehane Classification: grade I - full view of glottis  Placement verified by: chest auscultation and capnometry   Measured from: lips  ETT/EBT  to lips (cm): 22  Number of attempts at approach: 1  Assessment: lips, teeth, and gum same as pre-op and atraumatic intubation

## 2020-11-04 NOTE — ANESTHESIA POSTPROCEDURE EVALUATION
Patient: Mariaelena Mack    Procedure Summary     Date: 11/04/20 Room / Location:  [facility]  /  [facility]    Anesthesia Start: 0725 Anesthesia Stop: 0924    Procedure: LAPAROSCOPIC ASSISTED VAGINAL HYSTERECTOMY BILATERAL SALPINGECTOMY (Bilateral Abdomen) Diagnosis:     Surgeon: Soraida Jay MD Provider: Dano Cain MD    Anesthesia Type: general ASA Status: 2          Anesthesia Type: general    Vitals  Vitals Value Taken Time   /70 11/04/20 1045   Temp 36.2 °C (97.2 °F) 11/04/20 0955   Pulse 71 11/04/20 1053   Resp 18 11/04/20 1045   SpO2 99 % 11/04/20 1054   Vitals shown include unvalidated device data.        Post Anesthesia Care and Evaluation    Patient location during evaluation: bedside  Patient participation: complete - patient participated  Level of consciousness: awake  Pain management: adequate  Airway patency: patent  Anesthetic complications: No anesthetic complications    Cardiovascular status: acceptable  Respiratory status: acceptable  Hydration status: acceptable    Comments: */70   Pulse 66   Temp 36.2 °C (97.2 °F) (Temporal)   Resp 18   SpO2 98%

## 2020-11-04 NOTE — ANESTHESIA PREPROCEDURE EVALUATION
Anesthesia Evaluation     Patient summary reviewed and Nursing notes reviewed   no history of anesthetic complications:  NPO Solid Status: > 8 hours  NPO Liquid Status: > 2 hours           Airway   Mallampati: II  TM distance: >3 FB  Neck ROM: full  no difficulty expected  Dental - normal exam     Pulmonary - normal exam   (+) a smoker Former,   (-) COPD, asthma, lung cancer  Cardiovascular - normal exam  Exercise tolerance: good (4-7 METS)    ECG reviewed  Rhythm: regular  Rate: normal    (+) hyperlipidemia,  carotid artery disease  (-) hypertension, valvular problems/murmurs, past MI, CAD, dysrhythmias, angina, CHF, cardiac stents, CABG    ROS comment: S/p PFO closure 2018.    TTE 2019:  ·Limited echo for left ventricular function and ASD closure device  ·Left ventricular systolic function is normal. Calculated EF = 62%.  ·Normal right ventricular cavity size and systolic function noted.  ·ASD or PFO closure device in interatrial septum.  ·Saline test results are negative.  ·Mild tricuspid valve regurgitation is present  ·Calculated right ventricular systolic pressure from tricuspid regurgitation is 34 mmHg.  ·There is no evidence of pericardial effusion.    Neuro/Psych  (+) CVA, numbness, psychiatric history Anxiety,     (-) seizures, TIA    ROS Comment: Pt w/ known hx/o CVA 2017 w/ full recovery.  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  GI/Hepatic/Renal/Endo    (+) obesity,     (-) hiatal hernia, GERD, PUD, hepatitis, liver disease, no renal disease, diabetes, GI bleed, no thyroid disorder    Musculoskeletal (-) negative ROS    Abdominal  - normal exam   Substance History - negative use     OB/GYN          Other   blood dyscrasia anemia,                     Anesthesia Plan    ASA 2     general     intravenous induction     Anesthetic plan, all risks, benefits, and alternatives have been provided, discussed and informed consent has been obtained with: patient.    Plan discussed with CRNA and  attending.

## 2020-11-04 NOTE — PLAN OF CARE
Goal Outcome Evaluation:  Plan of Care Reviewed With: patient  Progress: improving  Outcome Summary: Lap sites CD&I, no vaginal bleeding present. C/o pain upon arrival from PACU, PO pain medication given and patient states good pain relief. Tolerating regular diet. Vasquez catheter in place, no order to remove. VSS. IVF infusing. IS up to 2000.

## 2020-11-04 NOTE — PERIOPERATIVE NURSING NOTE
Pt arrived to PACU with 1 unit PRBCs infusing (2nd of 2 units ordered). VSS. No suspected reaction. Will continue to monitor.

## 2020-11-04 NOTE — H&P
Patient Care Team:  Vera Cordova PA-C as PCP - General (Family Medicine)  Soraida Jay MD as Consulting Physician (Obstetrics and Gynecology)  William Patino MD as Consulting Physician (Cardiology)  Margareth Miller APRN as Nurse Practitioner (Nurse Practitioner)    Chief complaint    Pt with long hx of severely heavy periods. She has been admitted several times for blood transfusions. She has known hx of adenomyosis and fibroids. Uterus is enlarged. 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   • Menstrual problem    • PFO (patent foramen ovale)     SURGERY REPAIR 2008   • Sciatica of left side     DURING NIGHT   • Uterine fibroid      Past Surgical History:   Procedure Laterality Date   • CARDIAC CATHETERIZATION N/A 11/9/2018    Procedure: Septal Defect Closure;  Surgeon: Kadeem Greene MD;  Location: [facility] CATH INVASIVE LOCATION;  Service: Cardiology   • CEREBRAL ANEURYSM REPAIR  11/2007   • CEREBRAL ANGIOGRAM N/A 6/11/2018    Procedure: CEREBRAL ANGIOGRAM;  Surgeon: Roger Day MD;  Location: [facility] OR 18/19;  Service: Neurosurgery   • D&C HYSTEROSCOPY ENDOMETRIAL ABLATION N/A 6/26/2019    Procedure: DILATATION AND CURETTAGE, HYSTEROSCOPY =, MYOMECTOMY USING MYOSURE;  Surgeon: Soraida Jay MD;  Location: [facility] OR [facility];  Service: Obstetrics/Gynecology   • EMBOLIZATION CEREBRAL N/A 11/2/2017    Procedure: Cerebral angiogram and embolization of cerebral angiogram with Pipeline Embolization Device and coils.;  Surgeon: Roger Day MD;  Location: [facility] OR 18/19;  Service:    • PATENT FORAMEN OVALE CLOSURE  11/2018      Medications Prior to Admission   Medication Sig Dispense Refill Last Dose   • atorvastatin (LIPITOR) 20 MG tablet Take 1 tablet by mouth Every Night. 90 tablet 3 11/3/2020 at Unknown time   • cetirizine (zyrTEC) 10 MG tablet Take 10 mg by mouth Daily As Needed for Allergies.   Past Week at Unknown time   • Ferrous Sulfate (IRON PO) Take 1 tablet by mouth Every Other Day.   11/3/2020 at Unknown time   • FOLIC ACID PO Take 1 tablet by mouth Daily. UNSURE OF DOSE   11/3/2020 at Unknown time   • norethindrone (AYGESTIN) 5 MG tablet Take 10 mg by mouth Daily.   11/3/2020 at Unknown time   • aspirin 81 MG EC tablet Take 81 mg by mouth Daily. STOP FOR SURGERY   11/2/2020   • ibuprofen (ADVIL,MOTRIN) 200 MG tablet Take 200 mg by mouth As Needed. HOLD PRIOR TO SURGERY   More than a month at Unknown time   • vitamin B-12 (CYANOCOBALAMIN) 1000 MCG tablet Take 1,000 mcg by mouth Daily. HOLDING FOR SURGERY   10/28/2020     Allergies:  Patient has no known allergies.    Objective     Vital Signs  Temp:  [98.5 °F (36.9 °C)] 98.5 °F (36.9 °C)  Heart Rate:  [78] 78  Resp:  [20] 20  BP: (100)/(75) 100/75    Physical Exam:    No exam performed today,    Results Review:    I reviewed the patient's new clinical results.    Assessment/Plan       Fibroids  with abnormal uterine bleeding and anemia. Hg today is 9. She is aware and okay with an intraoperative blood transfusion. She is type and crossed for 2units. dw lavh/bilateral salpingectomy. rev'd the risks at length    I discussed the patients findings and my recommendations with patient.     Soraida Jay MD  11/04/20  07:23 EST

## 2020-11-05 VITALS
OXYGEN SATURATION: 99 % | DIASTOLIC BLOOD PRESSURE: 70 MMHG | TEMPERATURE: 98.3 F | WEIGHT: 214.95 LBS | HEIGHT: 66 IN | SYSTOLIC BLOOD PRESSURE: 97 MMHG | BODY MASS INDEX: 34.55 KG/M2 | HEART RATE: 81 BPM | RESPIRATION RATE: 16 BRPM

## 2020-11-05 LAB
BASOPHILS # BLD AUTO: 0.02 10*3/MM3 (ref 0–0.2)
BASOPHILS NFR BLD AUTO: 0.2 % (ref 0–1.5)
BH BB BLOOD EXPIRATION DATE: NORMAL
BH BB BLOOD EXPIRATION DATE: NORMAL
BH BB BLOOD TYPE BARCODE: 5100
BH BB BLOOD TYPE BARCODE: 5100
BH BB DISPENSE STATUS: NORMAL
BH BB DISPENSE STATUS: NORMAL
BH BB PRODUCT CODE: NORMAL
BH BB PRODUCT CODE: NORMAL
BH BB UNIT NUMBER: NORMAL
BH BB UNIT NUMBER: NORMAL
CROSSMATCH INTERPRETATION: NORMAL
CROSSMATCH INTERPRETATION: NORMAL
CYTO UR: NORMAL
DEPRECATED RDW RBC AUTO: 56.1 FL (ref 37–54)
EOSINOPHIL # BLD AUTO: 0 10*3/MM3 (ref 0–0.4)
EOSINOPHIL NFR BLD AUTO: 0 % (ref 0.3–6.2)
ERYTHROCYTE [DISTWIDTH] IN BLOOD BY AUTOMATED COUNT: 18.8 % (ref 12.3–15.4)
HCT VFR BLD AUTO: 30.2 % (ref 34–46.6)
HGB BLD-MCNC: 8.8 G/DL (ref 12–15.9)
IMM GRANULOCYTES # BLD AUTO: 0.1 10*3/MM3 (ref 0–0.05)
IMM GRANULOCYTES NFR BLD AUTO: 0.8 % (ref 0–0.5)
LAB AP CASE REPORT: NORMAL
LYMPHOCYTES # BLD AUTO: 1.07 10*3/MM3 (ref 0.7–3.1)
LYMPHOCYTES NFR BLD AUTO: 8.6 % (ref 19.6–45.3)
MCH RBC QN AUTO: 23.8 PG (ref 26.6–33)
MCHC RBC AUTO-ENTMCNC: 29.1 G/DL (ref 31.5–35.7)
MCV RBC AUTO: 81.6 FL (ref 79–97)
MONOCYTES # BLD AUTO: 0.84 10*3/MM3 (ref 0.1–0.9)
MONOCYTES NFR BLD AUTO: 6.7 % (ref 5–12)
NEUTROPHILS NFR BLD AUTO: 10.48 10*3/MM3 (ref 1.7–7)
NEUTROPHILS NFR BLD AUTO: 83.7 % (ref 42.7–76)
NRBC BLD AUTO-RTO: 0 /100 WBC (ref 0–0.2)
PATH REPORT.FINAL DX SPEC: NORMAL
PATH REPORT.GROSS SPEC: NORMAL
PLATELET # BLD AUTO: 318 10*3/MM3 (ref 140–450)
PMV BLD AUTO: 10.9 FL (ref 6–12)
RBC # BLD AUTO: 3.7 10*6/MM3 (ref 3.77–5.28)
UNIT  ABO: NORMAL
UNIT  ABO: NORMAL
UNIT  RH: NORMAL
UNIT  RH: NORMAL
WBC # BLD AUTO: 12.51 10*3/MM3 (ref 3.4–10.8)

## 2020-11-05 PROCEDURE — 90686 IIV4 VACC NO PRSV 0.5 ML IM: CPT | Performed by: OBSTETRICS & GYNECOLOGY

## 2020-11-05 PROCEDURE — 25010000002 INFLUENZA VAC SPLIT QUAD 0.5 ML SUSPENSION PREFILLED SYRINGE: Performed by: OBSTETRICS & GYNECOLOGY

## 2020-11-05 PROCEDURE — 85025 COMPLETE CBC W/AUTO DIFF WBC: CPT | Performed by: OBSTETRICS & GYNECOLOGY

## 2020-11-05 PROCEDURE — G0008 ADMIN INFLUENZA VIRUS VAC: HCPCS | Performed by: OBSTETRICS & GYNECOLOGY

## 2020-11-05 RX ORDER — NAPROXEN 250 MG/1
250 TABLET ORAL 2 TIMES DAILY PRN
Qty: 60 TABLET | Refills: 0 | Status: SHIPPED | OUTPATIENT
Start: 2020-11-05 | End: 2022-09-27

## 2020-11-05 RX ADMIN — SODIUM CHLORIDE, POTASSIUM CHLORIDE, SODIUM LACTATE AND CALCIUM CHLORIDE 100 ML/HR: 600; 310; 30; 20 INJECTION, SOLUTION INTRAVENOUS at 03:07

## 2020-11-05 RX ADMIN — INFLUENZA VIRUS VACCINE 0.5 ML: 15; 15; 15; 15 SUSPENSION INTRAMUSCULAR at 12:34

## 2020-11-05 RX ADMIN — OXYCODONE HYDROCHLORIDE AND ACETAMINOPHEN 2 TABLET: 5; 325 TABLET ORAL at 10:25

## 2020-11-05 NOTE — PROGRESS NOTES
Continued Stay Note  [facility]     Patient Name: Mariaelena Mack  MRN: [id number]  Today's Date: 11/5/2020    Admit Date: 11/4/2020    Discharge Plan     Row Name 11/05/20 1156       Plan    Plan  Home no needs    Plan Comments  Discharge order noted. Met with patient who confirmed DC plan is home. Stated family will assist as needed and her sister will provide transportation at DC. Denies any needs/equipment.        Discharge Codes    No documentation.       Expected Discharge Date and Time     Expected Discharge Date Expected Discharge Time    Nov 5, 2020             Saira Talbert RN

## 2020-11-05 NOTE — PROGRESS NOTES
Case Management Discharge Note      Final Note: Discharged home. Saira Talbert, WILBER         Transportation Services  Private: Car    Final Discharge Disposition Code: 01 - home or self-care

## 2020-11-05 NOTE — DISCHARGE SUMMARY
[facility]  POST OP  PROGRESS NOTE    Patient Name: Mariaelena Mack  :  1975  MRN:  [id number]      POD1   Subjective     Patient reports:    Pain is well controlled. Denies nausea and vomiting. Tolerating po.   Voiding and ambulating without difficulty.       Objective       Vitals: Vital Signs Range for the last 24 hours  Temperature: Temp:  [97.2 °F (36.2 °C)-99.3 °F (37.4 °C)] 98.1 °F (36.7 °C)       BP: BP: ()/(54-70) 97/70   Pulse: Heart Rate:  [64-81] 81   Respirations: Resp:  [14-18] 16         Intake/Output Summary (Last 24 hours) at 2020 1031  Last data filed at 2020 0900  Gross per 24 hour   Intake 2391 ml   Output 4550 ml   Net -2159 ml                                             Physical Exam     General  Alert in NAD    Abdomen Soft, non-distended, appropriately tender    Incision  Clean, dry and intact     Extremities Calves NT bilaterally          Lab results reviewed:  CBC:   Lab Results   Component Value Date    WBC 12.51 (H) 2020    HGB 8.8 (L) 2020    HCT 30.2 (L) 2020          Assessment/Plan     POD 1 - Doing well. Hemodynamically stable. Intraoperative    findings reviewed with the patient.  Post op anemia- rec po iron upon discharge        Plan:  Stable for discharge. Post op instruction discussed. Follow up in 2 weeks.          Fibroids    Abnormal uterine bleeding                   GRAY Carr  2020  10:31 EST

## 2020-11-05 NOTE — PLAN OF CARE
Goal Outcome Evaluation:  Plan of Care Reviewed With: patient  Progress: improving  Outcome Summary: Lap sites x3 with dermabond are clean, dry and intact, adequate pain control with percocet, vss, afebrile, ambulated in wills, using IS, dorantes has good urine output.

## 2020-11-07 NOTE — OP NOTE
Pre op dx.. .large fibroids, anemia, abnormal uterine bleeding, pelvic pain    Post op dx... same    Procedure.. lavh/bilateral salpingectomy    Ebl.. 400cc    Surgeon.. mayra gillespie md    Asst... ivett leigh md    Anesthesia.. geta    Findings.. enlarged uterus, boggy                    Pt taken to or where geta obtained without difficulty. Placed in dorsal lithotomy position where left arm was tucked and was prepped draped in ssf.  hulka tenac placed. A vertical, infraumbilical incisino was made. Fascia identified and opened. Stay sutures placed on either side. Abdomen entered bluntly. nadia trocar inserted and co2 gas insufflated to 15mmhg.  Laparoscope introduced. 2 5mm trocars placed under direct visualation in suprapubic and llq.  Using the enseal the mesosalpinx, round ligaments, utero-ovarian ligaments were all cauterized and incised.  There was some brisk back bleeding from the single tooth tenacs and the uterus itself. These areas were cauterized and clips were placed. Given the patients anemia, history of a stroke.. she was transfused intraoperatively.  This had been discussed pre operatively and she was already type and crossed for 2units.  The broad ligament was opened and the anterior peritoneum was opened. The uterus was very enlarged. Both ovaries appeared normal. The fallopian tubes were excised in their entirety.  Attention was turned to the vagina. An incision was made at the cervicovaginal junction. The posterior and anterior cuffs were opened. Uterosacral ligaments were grasped with a clamp, cut and suture ligated with a 0vicryl. The cardinal ligaments were then grasped, cut and suture ligated. The uterine vessels were clamped, cut and suture ligated followed by the remaining pedicle.  The uterus was morcellated through the vagina without difficulty. All pieces removed and sent to pathology. No bleeding was noted. The vaginal incision was closed with 0vciryls in figure 8 fashion.  Vasquez  was placed. Attention was turned to the abdomen. No bleeding was noted. All instruments removed. Fascia was closed with a 0vicryl and skin incisions were closed with a 4-0vicryl. All counts were correct.  Pt did very well during the surgery hemodynamically.  mayra gillespie md

## 2020-12-07 ENCOUNTER — APPOINTMENT (OUTPATIENT)
Dept: WOMENS IMAGING | Facility: HOSPITAL | Age: 45
End: 2020-12-07

## 2020-12-07 PROCEDURE — 77067 SCR MAMMO BI INCL CAD: CPT | Performed by: RADIOLOGY

## 2021-04-01 ENCOUNTER — OFFICE VISIT (OUTPATIENT)
Dept: NEUROLOGY | Facility: CLINIC | Age: 46
End: 2021-04-01

## 2021-04-01 VITALS
HEART RATE: 75 BPM | HEIGHT: 66 IN | WEIGHT: 220 LBS | OXYGEN SATURATION: 97 % | BODY MASS INDEX: 35.36 KG/M2 | SYSTOLIC BLOOD PRESSURE: 120 MMHG | DIASTOLIC BLOOD PRESSURE: 72 MMHG

## 2021-04-01 DIAGNOSIS — E66.09 CLASS 2 OBESITY DUE TO EXCESS CALORIES WITH BODY MASS INDEX (BMI) OF 36.0 TO 36.9 IN ADULT, UNSPECIFIED WHETHER SERIOUS COMORBIDITY PRESENT: ICD-10-CM

## 2021-04-01 DIAGNOSIS — E53.8 B12 DEFICIENCY: ICD-10-CM

## 2021-04-01 DIAGNOSIS — Z86.79 HX OF CEREBRAL ANEURYSM REPAIR: ICD-10-CM

## 2021-04-01 DIAGNOSIS — F41.9 ANXIETY: ICD-10-CM

## 2021-04-01 DIAGNOSIS — Z98.890 HX OF CEREBRAL ANEURYSM REPAIR: ICD-10-CM

## 2021-04-01 DIAGNOSIS — I67.9 CEREBROVASCULAR DISEASE: Primary | ICD-10-CM

## 2021-04-01 PROCEDURE — 99214 OFFICE O/P EST MOD 30 MIN: CPT | Performed by: NURSE PRACTITIONER

## 2021-04-01 NOTE — PROGRESS NOTES
DOS: 2021  NAME: Mariaelena Mack   : 1975  PCP: Vera Cordova PA-C    Chief Complaint   Patient presents with   • Stroke      SUBJECTIVE  Neurological Problem:  46 y.o. RHW female with h/o LMCA stroke, large left ICA aneurysm (s/p embolization),  B12 def and h/o migraines, and tobacco abuse who presents today for stroke follow-up.  She is unaccompanied.    Interval History:   **For full history, please see progress note dated 2019.    Ms. Mack has a h/o left MCA infarct she suffered in 2017, etiology likely due to thrombosis of aneurysm that was treated by Dr. Day. However, a cardioembolic/paradoxical source could not be ruled out given the finding of PFO/ASD and she had closure device placement in 2018.  She has since been maintained on ASA and Lipitor with no recurrent or new stroke/TIA symptoms.  She has essentially remained at her neurologic baseline he has no residuals, last seen here in 2020.     Presents today, continues on ASA, Lipitor 20, tolerating medications well.  She denies any new stroke/TIA symptoms.  She had an hysterectomy back in November, no complications.  Review of labs from 2020 shows a CMP that is unremarkable, hemoglobin A1c of 4.4; TSH 1.42; the panel with a total of 135, HDL 89, LDL 36, TG 52.  She does report having some anxiety, is not currently on any medication, does not see a therapist.  She denies any other changes in her health since her last visit.  She has had no issues with migraines since her PFO closure. She is followed by cardiology but has not seen them since 2019.  She is scheduled to have a follow-up CTA head for aneurysm surveillance by neurosurgery in  of this year.  She states her BP is well controlled.  She denies any signs or symptoms of sleep apnea. She denies smoking.  Occasional alcohol use.  No falls.    Review of Systems:Review of Systems   Constitutional: Negative for activity change, appetite change  and fatigue.   HENT: Negative for ear pain, tinnitus and trouble swallowing.    Eyes: Negative for photophobia, pain and visual disturbance.   Musculoskeletal: Negative for back pain, gait problem and neck pain.   Neurological: Negative for dizziness, tremors, seizures, syncope, facial asymmetry, speech difficulty, weakness, light-headedness, numbness and headaches.   Psychiatric/Behavioral: Negative for agitation, behavioral problems, confusion, decreased concentration, dysphoric mood, hallucinations, self-injury, sleep disturbance and suicidal ideas. The patient is nervous/anxious. The patient is not hyperactive.     Above ROS reviewed    The following portions of the patient's history were reviewed and updated as appropriate: allergies, current medications, past family history, past medical history, past social history, past surgical history and problem list.    Current Medications:   Current Outpatient Medications:   •  aspirin 81 MG EC tablet, Take 81 mg by mouth Daily. STOP FOR SURGERY, Disp: , Rfl:   •  atorvastatin (LIPITOR) 20 MG tablet, Take 1 tablet by mouth Every Night., Disp: 90 tablet, Rfl: 3  •  cetirizine (zyrTEC) 10 MG tablet, Take 10 mg by mouth Daily As Needed for Allergies., Disp: , Rfl:   •  Ferrous Sulfate (IRON PO), Take 1 tablet by mouth Every Other Day., Disp: , Rfl:   •  FOLIC ACID PO, Take 1 tablet by mouth Daily. UNSURE OF DOSE, Disp: , Rfl:   •  ibuprofen (ADVIL,MOTRIN) 200 MG tablet, Take 200 mg by mouth As Needed. HOLD PRIOR TO SURGERY, Disp: , Rfl:   •  naproxen (NAPROSYN) 250 MG tablet, Take 1 tablet by mouth 2 (Two) Times a Day As Needed for Mild Pain ., Disp: 60 tablet, Rfl: 0  •  oxyCODONE-acetaminophen (Percocet) 5-325 MG per tablet, Take 1-2 tablets by mouth Every 4 (Four) Hours As Needed for pain., Disp: 30 tablet, Rfl: 0  •  vitamin B-12 (CYANOCOBALAMIN) 1000 MCG tablet, Take 1,000 mcg by mouth Daily. HOLDING FOR SURGERY, Disp: , Rfl:   •  VITAMIN D PO, Take  by mouth. Every  other day, Disp: , Rfl:   **I did not stop or change the above medications.  Patient's medication list was updated to reflect medications they have reported as currently taking, including medication changes made by other providers.    OBJECTIVE  Vitals:    04/01/21 0843   BP: 120/72   Pulse: 75   SpO2: 97%     Body mass index is 35.53 kg/m².    Diagnostics:    Laboratory Results:         Lab Results   Component Value Date    WBC 12.51 (H) 11/05/2020    HGB 8.8 (L) 11/05/2020    HCT 30.2 (L) 11/05/2020    MCV 81.6 11/05/2020     11/05/2020     Lab Results   Component Value Date    GLUCOSE 97 11/02/2020    BUN 6 11/02/2020    CREATININE 0.73 11/02/2020    EGFRIFNONA 86 11/02/2020    EGFRIFAFRI 106 09/03/2020    BCR 8.2 11/02/2020    K 4.1 11/02/2020    CO2 21.1 (L) 11/02/2020    CALCIUM 9.1 11/02/2020    PROTENTOTREF 6.3 09/03/2020    ALBUMIN 4.50 09/04/2020    LABIL2 2.5 09/03/2020    AST 19 09/04/2020    ALT 12 09/04/2020     Lab Results   Component Value Date    HGBA1C 4.40 (L) 09/03/2020     Lab Results   Component Value Date    CHOL 149 02/14/2019    CHOL 179 10/29/2017     Lab Results   Component Value Date    HDL 89 (H) 09/03/2020    HDL 94 (H) 02/14/2019    HDL 94 (H) 10/29/2017     Lab Results   Component Value Date    LDL 36 09/03/2020    LDL 45 02/14/2019    LDL 72 10/29/2017     Lab Results   Component Value Date    TRIG 52 09/03/2020    TRIG 50 02/14/2019    TRIG 64 10/29/2017     No results found for: RPR  Lab Results   Component Value Date    TSH 1.420 09/03/2020     Lab Results   Component Value Date    OPQTHCSN81 952 (H) 09/03/2020     Physical Exam:  GENERAL: NAD, obese  HEENT: Normocephalic, atraumatic   COR: RRR  Resp: Even and unlabored  Extremities: No signs of distal embolization.   Skin: No rashes, lesions or ulcers.  Psychiatric: Normal mood and affect.    Neurological:   MS: AO. Language normal. No neglect. Follows all commands.  CN: II-XII grossly normal  Motor: Normal strength and  tone throughout.  Sensory: Intact to light touch in arms and legs  Station and Gait: Normal gait and station.    Coordination: Normal finger to nose bilaterally    Impression:  Ms. Mack presents for f/u stoke, doing well on ASA plus statin with no recurrent or new stroke/TIA symptoms and a normal neurologic exam today.  We again reviewed her vascular risk factors and importance of RF control for stroke prevention.  Recommend she follow-up with cardiology as she is not seeing them 2019.  She also has scheduled aneurysm surveillance imaging for June of this year.  We reviewed the signs symptoms of stroke and the importance calling 911 if she would develop any of these.  She will follow-up here as needed.  Patient voiced understanding and agrees with plan.,     Plan:     1. LMCA stroke  Continue ASA, Lipitor, B12 replacement  Follow-up with PCP for continued cholesterol surveillance, B12 monitoring  Monitor BP regularly  Encouraged regular physical activity, healthy diet  Secondary stroke prevention: Ideal targets for stroke prevention would be Blood pressure < 130/80; B12 > 500 TSH in normal range and LDL < 70; HbA1c < 6.5 and smoking cessation if applicable.  Call 911 for stroke symptoms  Follow-up as needed    2. Left ICA aneurysm, s/p coil embolization  Followed by neurosurgery, surveillance CTA head scheduled for June 2021    3. Anxiety  Patient to consider therapist    I spent a total of 30 minutes today in reviewing records, prior diagnostics, examination of patient as well as counseling and educating patient regarding diagnoses, symptoms, reviewing diagnostics with patient, pharmacologic treatment options including purpose, risk, benefits, possible side-effects, recommendations, lifestyle modifications, coordination of care and documenting plan of care.        Diagnoses and all orders for this visit:    1. Cerebrovascular disease (Primary)    2. Hx of cerebral aneurysm repair    3. Class 2 obesity due to  excess calories with body mass index (BMI) of 36.0 to 36.9 in adult, unspecified whether serious comorbidity present    4. B12 deficiency    5. Anxiety        Coding      Dictated using Dragon

## 2021-04-02 ENCOUNTER — BULK ORDERING (OUTPATIENT)
Dept: CASE MANAGEMENT | Facility: OTHER | Age: 46
End: 2021-04-02

## 2021-04-02 DIAGNOSIS — Z23 IMMUNIZATION DUE: ICD-10-CM

## 2021-04-06 DIAGNOSIS — I67.1 INTRACRANIAL ANEURYSM: Primary | ICD-10-CM

## 2021-04-15 ENCOUNTER — TELEPHONE (OUTPATIENT)
Dept: NEUROSURGERY | Facility: CLINIC | Age: 46
End: 2021-04-15

## 2021-04-15 NOTE — TELEPHONE ENCOUNTER
Pt called: she needs to schedule her recall appt with Dr. Crow. She is scheduled to have her CTA on 6/11/21. Please advise!      Pt contact # [phone]  Best time to call: anytime

## 2021-05-07 NOTE — PROGRESS NOTES
CC:   Chief Complaint   Patient presents with   • Follow-Up   • Rash          HISTORY OF PRESENT ILLNESS: Patient is a 71 y.o. male established patient who presents today acute complaint of rash.        Pain in right testicle  Last visit treated patient with bactrim for possible epididymitis. Patient has an appointment with Dr. Brooke next month. Stopped antibiotic yesterday because of rash. Was 1 day shy of finishing regimen. Tuesday night had fever and chills, come and goes. Wednesday and Thursday had fever and chills as well. None today. Some diarrhea 2 days ago, now resolved. No blood in the stool. No cough, sore-throat, ear pain, congestion. Slight headache. Some muscle aches in his arms. This has been going on for a while- months. Some fatigue as well.     Rash  Rash started a couple of days ago, he stopped the antibiotic yesterday because of the rash.  Does not itch.  Covers his arms, chest, abdomen and back.  Patient has not started his new cholesterol medication yet because of the rash.      Patient Active Problem List    Diagnosis Date Noted   • Rash 05/07/2021   • Pain in right testicle 04/22/2021   • Prediabetes 10/12/2020   • Vitamin D deficiency 09/10/2020   • Seborrheic keratoses 09/10/2020   • Fatigue 09/10/2020   • Vertigo 08/19/2019   • Smoker 08/19/2019   • Elevated glucose 07/28/2016   • Hyperlipidemia 06/07/2016   • Major depressive disorder 06/07/2016   • Erectile dysfunction 06/07/2016   • Prostate cancer (HCC) 01/20/2015      Allergies:Patient has no known allergies.    Current Outpatient Medications   Medication Sig Dispense Refill   • valacyclovir (VALTREX) 1 GM Tab Take 1 tablet by mouth 3 times a day for 7 days. 21 tablet 0   • atorvastatin (LIPITOR) 40 MG Tab Take 1 tablet by mouth every day. 30 tablet 2   • sertraline (ZOLOFT) 50 MG Tab Take 1 Tab by mouth every day. 90 Tab 2   • Psyllium (METAMUCIL FIBER PO) Take  by mouth.     • Cholecalciferol (VITAMIN D) 2000 UNIT TABS Take 2,000  Full consult note to follow, but she has iron deficiency anemia due to menorrhagia which is going to be exacerbated by her need for anticoagulation. She's met with gynecology. She's previously been on iron without success and it causes gi discomfort.     Will give one dose of Venofer here and then arrange for two doses of Feraheme in the office. Explained risk of reaction.     OK for discharge from hematology standpoint as long as repeat hemoglobin greater than 7.5.    "Units by mouth every evening.     • therapeutic multivitamin-minerals (THERAGRAN-M) TABS Take 1 Tab by mouth every day.       No current facility-administered medications for this visit.       Social History     Tobacco Use   • Smoking status: Current Every Day Smoker     Years: 40.00     Types: Cigarettes     Last attempt to quit: 2014     Years since quittin.5   • Smokeless tobacco: Never Used   • Tobacco comment: a pack a week    Substance Use Topics   • Alcohol use: Yes     Alcohol/week: 0.6 oz     Types: 1 Cans of beer per week   • Drug use: No     Social History     Social History Narrative   • Not on file       Family History   Problem Relation Age of Onset   • Stroke Mother    • Heart Disease Father        Review of Systems:    Constitutional: No Fevers, Chills  Eyes: No vision changes  ENT: No hearing changes  Resp: No Shortness of breath  CV: No Chest pain  GI: No Nausea/Vomiting  MSK: No weakness  Skin: No rashes  Neuro: No Headaches  Psych: No Suicidal ideations    All remaining systems reviewed and found to be negative, except as stated above.    Exam:    /70   Pulse 81   Temp 36.4 °C (97.6 °F) (Temporal)   Resp 12   Ht 1.6 m (5' 3\")   Wt 71.5 kg (157 lb 9.6 oz)   SpO2 97%  Body mass index is 27.92 kg/m².    General:  Well nourished, well developed male in NAD  HENT: Atraumatic, normocephalic  EYES: Extraocular movements intact  NECK: Supple, FROM  CHEST: No deformities, Equal chest expansion  RESP: Unlabored, no stridor or audible wheeze  HEART: Regular Rate and rhythm.   Extremities: No Clubbing, Cyanosis, or Edema  Skin: Warm/dry, diffuse macular papular, blanching rash over the arms and thorax, one erythematous papular lesion measuring approximately 1 cm over the lateral aspect of the left upper thorax.  No blistering, nontender to palpation.  Blanching.  No other papular lesions present.  Neuro: A/O x 4, due to COVID-19- did not have patient remove face mask to test cranial " nerves.  Motor/sensory grossly intact  Psych: Normal behavior, normal affect      Lab review:  Labs are reviewed and discussed with a patient  Lab Results   Component Value Date/Time    CHOLSTRLTOT 199 04/19/2021 10:35 AM     (H) 04/19/2021 10:35 AM    HDL 35 (A) 04/19/2021 10:35 AM    TRIGLYCERIDE 199 (H) 04/19/2021 10:35 AM       Lab Results   Component Value Date/Time    SODIUM 137 10/06/2020 09:17 AM    POTASSIUM 4.6 10/06/2020 09:17 AM    CHLORIDE 102 10/06/2020 09:17 AM    CO2 23 10/06/2020 09:17 AM    GLUCOSE 103 (H) 10/06/2020 09:17 AM    BUN 18 10/06/2020 09:17 AM    CREATININE 0.99 10/06/2020 09:17 AM    BUNCREATRAT 18 10/27/2017 07:42 AM     Lab Results   Component Value Date/Time    ALKPHOSPHAT 83 04/19/2021 10:35 AM    ASTSGOT 22 04/19/2021 10:35 AM    ALTSGPT 29 04/19/2021 10:35 AM    TBILIRUBIN 0.3 04/19/2021 10:35 AM      Lab Results   Component Value Date/Time    HBA1C 5.8 (H) 04/19/2021 10:35 AM    HBA1C 5.8 (H) 01/11/2021 08:03 AM     Lab Results   Component Value Date/Time    TSH 2.650 02/19/2018 04:13 PM     No results found for: FREET4    Lab Results   Component Value Date/Time    WBC 9.8 10/06/2020 09:17 AM    RBC 5.23 10/06/2020 09:17 AM    HEMOGLOBIN 16.1 10/06/2020 09:17 AM    HEMATOCRIT 48.1 10/06/2020 09:17 AM    MCV 92.0 10/06/2020 09:17 AM    MCH 30.8 10/06/2020 09:17 AM    MCHC 33.5 (L) 10/06/2020 09:17 AM    MPV 10.2 10/06/2020 09:17 AM    NEUTSPOLYS 75.00 (H) 10/06/2020 09:17 AM    LYMPHOCYTES 14.40 (L) 10/06/2020 09:17 AM    MONOCYTES 7.80 10/06/2020 09:17 AM    EOSINOPHILS 1.90 10/06/2020 09:17 AM    BASOPHILS 0.30 10/06/2020 09:17 AM          Assessment/Plan:  1. Rash  - CBC WITH DIFFERENTIAL; Future  - Comp Metabolic Panel; Future  Suspect allergic reaction to Bactrim.  We will continue off the medication for now.  Look for resolution by next week.  Instructed patient to message me as far as progression and resolution of his symptoms.  If his symptoms persist would like to  see him back sooner.    Evaluated patient's papular lesion over the left lateral upper thorax.  It is nontender, blanching.  No evidence of blisters.  Patient is not having any burning pain.  Not highly suspicious of shingles.  In case his symptoms change, he develops a rash in a linear pattern or develops a burning pain, writing him a prescription of Valtrex to have on hand to start immediately.  Hold off on starting antiviral unless his symptoms worsen or change.  Also suspect this could be a bug bite?  2. Pain in right testicle  Patient will see urology next month.  If his symptoms persist would recommend Dr. Brooke evaluate and recommend treatment.  If he develops any testicular pain recommend immediate follow-up with urology.  3. Fever, unspecified fever cause  - CBC WITH DIFFERENTIAL; Future  - Comp Metabolic Panel; Future  Fever is listed as a potential side effect to Bactrim.  Patient has no other systemic symptoms suspicious for another infectious process.  He was having diarrhea and this is now resolved, again diarrhea could be a side effect of the antibiotics.  Getting a CBC and CMP for further analysis of an infectious process.  We will maintain off the antibiotic for now.  Other orders  - valacyclovir (VALTREX) 1 GM Tab; Take 1 tablet by mouth 3 times a day for 7 days.  Dispense: 21 tablet; Refill: 0       Follow-up: Return if symptoms worsen or fail to improve.    Please note that this dictation was created using voice recognition software. I have made every reasonable attempt to correct obvious errors, but I expect that there are errors of grammar and possibly content that I did not discover before finalizing the note.

## 2021-06-02 RX ORDER — ATORVASTATIN CALCIUM 20 MG/1
TABLET, FILM COATED ORAL
Qty: 90 TABLET | Refills: 0 | Status: SHIPPED | OUTPATIENT
Start: 2021-06-02 | End: 2021-08-30

## 2021-06-02 NOTE — TELEPHONE ENCOUNTER
Pt is established with RO[facility] but, she is out for the day. Would you review and approve? Pt saw LQ 04/2021.    Thank you

## 2021-06-09 NOTE — PROGRESS NOTES
"Subjective   Patient ID: Mariaelena Mack is a 46 y.o. female is here today for follow-up after CTA head on 6/11/21 for intracranial aneurysm.  Today pt reports some occasional headaches and a \"hairlike protrusion in her line of vision\"    46-year-old female for follow-up with history of a large left-sided carotid artery aneurysm.  She is status post stent and coil embolization with a Pipeline stent on November 2, 2017.  Aspirin and Brilinta were used for dual antiplatelet therapy during neointimal growth and the Brilinta was discontinued following the follow-up angiogram dated 6/11/2018.  Patient complains of some vision abnormalities, but has no headaches or focal neurologic deficits.  She is on aspirin and Lipitor currently.  She has no definite family history of cerebral aneurysms, but her mother did die suddenly with a heart attack excluded and a ruptured aneurysm is a possibility.  She was a previous smoker, but quit in 2015 and does not have any hypertension.  She has had a previous left MCA stroke with a PFO discovered and repaired.  Patient is here to discuss her latest aneurysm follow-up.      The following portions of the patient's history were reviewed and updated as appropriate: allergies, current medications, past family history, past medical history, past social history, past surgical history and problem list.    Review of Systems   Eyes: Positive for visual disturbance.   Neurological: Positive for headaches (occasionally). Negative for dizziness, seizures, syncope, speech difficulty, light-headedness and numbness.   Psychiatric/Behavioral: Negative for confusion and decreased concentration.       Objective      Vitals:    06/15/21 0924   BP: 110/84   Pulse: 56   Resp: 16   Temp: 98 °F (36.7 °C)     Body mass index is 35.99 kg/m².    Physical Exam  Vitals and nursing note reviewed.   Constitutional:       General: She is not in acute distress.  HENT:      Head: Normocephalic and atraumatic.      " Nose: Nose normal.      Mouth/Throat:      Mouth: Mucous membranes are moist.   Eyes:      Extraocular Movements: Extraocular movements intact.      Conjunctiva/sclera: Conjunctivae normal.      Pupils: Pupils are equal, round, and reactive to light.   Cardiovascular:      Rate and Rhythm: Normal rate.      Pulses: Normal pulses.   Pulmonary:      Effort: Pulmonary effort is normal.   Musculoskeletal:         General: Normal range of motion.      Cervical back: Normal range of motion.   Skin:     General: Skin is warm and dry.   Neurological:      General: No focal deficit present.      Mental Status: She is alert and oriented to person, place, and time.      Cranial Nerves: No cranial nerve deficit.      Sensory: No sensory deficit.      Motor: No weakness.      Coordination: Coordination normal.      Gait: Gait normal.   Psychiatric:         Mood and Affect: Mood normal.         Behavior: Behavior normal.         Thought Content: Thought content normal.         Judgment: Judgment normal.       Neurologic Exam     Mental Status   Oriented to person, place, and time.     Cranial Nerves     CN III, IV, VI   Pupils are equal, round, and reactive to light.      Assessment/Plan   Independent Review of Radiographic Studies:    The CTA of the neck and head with contrast performed on 2021 was reviewed with the patient and shows the large left ophthalmic artery aneurysm now status post stent assisted coil embolization with no suggestion of recurrence but the CT study is polluted with artifacts.  No new aneurysms are appreciated.  Medical Decision Makin-year-old female with history of an unruptured left paraophthalmic artery aneurysm status post stent and coil embolization on 2017 now with follow-up CTA that shows no new intracranial aneurysm or suggest possible recurrence, however this is not the study of choice and an MRA with and without contrast will be obtained at the next follow-up in 3  years.  Diagnoses and all orders for this visit:    1. Intracranial aneurysm (Primary)    2. Hx of cerebral aneurysm repair      Return in about 3 years (around 6/15/2024) for MRA Head, Recheck.  Follow-up with her ophthalmologist for vision changes.

## 2021-06-11 ENCOUNTER — HOSPITAL ENCOUNTER (OUTPATIENT)
Dept: CT IMAGING | Facility: HOSPITAL | Age: 46
Discharge: HOME | End: 2021-06-11
Admitting: RADIOLOGY

## 2021-06-11 DIAGNOSIS — I67.1 INTRACRANIAL ANEURYSM: ICD-10-CM

## 2021-06-11 PROCEDURE — 25010000002 IOPAMIDOL 61 % SOLUTION: Performed by: RADIOLOGY

## 2021-06-11 PROCEDURE — 70496 CT ANGIOGRAPHY HEAD: CPT

## 2021-06-11 RX ADMIN — IOPAMIDOL 95 ML: 612 INJECTION, SOLUTION INTRAVENOUS at 09:46

## 2021-06-15 ENCOUNTER — OFFICE VISIT (OUTPATIENT)
Dept: NEUROSURGERY | Facility: CLINIC | Age: 46
End: 2021-06-15

## 2021-06-15 VITALS
RESPIRATION RATE: 16 BRPM | TEMPERATURE: 98 F | WEIGHT: 223 LBS | HEIGHT: 66 IN | SYSTOLIC BLOOD PRESSURE: 110 MMHG | DIASTOLIC BLOOD PRESSURE: 84 MMHG | HEART RATE: 56 BPM | BODY MASS INDEX: 35.84 KG/M2

## 2021-06-15 DIAGNOSIS — I67.1 INTRACRANIAL ANEURYSM: Primary | ICD-10-CM

## 2021-06-15 DIAGNOSIS — Z98.890 HX OF CEREBRAL ANEURYSM REPAIR: ICD-10-CM

## 2021-06-15 DIAGNOSIS — Z86.79 HX OF CEREBRAL ANEURYSM REPAIR: ICD-10-CM

## 2021-06-15 PROCEDURE — 99203 OFFICE O/P NEW LOW 30 MIN: CPT | Performed by: RADIOLOGY

## 2021-08-30 RX ORDER — ATORVASTATIN CALCIUM 20 MG/1
TABLET, FILM COATED ORAL
Qty: 90 TABLET | Refills: 0 | Status: SHIPPED | OUTPATIENT
Start: 2021-08-30 | End: 2021-12-10

## 2021-08-30 NOTE — TELEPHONE ENCOUNTER
Pt last seen 4/1/2021. No follow up scheduled.       Quantity 90 for 90 days.      Please review and approve or advise.     Thank you.

## 2021-10-01 PROBLEM — T45.4X5S ADVERSE EFFECT OF IRON AND ITS COMPOUNDS, SEQUELA: Status: ACTIVE | Noted: 2017-11-06

## 2021-12-10 NOTE — TELEPHONE ENCOUNTER
Pt last seen 4/1/2021. Return if symptoms worsen or fail to improve.    Quantity 90 for 90 days.      Please review and approve or advise.     Thank you.

## 2021-12-12 RX ORDER — ATORVASTATIN CALCIUM 20 MG/1
TABLET, FILM COATED ORAL
Qty: 90 TABLET | Refills: 0 | Status: SHIPPED | OUTPATIENT
Start: 2021-12-12 | End: 2022-04-12

## 2021-12-21 ENCOUNTER — APPOINTMENT (OUTPATIENT)
Dept: WOMENS IMAGING | Facility: HOSPITAL | Age: 46
End: 2021-12-21

## 2021-12-21 PROCEDURE — 77067 SCR MAMMO BI INCL CAD: CPT | Performed by: RADIOLOGY

## 2021-12-21 PROCEDURE — 77063 BREAST TOMOSYNTHESIS BI: CPT | Performed by: RADIOLOGY

## 2022-04-12 RX ORDER — ATORVASTATIN CALCIUM 20 MG/1
TABLET, FILM COATED ORAL
Qty: 90 TABLET | Refills: 0 | Status: SHIPPED | OUTPATIENT
Start: 2022-04-12 | End: 2022-09-22

## 2022-09-22 RX ORDER — ATORVASTATIN CALCIUM 20 MG/1
TABLET, FILM COATED ORAL
Qty: 90 TABLET | Refills: 0 | Status: SHIPPED | OUTPATIENT
Start: 2022-09-22 | End: 2022-09-27 | Stop reason: SDUPTHER

## 2022-09-27 ENCOUNTER — OFFICE VISIT (OUTPATIENT)
Dept: FAMILY MEDICINE CLINIC | Facility: CLINIC | Age: 47
End: 2022-09-27

## 2022-09-27 VITALS
BODY MASS INDEX: 34.23 KG/M2 | HEIGHT: 66 IN | OXYGEN SATURATION: 98 % | TEMPERATURE: 97.9 F | SYSTOLIC BLOOD PRESSURE: 110 MMHG | HEART RATE: 81 BPM | DIASTOLIC BLOOD PRESSURE: 69 MMHG | WEIGHT: 213 LBS | RESPIRATION RATE: 16 BRPM

## 2022-09-27 DIAGNOSIS — Z98.890 HX OF CEREBRAL ANEURYSM REPAIR: ICD-10-CM

## 2022-09-27 DIAGNOSIS — Z86.79 HX OF CEREBRAL ANEURYSM REPAIR: ICD-10-CM

## 2022-09-27 DIAGNOSIS — R79.89 LFT ELEVATION: ICD-10-CM

## 2022-09-27 DIAGNOSIS — Z12.11 SCREEN FOR COLON CANCER: ICD-10-CM

## 2022-09-27 DIAGNOSIS — E53.8 B12 DEFICIENCY: ICD-10-CM

## 2022-09-27 DIAGNOSIS — I63.512 LEFT MIDDLE CEREBRAL ARTERY STROKE: ICD-10-CM

## 2022-09-27 DIAGNOSIS — E55.9 VITAMIN D DEFICIENCY: ICD-10-CM

## 2022-09-27 DIAGNOSIS — Q21.12 PFO (PATENT FORAMEN OVALE): ICD-10-CM

## 2022-09-27 DIAGNOSIS — E78.2 HYPERLIPIDEMIA, MIXED: Primary | ICD-10-CM

## 2022-09-27 PROCEDURE — 99214 OFFICE O/P EST MOD 30 MIN: CPT | Performed by: PHYSICIAN ASSISTANT

## 2022-09-27 RX ORDER — ESCITALOPRAM OXALATE 10 MG/1
TABLET ORAL
Qty: 30 TABLET | Refills: 1 | Status: SHIPPED | OUTPATIENT
Start: 2022-09-27 | End: 2022-11-15

## 2022-09-27 RX ORDER — ATORVASTATIN CALCIUM 20 MG/1
20 TABLET, FILM COATED ORAL NIGHTLY
Qty: 90 TABLET | Refills: 3 | Status: SHIPPED | OUTPATIENT
Start: 2022-09-27 | End: 2023-03-13 | Stop reason: SDUPTHER

## 2022-09-27 NOTE — PATIENT INSTRUCTIONS
Fat and Cholesterol Restricted Eating Plan  Eating a diet that limits fat and cholesterol may help lower your risk for heart disease and other conditions. Your body needs fat and cholesterol for basic functions, but eating too much of these things can be harmful to your health.  Your health care provider may order lab tests to check your blood fat (lipid) and cholesterol levels. This helps your health care provider understand your risk for certain conditions and whether you need to make diet changes. Work with your health care provider or dietitian to make an eating plan that is right for you.  Your plan includes:  Limit your fat intake to ______% or less of your total calories a day. This is ______g of fat per day.  Limit your saturated fat intake to ______% or less of your total calories a day. This is ______g of saturated fat per day.  Limit the amount of cholesterol in your diet to less than _________mg a day.  Eat ___________ g of fiber a day.  What are tips for following this plan?  General guidelines  If you are overweight, work with your health care provider to lose weight safely. Losing just 5-10% of your body weight can improve your overall health and help prevent diseases such as diabetes and heart disease.  Avoid:  Foods with added sugar.  Fried foods.  Foods that contain partially hydrogenated oils, including stick margarine, some tub margarines, cookies, crackers, and other baked goods.  If you drink alcohol:  Limit how much you have to:  0-1 drink a day for women who are not pregnant.  0-2 drinks a day for men.  Know how much alcohol is in a drink. In the [place] one drink equals one 12 oz bottle of beer (355 mL), one 5 oz glass of wine (148 mL), or one 1½ oz glass of hard liquor (44 mL).  Reading food labels  Check food labels for:  Trans fats or partially hydrogenated oils. Avoid foods that contain these.  High amounts of saturated fat. Choose foods that are low in saturated fat (less than 2 g).  The  "amount of cholesterol in each serving.  The amount of fiber in each serving.  Choose foods with healthy fats, such as:  Monounsaturated and polyunsaturated fats. These include olive and canola oil, flaxseeds, walnuts, almonds, and seeds.  Omega-3 fats. These are found in foods such as salmon, mackerel, sardines, tuna, flaxseed oil, and ground flaxseeds.  Choose grain products that have whole grains. Look for the word \"whole\" as the first word in the ingredient list.  Cooking  Cook foods using methods other than frying. Baking, boiling, grilling, and broiling are some healthy options.  Eat more home-cooked food and less restaurant, buffet, and fast food.  Avoid cooking using saturated fats.  Animal sources of saturated fats include meats, butter, and cream.  Plant sources of saturated fats include palm oil, palm kernel oil, and coconut oil.  Meal planning    At meals, imagine dividing your plate into fourths:  Fill one-half of your plate with vegetables, green salads, and fruit.  Fill one-fourth of your plate with whole grains.  Fill one-fourth of your plate with lean protein foods.  Eat fish that is high in omega-3 fats at least two times a week.  Eat more foods that contain fiber, such as whole grains, beans, apples, pears, berries, broccoli, carrots, peas, and barley. These foods help promote healthy cholesterol levels in the blood.  What foods should I eat?  Fruits  All fresh, canned (in natural juice), or frozen fruits.  Vegetables  Fresh or frozen vegetables (raw, steamed, roasted, or grilled). Green salads.  Grains  Whole grains, such as whole wheat or whole grain breads, crackers, cereals, and pasta. Unsweetened oatmeal, bulgur, barley, quinoa, or brown rice. Corn or whole wheat flour tortillas.  Meats and other proteins  Ground beef (85% or leaner), grass-fed beef, or beef trimmed of fat. Skinless chicken or turkey. Ground chicken or turkey. Pork trimmed of fat. All fish and seafood. Egg whites. Dried beans, " peas, or lentils. Unsalted nuts or seeds. Unsalted canned beans. Natural nut butters without added sugar and oil.  Dairy  Low-fat or nonfat dairy products, such as skim or 1% milk, 2% or reduced-fat cheeses, low-fat and fat-free ricotta or cottage cheese, or plain low-fat and nonfat yogurt.  Fats and oils  Tub margarine without trans fats. Light or reduced-fat mayonnaise and salad dressings. Avocado. Olive, canola, sesame, or safflower oils.  The items listed above may not be a complete list of foods and beverages you can eat. Contact a dietitian for more information.  What foods should I avoid?  Fruits  Canned fruit in heavy syrup. Fruit in cream or butter sauce. Fried fruit.  Vegetables  Vegetables cooked in cheese, cream, or butter sauce. Fried vegetables.  Grains  White bread. White pasta. White rice. Cornbread. Bagels, pastries, and croissants. Crackers and snack foods that contain trans fat and hydrogenated oils.  Meats and other proteins  Fatty cuts of meat. Ribs, chicken wings, klein, sausage, bologna, salami, chitterlings, fatback, hot dogs, bratwurst, and packaged lunch meats. Liver and organ meats. Whole eggs and egg yolks. Chicken and turkey with skin. Fried meat.  Dairy  Whole or 2% milk, cream, half-and-half, and cream cheese. Whole milk cheeses. Whole-fat or sweetened yogurt. Full-fat cheeses. Nondairy creamers and whipped toppings. Processed cheese, cheese spreads, and cheese curds.  Fats and oils  Butter, stick margarine, lard, shortening, ghee, or klein fat. Coconut, palm kernel, and palm oils.  Beverages  Alcohol. Sugar-sweetened drinks such as sodas, lemonade, and fruit drinks.  Sweets and desserts  Corn syrup, sugars, honey, and molasses. Candy. Jam and jelly. Syrup. Sweetened cereals. Cookies, pies, cakes, donuts, muffins, and ice cream.  The items listed above may not be a complete list of foods and beverages you should avoid. Contact a dietitian for more information.  Summary  Your body needs  fat and cholesterol for basic functions. However, eating too much of these things can be harmful to your health.  Work with your health care provider and dietitian to follow a diet that limits fat and cholesterol. Doing this may help lower your risk for heart disease and other conditions.  Choose healthy fats, such as monounsaturated and polyunsaturated fats, and foods high in omega-3 fatty acids.  Eat fiber-rich foods, such as whole grains, beans, peas, fruits, and vegetables.  Limit or avoid alcohol, fried foods, and foods high in saturated fats, partially hydrogenated oils, and sugar.  This information is not intended to replace advice given to you by your health care provider. Make sure you discuss any questions you have with your health care provider.  Document Revised: 04/29/2022 Document Reviewed: 04/29/2022  [employer]vier Patient Education © 2022 [employer]

## 2022-09-27 NOTE — PROGRESS NOTES
"Subjective   Mariaelena Mack is a 47 y.o. female.     History of Present Illness    Since the last visit, she has overall felt anxious.  She has Hyperlipidemia with goals met with current Rx and Vitamin D deficiency and will update labs for continued management.  she has been compliant with current medications have reviewed them.  The patient denies medication side effects.  Will refill medications. /69   Pulse 81   Temp 97.9 °F (36.6 °C) (Skin)   Resp 16   Ht 167.6 cm (66\")   Wt 96.6 kg (213 lb)   SpO2 98%   BMI 34.38 kg/m²     Mariaelena Mack female 47 y.o., /69   Pulse 81   Temp 97.9 °F (36.6 °C) (Skin)   Resp 16   Ht 167.6 cm (66\")   Wt 96.6 kg (213 lb)   SpO2 98%   BMI 34.38 kg/m²   who presents today for follow up of Anxiety.  She reports anxiety, anhedonia, impaired concentration, excessive worry, unable to relax and sleep disturbance. Onset of symptoms was approximately several years ago. She denies current suicidal and homicidal ideation. Risk factors are family history of anxiety and or depression and lifestyle of multiple roles.  Previous treatment includes none. She complains of the following medication side effects:none. The patient declines to go to counseling..  Not depressed    To see Dr Jay----does mammo  Smoked 20 yr--20 pk quit ----2017  She is on B12      My prior notes  Hx STROKE d/t brain aneurysm---to stay on ASA 81mg  Sees neuro-   PFO repair     Sees cardio yearly----follow PFO closure   prior neuro notes 2-13-20  Ms. Mack has a h/o left MCA infarct she suffered in October 2017, etiology likely due to thrombosis of aneurysm that was treated by Dr. Day. However, a cardioembolic/paradoxical source could not be ruled out given the finding of PFO/ASD and she had closure device placement in Nov. 2018.  She has since been maintained on ASA and Lipitor with no recurrent or new stroke/TIA symptoms.  She has essentially returned to her neurologic baseline " he has no residuals.    Last note from neurology was 2021 and noted to follow-up as needed  Hist migraine  GYN ---hyst    Last visit with neurosurgeon was 6/15/2021     Medical Decision Makin-year-old female with history of an unruptured left paraophthalmic artery aneurysm status post stent and coil embolization on 2017 now with follow-up CTA that shows no new intracranial aneurysm or suggest possible recurrence, however this is not the study of choice and an MRA with and without contrast will be obtained at the next follow-up in 3 years.  Diagnoses and all orders for this visit:     The following portions of the patient's history were reviewed and updated as appropriate: allergies, current medications, past family history, past medical history, past social history, past surgical history and problem list.    Review of Systems   Constitutional: Negative for activity change, appetite change and unexpected weight change.   HENT: Negative for nosebleeds and trouble swallowing.    Eyes: Negative for pain and visual disturbance.   Respiratory: Negative for chest tightness, shortness of breath and wheezing.    Cardiovascular: Negative for chest pain and palpitations.   Gastrointestinal: Negative for abdominal pain and blood in stool.   Endocrine: Negative.    Genitourinary: Negative for difficulty urinating and hematuria.   Musculoskeletal: Negative for joint swelling.   Skin: Negative for color change and rash.   Allergic/Immunologic: Negative.    Neurological: Negative for syncope and speech difficulty.   Hematological: Negative for adenopathy.   Psychiatric/Behavioral: Negative for agitation, confusion and dysphoric mood. The patient is nervous/anxious.    All other systems reviewed and are negative.      Objective   Physical Exam  Vitals and nursing note reviewed.   Constitutional:       General: She is not in acute distress.     Appearance: She is well-developed. She is not ill-appearing or  toxic-appearing.   HENT:      Head: Normocephalic.      Right Ear: External ear normal.      Left Ear: External ear normal.      Nose: Nose normal.      Mouth/Throat:      Pharynx: Oropharynx is clear.   Eyes:      General: No scleral icterus.     Conjunctiva/sclera: Conjunctivae normal.      Pupils: Pupils are equal, round, and reactive to light.   Neck:      Thyroid: No thyromegaly.   Cardiovascular:      Rate and Rhythm: Normal rate and regular rhythm.      Heart sounds: Normal heart sounds. No murmur heard.  Pulmonary:      Effort: Pulmonary effort is normal. No respiratory distress.      Breath sounds: Normal breath sounds.   Musculoskeletal:         General: No deformity. Normal range of motion.      Cervical back: Normal range of motion and neck supple.   Skin:     General: Skin is warm and dry.      Findings: No rash.   Neurological:      General: No focal deficit present.      Mental Status: She is alert and oriented to person, place, and time. Mental status is at baseline.   Psychiatric:         Mood and Affect: Mood normal.         Behavior: Behavior normal.         Thought Content: Thought content normal.         Judgment: Judgment normal.         Assessment & Plan   Diagnoses and all orders for this visit:    1. Hyperlipidemia, mixed (Primary)  -     Comprehensive metabolic panel  -     Lipid panel  -     CBC and Differential  -     TSH  -     Vitamin D 25 Hydroxy  -     Vitamin B12  -     Folate  -     T4, Free  -     T3, Free    2. B12 deficiency  -     Comprehensive metabolic panel  -     Lipid panel  -     CBC and Differential  -     TSH  -     Vitamin D 25 Hydroxy  -     Vitamin B12  -     Folate  -     T4, Free  -     T3, Free    3. Vitamin D deficiency  -     Comprehensive metabolic panel  -     Lipid panel  -     CBC and Differential  -     TSH  -     Vitamin D 25 Hydroxy  -     Vitamin B12  -     Folate  -     T4, Free  -     T3, Free    4. Left middle cerebral artery stroke (HCC)  -      Comprehensive metabolic panel  -     Lipid panel  -     CBC and Differential  -     TSH  -     Vitamin D 25 Hydroxy  -     Vitamin B12  -     Folate  -     T4, Free  -     T3, Free    5. Hx of cerebral aneurysm repair  -     Comprehensive metabolic panel  -     Lipid panel  -     CBC and Differential  -     TSH  -     Vitamin D 25 Hydroxy  -     Vitamin B12  -     Folate  -     T4, Free  -     T3, Free    6. PFO (patent foramen ovale)  Comments:  repaired 2017  Orders:  -     Comprehensive metabolic panel  -     Lipid panel  -     CBC and Differential  -     TSH  -     Vitamin D 25 Hydroxy  -     Vitamin B12  -     Folate  -     T4, Free  -     T3, Free    still on B12---get lab  Start Lexapro for anxiety   Need to see neurologist ----d/t see Margareth RUDOLPH  Need f/u from PFO closure 2017----Dr Estrada---was seeing Dr Sukumar Montoya, Mariaelena Mack, was seen today.  she was seen for Hyperlipidemia and will continue current medication and Vitamin D deficiency and will update labs .  Start Lexapro for anxiety--f/u 1 mo     Answers for HPI/ROS submitted by the patient on 9/27/2022  Please describe your symptoms.: Check up for medication  Have you had these symptoms before?: Yes  How long have you been having these symptoms?: Greater than 2 weeks  Please list any medications you are currently taking for this condition.: Atorvastatin  What is the primary reason for your visit?: Other

## 2022-09-28 LAB
25(OH)D3+25(OH)D2 SERPL-MCNC: 47.6 NG/ML (ref 30–100)
ALBUMIN SERPL-MCNC: 4.8 G/DL (ref 3.5–5.2)
ALBUMIN/GLOB SERPL: 2.5 G/DL
ALP SERPL-CCNC: 54 U/L (ref 39–117)
ALT SERPL-CCNC: 35 U/L (ref 1–33)
AST SERPL-CCNC: 35 U/L (ref 1–32)
BASOPHILS # BLD AUTO: 0.03 10*3/MM3 (ref 0–0.2)
BASOPHILS NFR BLD AUTO: 0.5 % (ref 0–1.5)
BILIRUB SERPL-MCNC: 1.1 MG/DL (ref 0–1.2)
BUN SERPL-MCNC: 8 MG/DL (ref 6–20)
BUN/CREAT SERPL: 10.1 (ref 7–25)
CALCIUM SERPL-MCNC: 10.4 MG/DL (ref 8.6–10.5)
CHLORIDE SERPL-SCNC: 99 MMOL/L (ref 98–107)
CHOLEST SERPL-MCNC: 200 MG/DL (ref 0–200)
CO2 SERPL-SCNC: 25.6 MMOL/L (ref 22–29)
CREAT SERPL-MCNC: 0.79 MG/DL (ref 0.57–1)
EGFRCR SERPLBLD CKD-EPI 2021: 93 ML/MIN/1.73
EOSINOPHIL # BLD AUTO: 0.02 10*3/MM3 (ref 0–0.4)
EOSINOPHIL NFR BLD AUTO: 0.3 % (ref 0.3–6.2)
ERYTHROCYTE [DISTWIDTH] IN BLOOD BY AUTOMATED COUNT: 11.9 % (ref 12.3–15.4)
FOLATE SERPL-MCNC: 12.5 NG/ML (ref 4.78–24.2)
GLOBULIN SER CALC-MCNC: 1.9 GM/DL
GLUCOSE SERPL-MCNC: 90 MG/DL (ref 65–99)
HCT VFR BLD AUTO: 44.3 % (ref 34–46.6)
HDLC SERPL-MCNC: 147 MG/DL (ref 40–60)
HGB BLD-MCNC: 15.3 G/DL (ref 12–15.9)
IMM GRANULOCYTES # BLD AUTO: 0.02 10*3/MM3 (ref 0–0.05)
IMM GRANULOCYTES NFR BLD AUTO: 0.3 % (ref 0–0.5)
LDLC SERPL CALC-MCNC: 41 MG/DL (ref 0–100)
LYMPHOCYTES # BLD AUTO: 1.21 10*3/MM3 (ref 0.7–3.1)
LYMPHOCYTES NFR BLD AUTO: 19.5 % (ref 19.6–45.3)
MCH RBC QN AUTO: 34.6 PG (ref 26.6–33)
MCHC RBC AUTO-ENTMCNC: 34.5 G/DL (ref 31.5–35.7)
MCV RBC AUTO: 100.2 FL (ref 79–97)
MONOCYTES # BLD AUTO: 0.4 10*3/MM3 (ref 0.1–0.9)
MONOCYTES NFR BLD AUTO: 6.4 % (ref 5–12)
NEUTROPHILS # BLD AUTO: 4.53 10*3/MM3 (ref 1.7–7)
NEUTROPHILS NFR BLD AUTO: 73 % (ref 42.7–76)
NRBC BLD AUTO-RTO: 0 /100 WBC (ref 0–0.2)
PLATELET # BLD AUTO: 245 10*3/MM3 (ref 140–450)
POTASSIUM SERPL-SCNC: 4.4 MMOL/L (ref 3.5–5.2)
PROT SERPL-MCNC: 6.7 G/DL (ref 6–8.5)
RBC # BLD AUTO: 4.42 10*6/MM3 (ref 3.77–5.28)
SODIUM SERPL-SCNC: 138 MMOL/L (ref 136–145)
T3FREE SERPL-MCNC: 3 PG/ML (ref 2–4.4)
T4 FREE SERPL-MCNC: 1.1 NG/DL (ref 0.93–1.7)
TRIGL SERPL-MCNC: 63 MG/DL (ref 0–150)
TSH SERPL DL<=0.005 MIU/L-ACNC: 2.05 UIU/ML (ref 0.27–4.2)
VIT B12 SERPL-MCNC: 678 PG/ML (ref 211–946)
VLDLC SERPL CALC-MCNC: 12 MG/DL (ref 5–40)
WBC # BLD AUTO: 6.21 10*3/MM3 (ref 3.4–10.8)

## 2022-09-30 LAB
HAV IGM SERPL QL IA: NEGATIVE
HBV CORE IGM SERPL QL IA: NEGATIVE
HBV SURFACE AG SERPL QL IA: NEGATIVE
HCV AB S/CO SERPL IA: <0.1 S/CO RATIO (ref 0–0.9)
HCV AB SERPL QL IA: NORMAL
Lab: NORMAL
WRITTEN AUTHORIZATION: NORMAL

## 2022-10-05 ENCOUNTER — OFFICE VISIT (OUTPATIENT)
Dept: CARDIOLOGY | Facility: CLINIC | Age: 47
End: 2022-10-05

## 2022-10-05 VITALS
HEIGHT: 66 IN | OXYGEN SATURATION: 97 % | HEART RATE: 73 BPM | SYSTOLIC BLOOD PRESSURE: 124 MMHG | BODY MASS INDEX: 34.04 KG/M2 | DIASTOLIC BLOOD PRESSURE: 70 MMHG | WEIGHT: 211.8 LBS

## 2022-10-05 DIAGNOSIS — Z87.74 HX OF PERCUTANEOUS TRANSCATHETER CLOSURE OF CONGENITAL ASD: ICD-10-CM

## 2022-10-05 DIAGNOSIS — Q21.10 ASD (ATRIAL SEPTAL DEFECT): Primary | ICD-10-CM

## 2022-10-05 PROCEDURE — 99214 OFFICE O/P EST MOD 30 MIN: CPT | Performed by: NURSE PRACTITIONER

## 2022-10-05 PROCEDURE — 93000 ELECTROCARDIOGRAM COMPLETE: CPT | Performed by: NURSE PRACTITIONER

## 2022-10-05 NOTE — PROGRESS NOTES
"    CARDIOLOGY        Patient Name: Mariaelena Mack  :1975  Age: 47 y.o.  Primary Cardiologist: Daquan Patino MD  Encounter Provider:  GRIS Ramires    Date of Service: 10/05/22            CHIEF COMPLAINT / REASON FOR OFFICE VISIT     Follow-up      HISTORY OF PRESENT ILLNESS       HPI  Mariaelena Mack is a 47 y.o. female who presents today for semi-annual follow up.     Pt has a  history significant for HLD, PFO, ASD.    Patient reports that she has done well for the past year.  She does exercise in the form of walking on a daily basis.  She is asymptomatic and denies any episodes of chest pain, shortness of breath, palpitations, lightheadedness, swelling or fatigue.  She is tolerating all medications without adverse effects.      The following portions of the patient's history were reviewed and updated as appropriate: allergies, current medications, past family history, past medical history, past social history, past surgical history and problem list.      VITAL SIGNS     Visit Vitals  /70   Pulse 73   Ht 167.6 cm (66\")   Wt 96.1 kg (211 lb 12.8 oz)   SpO2 97%   BMI 34.19 kg/m²         Wt Readings from Last 3 Encounters:   10/05/22 96.1 kg (211 lb 12.8 oz)   22 96.6 kg (213 lb)   06/15/21 101 kg (223 lb)     Body mass index is 34.19 kg/m².      REVIEW OF SYSTEMS   Review of Systems   Constitutional: Negative for chills, fever, weight gain and weight loss.   Cardiovascular: Negative for leg swelling.   Respiratory: Negative for cough, snoring and wheezing.    Hematologic/Lymphatic: Negative for bleeding problem. Does not bruise/bleed easily.   Skin: Negative for color change.   Musculoskeletal: Negative for falls, joint pain and myalgias.   Gastrointestinal: Negative for melena.   Genitourinary: Negative for hematuria.   Neurological: Negative for excessive daytime sleepiness.   Psychiatric/Behavioral: Negative for depression. The patient is not nervous/anxious.  "           PHYSICAL EXAMINATION     Vitals and nursing note reviewed.   Constitutional:       Appearance: Normal appearance. Well-developed.   Eyes:      Conjunctiva/sclera: Conjunctivae normal.   Neck:      Vascular: No carotid bruit.   Pulmonary:      Breath sounds: Normal breath sounds.   Cardiovascular:      Normal rate. Regular rhythm. Normal S1 with normal intensity. Normal S2 with normal intensity.      Murmurs: There is no murmur.      No gallop. No click. No rub.   Musculoskeletal: Normal range of motion. Skin:     General: Skin is warm and dry.   Neurological:      Mental Status: Alert and oriented to person, place, and time.      GCS: GCS eye subscore is 4. GCS verbal subscore is 5. GCS motor subscore is 6.   Psychiatric:         Speech: Speech normal.         Behavior: Behavior normal.         Thought Content: Thought content normal.         Judgment: Judgment normal.           REVIEWED DATA       ECG 12 Lead    Date/Time: 10/5/2022 2:43 PM  Performed by: Elba Castillo APRN  Authorized by: Elba Castillo APRN   Comparison: compared with previous ECG from 9/4/2020  Similar to previous ECG  Rhythm: sinus rhythm  Ectopy: atrial premature contractions  Rate: normal  BPM: 73  Conduction: conduction normal  ST Segments: ST segments normal  T Waves: T waves normal  QRS axis: normal    Clinical impression: non-specific ECG            Cardiac Procedures:  1. ADEEL 10/5/18.  LVEF 61 to 65%.  Secundum ASD with left-to-right flow.  ASD is near the lip of the aortic root and measures up to 0.638 cm.  Agitated saline study shows prominent and negative contrast from the left to right flow as well as small amount of bubbles crossing into the left atrium with inspiration.  Mild tricuspid valve regurgitation.  Calculated RVSP 26 mmHg.  2. ASD closure 11/9/2018.  Successful ASD closure.  3. Echocardiogram 2/11/2019.  ASD closure device and interatrial septum.  LVEF 62%.    Lipid Panel    Lipid Panel 9/27/22   Total  Cholesterol 200   Triglycerides 63   HDL Cholesterol 147 (A)   VLDL Cholesterol 12   LDL Cholesterol  41   (A) Abnormal value       Comments are available for some flowsheets but are not being displayed.           BUN   Date Value Ref Range Status   09/27/2022 8 6 - 20 mg/dL Final   11/02/2020 6 6 - 20 mg/dL Final     Creatinine   Date Value Ref Range Status   09/27/2022 0.79 0.57 - 1.00 mg/dL Final   11/02/2020 0.73 0.57 - 1.00 mg/dL Final     Potassium   Date Value Ref Range Status   09/27/2022 4.4 3.5 - 5.2 mmol/L Final   11/02/2020 4.1 3.5 - 5.2 mmol/L Final     ALT (SGPT)   Date Value Ref Range Status   09/27/2022 35 (H) 1 - 33 U/L Final   09/04/2020 12 1 - 33 U/L Final     AST (SGOT)   Date Value Ref Range Status   09/27/2022 35 (H) 1 - 32 U/L Final   09/04/2020 19 1 - 32 U/L Final           ASSESSMENT & PLAN     Diagnoses and all orders for this visit:    1. ASD (atrial septal defect) (Primary)  2. Hx of percutaneous transcatheter closure of congenital ASD  · Patient will continue aspirin and Lipitor  · Denies lightheadedness, dizziness, syncope  · History of prior stroke  · Symptoms stable          Return in about 1 year (around 10/5/2023) for Dr. Rashaun Walls- transitioning from Dr. Patino.    Future Appointments       Provider Department Center    10/12/2023 2:20 PM Josse Estrada MD [facility] CARDIOLOGY [facility]                MEDICATIONS         Discharge Medications          Accurate as of October 5, 2022  5:24 PM. If you have any questions, ask your nurse or doctor.            Continue These Medications      Instructions Start Date   aspirin 81 MG EC tablet   81 mg, Oral, Daily, STOP FOR SURGERY      atorvastatin 20 MG tablet  Commonly known as: LIPITOR   20 mg, Oral, Nightly, For cholesterol      cetirizine 10 MG tablet  Commonly known as: zyrTEC   10 mg, Oral, Daily PRN      escitalopram 10 MG tablet  Commonly known as: Lexapro   Start with 1/2 tab PO daily for 4 days then  one po daily for stress      ibuprofen 200 MG tablet  Commonly known as: ADVIL,MOTRIN   200 mg, Oral, As Needed, HOLD PRIOR TO SURGERY      vitamin B-12 1000 MCG tablet  Commonly known as: CYANOCOBALAMIN   1,000 mcg, Oral, Daily, HOLDING FOR SURGERY      VITAMIN D PO   Oral, Every other day                  **Dragon Disclaimer:   Much of this encounter note is an electronic transcription/translation of spoken language to printed text. The electronic translation of spoken language may permit erroneous, or at times, nonsensical words or phrases to be inadvertently transcribed. Although I have reviewed the note for such errors, some may still exist.

## 2022-11-15 RX ORDER — ESCITALOPRAM OXALATE 10 MG/1
TABLET ORAL
Qty: 30 TABLET | Refills: 1 | Status: SHIPPED | OUTPATIENT
Start: 2022-11-15 | End: 2023-02-01

## 2023-02-01 RX ORDER — ATORVASTATIN CALCIUM 20 MG/1
TABLET, FILM COATED ORAL
Qty: 90 TABLET | Refills: 0 | OUTPATIENT
Start: 2023-02-01

## 2023-02-01 RX ORDER — ESCITALOPRAM OXALATE 10 MG/1
TABLET ORAL
Qty: 30 TABLET | Refills: 0 | Status: SHIPPED | OUTPATIENT
Start: 2023-02-01 | End: 2023-02-16 | Stop reason: SDUPTHER

## 2023-02-06 ENCOUNTER — TELEPHONE (OUTPATIENT)
Dept: FAMILY MEDICINE CLINIC | Facility: CLINIC | Age: 48
End: 2023-02-06
Payer: COMMERCIAL

## 2023-02-06 RX ORDER — ESCITALOPRAM OXALATE 10 MG/1
TABLET ORAL
Qty: 90 TABLET | Refills: 0 | OUTPATIENT
Start: 2023-02-06

## 2023-02-06 NOTE — TELEPHONE ENCOUNTER
Pharmacy Name: [facility] - [place], [place] - [address] - [phone] [facility] [phone]     Pharmacy representative name: VADIM    Pharmacy representative phone number: [phone]    What medication are you calling in regards to: escitalopram (LEXAPRO) 10 MG tablet    What question does the pharmacy have: PRESCRIPTION INSTRUCTIONS    Who is the provider that prescribed the medication: MARILUZ NATHONY    Additional notes: THIS IS A REFILL OF PRESCRIPTION AND THEY WANT TO KNOW IF IT IS OKAY TO REMOVE TITRATION INSTRUCTIONS AND JUST STATE ONE TABLET DAILY FOR STRESS. PRESCRIPTION WILL BE ON HOLD UNTIL THEY HEAR FROM THE OFFICE.    PLEASE ADVISE      REFERENCE NUMBER: [id number]

## 2023-02-08 NOTE — TELEPHONE ENCOUNTER
Called pharmacy to inform ok to refill RX without titration.  They are filling 30 day supply.  Pt has appt scheduled on Monday 2/13/2023.  Spoke with patient to inform

## 2023-02-16 ENCOUNTER — OFFICE VISIT (OUTPATIENT)
Dept: FAMILY MEDICINE CLINIC | Facility: CLINIC | Age: 48
End: 2023-02-16
Payer: COMMERCIAL

## 2023-02-16 VITALS
RESPIRATION RATE: 16 BRPM | HEIGHT: 66 IN | BODY MASS INDEX: 34.87 KG/M2 | TEMPERATURE: 97.8 F | OXYGEN SATURATION: 97 % | HEART RATE: 113 BPM | SYSTOLIC BLOOD PRESSURE: 122 MMHG | DIASTOLIC BLOOD PRESSURE: 56 MMHG | WEIGHT: 217 LBS

## 2023-02-16 DIAGNOSIS — Z98.890 HX OF CEREBRAL ANEURYSM REPAIR: ICD-10-CM

## 2023-02-16 DIAGNOSIS — Q21.12 PFO (PATENT FORAMEN OVALE): ICD-10-CM

## 2023-02-16 DIAGNOSIS — E53.8 LOW SERUM VITAMIN B12: ICD-10-CM

## 2023-02-16 DIAGNOSIS — Z86.79 HX OF CEREBRAL ANEURYSM REPAIR: ICD-10-CM

## 2023-02-16 DIAGNOSIS — E55.9 VITAMIN D DEFICIENCY: ICD-10-CM

## 2023-02-16 DIAGNOSIS — F41.1 GENERALIZED ANXIETY DISORDER: Primary | ICD-10-CM

## 2023-02-16 DIAGNOSIS — E53.8 B12 DEFICIENCY: ICD-10-CM

## 2023-02-16 PROCEDURE — 99213 OFFICE O/P EST LOW 20 MIN: CPT | Performed by: PHYSICIAN ASSISTANT

## 2023-02-16 RX ORDER — ESCITALOPRAM OXALATE 10 MG/1
TABLET ORAL
Qty: 90 TABLET | Refills: 3 | Status: SHIPPED | OUTPATIENT
Start: 2023-02-16

## 2023-02-16 NOTE — PROGRESS NOTES
"Subjective   Mariaelena Mack is a 48 y.o. female.     History of Present Illness     Mariaelena Mack female 48 y.o., /56   Pulse 113   Temp 97.8 °F (36.6 °C)   Resp 16   Ht 167.6 cm (66\")   Wt 98.4 kg (217 lb)   SpO2 97%   BMI 35.02 kg/m²   who presents today for follow up of Anxiety.  She reports medication is working well, patient desires to continue on Rx, and needs refill. Onset of symptoms was approximately several months ago. She denies current suicidal and homicidal ideation. Risk factors are lifestyle of multiple roles.  Previous treatment includes current Rx. She complains of the following medication side effects:none if stay at 5mg; felt flat at 10mg---will keep Rx ----if need go up.. The patient declines to go to counseling..    Off iron---hyst    Sees cardio yearly----follow PFO closure--10-5-22 last visit and no change.  My prior notes  Hx STROKE d/t brain aneurysm---to stay on ASA 81mg  Sees neuro-   PFO repair     To see Dr Jay----does mammo  Smoked 20 yr--20 pk quit ----  She is on B12  Ms. Mack has a h/o left MCA infarct she suffered in 2017, etiology likely due to thrombosis of aneurysm that was treated by Dr. Day. However, a cardioembolic/paradoxical source could not be ruled out given the finding of PFO/ASD and she had closure device placement in 2018.  She has since been maintained on ASA and Lipitor with no recurrent or new stroke/TIA symptoms.  She has essentially returned to her neurologic baseline he has no residuals.    Last note from neurology was 2021 and noted to follow-up as needed  Hist migraine  GYN ---hyst     Last visit with neurosurgeon was 6/15/2021     Medical Decision Makin-year-old female with history of an unruptured left paraophthalmic artery aneurysm status post stent and coil embolization on 2017 now with follow-up CTA that shows no new intracranial aneurysm or suggest possible recurrence, however " this is not the study of choice and an MRA with and without contrast will be obtained at the next follow-up in 3 years.  Diagnoses and all orders for this visit:     The following portions of the patient's history were reviewed and updated as appropriate: allergies, current medications, past family history, past medical history, past social history, past surgical history and problem list.    Review of Systems   Constitutional: Negative for activity change, appetite change and unexpected weight change.   HENT: Negative for nosebleeds and trouble swallowing.    Eyes: Negative for pain and visual disturbance.   Respiratory: Negative for chest tightness, shortness of breath and wheezing.    Cardiovascular: Negative for chest pain and palpitations.   Gastrointestinal: Negative for abdominal pain and blood in stool.   Endocrine: Negative.    Genitourinary: Negative for difficulty urinating and hematuria.   Musculoskeletal: Negative for joint swelling.   Skin: Negative for color change and rash.   Allergic/Immunologic: Negative.    Neurological: Negative for syncope and speech difficulty.   Hematological: Negative for adenopathy.   Psychiatric/Behavioral: Negative for agitation and confusion.   All other systems reviewed and are negative.      Objective   Physical Exam  Vitals and nursing note reviewed.   Constitutional:       General: She is not in acute distress.     Appearance: She is well-developed. She is not ill-appearing or toxic-appearing.   HENT:      Head: Normocephalic.      Right Ear: External ear normal.      Left Ear: External ear normal.      Nose: Nose normal.      Mouth/Throat:      Pharynx: Oropharynx is clear.   Eyes:      General: No scleral icterus.     Conjunctiva/sclera: Conjunctivae normal.      Pupils: Pupils are equal, round, and reactive to light.   Neck:      Thyroid: No thyromegaly.   Cardiovascular:      Rate and Rhythm: Normal rate and regular rhythm.      Heart sounds: Normal heart sounds.  No murmur heard.  Pulmonary:      Effort: Pulmonary effort is normal. No respiratory distress.      Breath sounds: Normal breath sounds.   Musculoskeletal:         General: No deformity. Normal range of motion.      Cervical back: Normal range of motion and neck supple.   Skin:     General: Skin is warm and dry.      Findings: No rash.   Neurological:      General: No focal deficit present.      Mental Status: She is alert and oriented to person, place, and time. Mental status is at baseline.   Psychiatric:         Mood and Affect: Mood normal.         Behavior: Behavior normal.         Thought Content: Thought content normal.         Judgment: Judgment normal.         Assessment & Plan   Diagnoses and all orders for this visit:    1. Generalized anxiety disorder (Primary)    2. B12 deficiency    3. PFO (patent foramen ovale)  Comments:  closed    4. Hx of cerebral aneurysm repair    5. Low serum vitamin B12    6. Vitamin D deficiency    Other orders  -     escitalopram (LEXAPRO) 10 MG tablet; 1 PO daily for anxiety  Dispense: 90 tablet; Refill: 3        B12---taking and Vit D  Send in Cologuard  Hx stroke----had PFO closure  Cont Lexapro for anxiety-----started last visit and helping  Plan, Mariaelena Mack, was seen today.  she was seen for Vitamin D deficiency and will update labs .  Cont statin for hx CVA  Sees cardio yearly f/u Closed PFO  Labs Sept  Declines Tdap     Answers for HPI/ROS submitted by the patient on 2/10/2023  Please describe your symptoms.: No symptoms  Have you had these symptoms before?: Yes  How long have you been having these symptoms?: Greater than 2 weeks  Please list any medications you are currently taking for this condition.: Escitalopram  What is the primary reason for your visit?: Other

## 2023-03-10 RX ORDER — ATORVASTATIN CALCIUM 20 MG/1
20 TABLET, FILM COATED ORAL NIGHTLY
Qty: 90 TABLET | Refills: 3 | OUTPATIENT
Start: 2023-03-10

## 2023-03-10 NOTE — TELEPHONE ENCOUNTER
"Caller: Mariaelena Mack \"Svetlana\"    Relationship: Self    Best call back number: [phone]     Requested Prescriptions:   Requested Prescriptions     Pending Prescriptions Disp Refills   • atorvastatin (LIPITOR) 20 MG tablet 90 tablet 3     Sig: Take 1 tablet by mouth Every Night. For cholesterol        Pharmacy where request should be sent: [facility] [phone] [facility] [address] [phone] [phone]      Additional details provided by patient: PATIENT IS OUT OF MEDICATION SHE IS NEEDING A PRESCRIPTION SENT TO THE [facility] AS WELL     Does the patient have less than a 3 day supply:  [x] Yes  [] No    Would you like a call back once the refill request has been completed: [x] Yes [] No    If the office needs to give you a call back, can they leave a voicemail: [x] Yes [] No    Pal Garrett Rep   03/10/23 14:45 EST           "

## 2023-03-13 RX ORDER — ATORVASTATIN CALCIUM 20 MG/1
20 TABLET, FILM COATED ORAL NIGHTLY
Qty: 90 TABLET | Refills: 0 | Status: SHIPPED | OUTPATIENT
Start: 2023-03-13 | End: 2023-03-14 | Stop reason: SDUPTHER

## 2023-03-13 NOTE — TELEPHONE ENCOUNTER
"    Hub staff attempted to follow warm transfer process and was unsuccessful     Caller: Mariaelena Mack \"Svetlana\"    Relationship to patient: Self    Best call back number: [phone]    Patient is needing: PATIENT [facility] Friday [facility] A ONE MONTH SUPPLY TO [address]. HAS NOT HEARD ANYTHING ABOUT THE PRESCRIPTION BEING CALLED IN.  ATORVASTIN     PATIENT STATED SHE IS OUT AND HAS BEEN FOR 5 DAYS       PLEASE ADVISE           "

## 2023-03-14 RX ORDER — ATORVASTATIN CALCIUM 20 MG/1
20 TABLET, FILM COATED ORAL NIGHTLY
Qty: 90 TABLET | Refills: 1 | Status: SHIPPED | OUTPATIENT
Start: 2023-03-14

## 2023-03-14 RX ORDER — ATORVASTATIN CALCIUM 20 MG/1
20 TABLET, FILM COATED ORAL NIGHTLY
Qty: 30 TABLET | Refills: 0 | Status: SHIPPED | OUTPATIENT
Start: 2023-03-14 | End: 2023-03-14 | Stop reason: SDUPTHER

## 2023-08-28 ENCOUNTER — TELEPHONE (OUTPATIENT)
Dept: FAMILY MEDICINE CLINIC | Facility: CLINIC | Age: 48
End: 2023-08-28
Payer: COMMERCIAL

## 2023-08-28 NOTE — TELEPHONE ENCOUNTER
This pt called regarding her son Carlos Mack, wanting to know if you would take her son as pt. Please give pt a call back regarding this msg.This pt does not use  [facility]

## 2023-10-10 ENCOUNTER — OFFICE VISIT (OUTPATIENT)
Dept: CARDIOLOGY | Facility: CLINIC | Age: 48
End: 2023-10-10
Payer: COMMERCIAL

## 2023-10-10 VITALS
HEART RATE: 86 BPM | SYSTOLIC BLOOD PRESSURE: 124 MMHG | DIASTOLIC BLOOD PRESSURE: 88 MMHG | WEIGHT: 224 LBS | HEIGHT: 66 IN | BODY MASS INDEX: 36 KG/M2

## 2023-10-10 DIAGNOSIS — Q21.12 PFO (PATENT FORAMEN OVALE): Primary | ICD-10-CM

## 2023-10-10 PROCEDURE — 93000 ELECTROCARDIOGRAM COMPLETE: CPT | Performed by: INTERNAL MEDICINE

## 2023-10-10 PROCEDURE — 99213 OFFICE O/P EST LOW 20 MIN: CPT | Performed by: INTERNAL MEDICINE

## 2023-10-10 NOTE — PROGRESS NOTES
"      CARDIOLOGY    Josse Estrada MD    ENCOUNTER DATE:  10/10/2023    Mariaelena Mack / 48 y.o. / female        CHIEF COMPLAINT / REASON FOR OFFICE VISIT     ASD (atrial septal defect) (10/05/2022 Follow up)      HISTORY OF PRESENT ILLNESS       HPI  Mariaelena Mack is a 48 y.o. female who presents today for reevaluation.  Patient had a PFO/ASD closure by Dr. Greene many years ago.  Patient had a follow-up echo that showed complete closure with no residual.  Clinically she has been doing great no chest pain shortness of breath palpitations lightheadedness swelling or fatigue.      The following portions of the patient's history were reviewed and updated as appropriate: allergies, current medications, past family history, past medical history, past social history, past surgical history and problem list.      VITAL SIGNS     Visit Vitals  /88 (BP Location: Left arm)   Pulse 86   Ht 167.6 cm (66\")   Wt 102 kg (224 lb)   BMI 36.15 kg/mý         Wt Readings from Last 3 Encounters:   10/10/23 102 kg (224 lb)   02/16/23 98.4 kg (217 lb)   10/05/22 96.1 kg (211 lb 12.8 oz)     Body mass index is 36.15 kg/mý.      REVIEW OF SYSTEMS   ROS        PHYSICAL EXAMINATION     Vitals reviewed.   Constitutional:       Appearance: Healthy appearance.   Pulmonary:      Effort: Pulmonary effort is normal.   Cardiovascular:      Normal rate. Regular rhythm. Normal S1. Normal S2.       Murmurs: There is no murmur.      No gallop.  No click. No rub.   Pulses:     Intact distal pulses.   Edema:     Peripheral edema absent.   Neurological:      Mental Status: Alert and oriented to person, place and time.           REVIEWED DATA       ECG 12 Lead    Date/Time: 10/10/2023 1:32 PM  Performed by: Josse Estrada MD    Authorized by: Josse Estrada MD  Comparison: compared with previous ECG from 10/5/2022  Similar to previous ECG  Rhythm: sinus rhythm  Other findings: non-specific ST-T wave changes    Clinical " impression: non-specific ECG          Cardiac Procedures:            ASSESSMENT & PLAN      Diagnosis Plan   1. PFO (patent foramen ovale)              SUMMARY/DISCUSSION  Patient with a closure device doing well.  She did actually have migraines previous to having her closure device placed many years ago.  She has been stable no issues.  I told her she does not really need to routinely follow-up if she wants to keep a relationship with her office she can come back in about 3 years.  Course if she has any issues she was told to contact our office.        MEDICATIONS         Discharge Medications            Accurate as of October 10, 2023  1:31 PM. If you have any questions, ask your nurse or doctor.                Continue These Medications        Instructions Start Date   aspirin 81 MG EC tablet   81 mg, Oral, Daily, STOP FOR SURGERY      atorvastatin 20 MG tablet  Commonly known as: LIPITOR   20 mg, Oral, Nightly, For cholesterol      cetirizine 10 MG tablet  Commonly known as: zyrTEC   10 mg, Oral, Daily PRN      escitalopram 10 MG tablet  Commonly known as: LEXAPRO   1 PO daily for anxiety      ibuprofen 200 MG tablet  Commonly known as: ADVIL,MOTRIN   200 mg, Oral, As Needed, HOLD PRIOR TO SURGERY      vitamin B-12 1000 MCG tablet  Commonly known as: CYANOCOBALAMIN   1,000 mcg, Oral, Daily, HOLDING FOR SURGERY      VITAMIN D PO   Oral, Every other day                    **Dragon Disclaimer:   Much of this encounter note is an electronic transcription/translation of spoken language to printed text. The electronic translation of spoken language may permit erroneous, or at times, nonsensical words or phrases to be inadvertently transcribed. Although I have reviewed the note for such errors, some may still exist.

## 2024-01-03 RX ORDER — ESCITALOPRAM OXALATE 10 MG/1
TABLET ORAL
Qty: 90 TABLET | Refills: 0 | Status: SHIPPED | OUTPATIENT
Start: 2024-01-03

## 2024-01-03 RX ORDER — ATORVASTATIN CALCIUM 20 MG/1
20 TABLET, FILM COATED ORAL NIGHTLY
Qty: 90 TABLET | Refills: 0 | Status: SHIPPED | OUTPATIENT
Start: 2024-01-03

## 2024-01-03 NOTE — TELEPHONE ENCOUNTER
"Caller: NadegedanielaMariaelena celis \"Svetlana\"    Relationship: Self    Best call back number: [phone]    Requested Prescriptions:   Requested Prescriptions     Pending Prescriptions Disp Refills    escitalopram (LEXAPRO) 10 MG tablet 90 tablet 3     Si PO daily for anxiety    atorvastatin (LIPITOR) 20 MG tablet 90 tablet 1     Sig: Take 1 tablet by mouth Every Night. For cholesterol      Pharmacy where request should be sent: [facility], [address] [phone] [place] [phone]      Last office visit with prescribing clinician: 2023   Last telemedicine visit with prescribing clinician: Visit date not found   Next office visit with prescribing clinician: Visit date not found     Additional details provided by patient: PT IS OUT OF MEDICATION.     Does the patient have less than a 3 day supply:  [x] Yes  [] No    Would you like a call back once the refill request has been completed: [] Yes [x] No    If the office needs to give you a call back, can they leave a voicemail: [] Yes [] No    Pal Vance Rep   24 15:45 EST       "

## 2024-01-24 ENCOUNTER — OFFICE VISIT (OUTPATIENT)
Dept: FAMILY MEDICINE CLINIC | Facility: CLINIC | Age: 49
End: 2024-01-24
Payer: COMMERCIAL

## 2024-01-24 VITALS
SYSTOLIC BLOOD PRESSURE: 104 MMHG | BODY MASS INDEX: 37.12 KG/M2 | HEIGHT: 66 IN | RESPIRATION RATE: 16 BRPM | OXYGEN SATURATION: 95 % | TEMPERATURE: 97 F | HEART RATE: 79 BPM | WEIGHT: 231 LBS | DIASTOLIC BLOOD PRESSURE: 66 MMHG

## 2024-01-24 DIAGNOSIS — Z87.74 HX OF PERCUTANEOUS TRANSCATHETER CLOSURE OF CONGENITAL ASD: ICD-10-CM

## 2024-01-24 DIAGNOSIS — E53.8 LOW SERUM VITAMIN B12: ICD-10-CM

## 2024-01-24 DIAGNOSIS — E78.2 HYPERLIPIDEMIA, MIXED: Primary | ICD-10-CM

## 2024-01-24 DIAGNOSIS — Z12.11 ENCOUNTER FOR SCREENING COLONOSCOPY: ICD-10-CM

## 2024-01-24 DIAGNOSIS — E55.9 VITAMIN D DEFICIENCY: ICD-10-CM

## 2024-01-24 DIAGNOSIS — Z86.79 HX OF CEREBRAL ANEURYSM REPAIR: ICD-10-CM

## 2024-01-24 DIAGNOSIS — I67.9 CEREBROVASCULAR DISEASE: ICD-10-CM

## 2024-01-24 DIAGNOSIS — Z98.890 HX OF CEREBRAL ANEURYSM REPAIR: ICD-10-CM

## 2024-01-24 DIAGNOSIS — F41.1 GENERALIZED ANXIETY DISORDER: ICD-10-CM

## 2024-01-24 RX ORDER — ESCITALOPRAM OXALATE 20 MG/1
20 TABLET ORAL DAILY
Qty: 90 TABLET | Refills: 3 | Status: SHIPPED | OUTPATIENT
Start: 2024-01-24

## 2024-01-24 RX ORDER — ATORVASTATIN CALCIUM 20 MG/1
20 TABLET, FILM COATED ORAL NIGHTLY
Qty: 90 TABLET | Refills: 3 | Status: SHIPPED | OUTPATIENT
Start: 2024-01-24

## 2024-01-24 NOTE — PROGRESS NOTES
"Subjective   Mariaelena Mack is a 49 y.o. female.     Hyperlipidemia       Mariaelena Mack female 49 y.o., /66   Pulse 79   Temp 97 °F (36.1 °C)   Resp 16   Ht 167.6 cm (66\")   Wt 105 kg (231 lb)   SpO2 95%   BMI 37.28 kg/m²   who presents today for follow up of Anxiety.  She reports overall Lexapro is working well for her anxiety she is having a lot of situational stress and has been taking 20 mg some days and it has helped.  We discussed going up on 20 mg dose for now and after few months she can lower the dose back down to 10 mg daily.  It is still helping. Onset of symptoms was approximately several years ago. She denies current suicidal and homicidal ideation. Risk factors are family history of anxiety and or depression and lifestyle of multiple roles.  Previous treatment includes current Rx. She complains of the following medication side effects:sweating. The patient declines to go to counseling..      Off iron---hyst     Sees cardio yearly----follow PFO closure--10-20-23 last visit and no change.---f/u cardio 3 yrs----  My prior notes  Hx STROKE d/t brain aneurysm---to stay on ASA 81mg  Sees neuro-   PFO repair     To see Dr Jay----does mammo  Smoked 20 yr--20 pk quit ----  She is on B12  Ms. Mack has a h/o left MCA infarct she suffered in 2017, etiology likely due to thrombosis of aneurysm that was treated by Dr. Day. However, a cardioembolic/paradoxical source could not be ruled out given the finding of PFO/ASD and she had closure device placement in 2018.  She has since been maintained on ASA and Lipitor with no recurrent or new stroke/TIA symptoms.  She has essentially returned to her neurologic baseline he has no residuals.    Last note from neurology was 2021 and noted to follow-up as needed  Hist migraine  GYN ---hyst     Last visit with neurosurgeon was 6/15/2021     Medical Decision Makin-year-old female with history of an " unruptured left paraophthalmic artery aneurysm status post stent and coil embolization on 11/2/2017 now with follow-up CTA that shows no new intracranial aneurysm or suggest possible recurrence, however this is not the study of choice and an MRA with and without contrast will be obtained at the next follow-up in 3 years.  Diagnoses and all orders for this visit:    No recent migraine  30 pk yr hx ----2017    The following portions of the patient's history were reviewed and updated as appropriate: allergies, current medications, past family history, past medical history, past social history, past surgical history, and problem list.    Review of Systems   Constitutional:  Negative for diaphoresis.   HENT:  Negative for nosebleeds and trouble swallowing.    Eyes:  Negative for blurred vision and visual disturbance.   Respiratory:  Negative for choking.    Gastrointestinal:  Negative for blood in stool.   Allergic/Immunologic: Negative for immunocompromised state.   Neurological:  Negative for facial asymmetry and speech difficulty.   Psychiatric/Behavioral:  Negative for self-injury and suicidal ideas. The patient is nervous/anxious.        Objective   Physical Exam  Vitals and nursing note reviewed.   Constitutional:       General: She is not in acute distress.     Appearance: She is well-developed. She is not ill-appearing or toxic-appearing.   HENT:      Head: Normocephalic.      Right Ear: External ear normal.      Left Ear: External ear normal.      Nose: Nose normal.      Mouth/Throat:      Pharynx: Oropharynx is clear.   Eyes:      General: No scleral icterus.     Conjunctiva/sclera: Conjunctivae normal.      Pupils: Pupils are equal, round, and reactive to light.   Neck:      Thyroid: No thyromegaly.   Cardiovascular:      Rate and Rhythm: Normal rate and regular rhythm.      Heart sounds: Normal heart sounds. No murmur heard.  Pulmonary:      Effort: Pulmonary effort is normal. No respiratory distress.       Breath sounds: Normal breath sounds. No rales.   Musculoskeletal:         General: No deformity. Normal range of motion.      Cervical back: Normal range of motion and neck supple.   Skin:     General: Skin is warm and dry.      Findings: No rash.   Neurological:      General: No focal deficit present.      Mental Status: She is alert and oriented to person, place, and time. Mental status is at baseline.   Psychiatric:         Mood and Affect: Mood normal.         Behavior: Behavior normal.         Thought Content: Thought content normal.         Judgment: Judgment normal.           Assessment & Plan   Diagnoses and all orders for this visit:    1. Hyperlipidemia, mixed (Primary)  -     Comprehensive metabolic panel  -     Lipid panel  -     CBC and Differential  -     TSH  -     Hemoglobin A1c  -     T4, Free  -     Vitamin B12  -     Folate  -     Vitamin D,25-Hydroxy  -     Urinalysis With Microscopic - Urine, Clean Catch    2. Generalized anxiety disorder  -     Comprehensive metabolic panel  -     Lipid panel  -     CBC and Differential  -     TSH  -     Hemoglobin A1c  -     T4, Free  -     Vitamin B12  -     Folate  -     Vitamin D,25-Hydroxy  -     Urinalysis With Microscopic - Urine, Clean Catch    3. Low serum vitamin B12  -     Comprehensive metabolic panel  -     Lipid panel  -     CBC and Differential  -     TSH  -     Hemoglobin A1c  -     T4, Free  -     Vitamin B12  -     Folate  -     Vitamin D,25-Hydroxy  -     Urinalysis With Microscopic - Urine, Clean Catch    4. Vitamin D deficiency  -     Comprehensive metabolic panel  -     Lipid panel  -     CBC and Differential  -     TSH  -     Hemoglobin A1c  -     T4, Free  -     Vitamin B12  -     Folate  -     Vitamin D,25-Hydroxy  -     Urinalysis With Microscopic - Urine, Clean Catch    5. Cerebrovascular disease  -     Comprehensive metabolic panel  -     Lipid panel  -     CBC and Differential  -     TSH  -     Hemoglobin A1c  -     T4, Free  -      Vitamin B12  -     Folate  -     Vitamin D,25-Hydroxy  -     Urinalysis With Microscopic - Urine, Clean Catch    6. Hx of cerebral aneurysm repair  -     Comprehensive metabolic panel  -     Lipid panel  -     CBC and Differential  -     TSH  -     Hemoglobin A1c  -     T4, Free  -     Vitamin B12  -     Folate  -     Vitamin D,25-Hydroxy  -     Urinalysis With Microscopic - Urine, Clean Catch    7. Hx of percutaneous transcatheter closure of congenital ASD  -     Comprehensive metabolic panel  -     Lipid panel  -     CBC and Differential  -     TSH  -     Hemoglobin A1c  -     T4, Free  -     Vitamin B12  -     Folate  -     Vitamin D,25-Hydroxy  -     Urinalysis With Microscopic - Urine, Clean Catch    Watching LFT  Due to see Dr Crow Luisa and needs MRA head =--hx Cerebral aneurysm repair and intracranial aneurysm   She is to call for June appt DR Crow-------needs MRA  Plan, Mariaelena Mack, was seen today.  she was seen for Hyperlipidemia and will continue current medication and Vitamin D deficiency and will update labs .  S/p PFO/ASD closure with history of stroke follow-up with cardiology October 2026 Dr. Estrada  B12 and D 3 times a week   Start low CT chest yearly screen age 50 and do yearly--30 pk yr hx--quit 2017  GYN ---does mammo  Recent situational stress and more anxiety will increase dose Lexapro to 20 mg daily and is helping  Reorder Cologuard to screen colon cancer  More exercise

## 2024-04-17 ENCOUNTER — TELEPHONE (OUTPATIENT)
Dept: NEUROSURGERY | Facility: CLINIC | Age: 49
End: 2024-04-17
Payer: COMMERCIAL

## 2024-04-17 NOTE — TELEPHONE ENCOUNTER
3 year recall letter sent for MRA brain with and without and f/up with Dr. Crow for aneurysm.   Principal Discharge DX:	Biliary obstruction  Secondary Diagnosis:	Jaundice

## 2024-11-18 RX ORDER — ESCITALOPRAM OXALATE 20 MG/1
TABLET ORAL
Qty: 90 TABLET | Refills: 0 | Status: SHIPPED | OUTPATIENT
Start: 2024-11-18

## 2025-02-25 RX ORDER — ESCITALOPRAM OXALATE 20 MG/1
TABLET ORAL
Qty: 90 TABLET | Refills: 0 | OUTPATIENT
Start: 2025-02-25

## (undated) DEVICE — PK LAVH TOWER 40

## (undated) DEVICE — SHLD ANGIO 2LAYR CIR FEN

## (undated) DEVICE — CVR PROB 96IN LF STRL

## (undated) DEVICE — GLV SURG SENSICARE ALOE LF PF SZ7.5 GRN

## (undated) DEVICE — CANSTR SPECI FLUID COL BEMIS

## (undated) DEVICE — ENDOPATH XCEL BLUNT TIP TROCARS WITH SMOOTH SLEEVES: Brand: ENDOPATH XCEL

## (undated) DEVICE — GLV SURG BIOGEL LTX PF 7 1/2

## (undated) DEVICE — STANDARD GUIDEWIRE WITH HYDROPHILIC COATING: Brand: SYNCHRO 2

## (undated) DEVICE — APPL CHLORAPREP W/TINT 26ML ORNG

## (undated) DEVICE — EXTENSION SET, MALE LUER LOCK ADAPTER WITH RETRACTABLE COLLAR

## (undated) DEVICE — DEV ANGIO FLOSWITCH HP BX24

## (undated) DEVICE — SPNG GZ 2S 2X2 8PLY STRL PK/2

## (undated) DEVICE — SOL NS 500ML

## (undated) DEVICE — ST ACC MICROPUNCTURE STFF .018 ECHO/PLDM/TP 4F/10CM 21G/7CM

## (undated) DEVICE — ADHS SKIN DERMABOND TOP ADVANCED

## (undated) DEVICE — INTRO SHEATH ART/FEM ENGAGE .035 8F12CM

## (undated) DEVICE — KT NEURO CUST

## (undated) DEVICE — UNDYED BRAIDED (POLYGLACTIN 910), SYNTHETIC ABSORBABLE SUTURE: Brand: COATED VICRYL

## (undated) DEVICE — CATH PULM WEDGE PRESS 7F

## (undated) DEVICE — SYR LL TP 10ML STRL

## (undated) DEVICE — ENDOPATH XCEL UNIVERSAL TROCAR STABLILITY SLEEVES: Brand: ENDOPATH XCEL

## (undated) DEVICE — RADIFOCUS TORQUE DEVICE MULTI-TORQUE VISE: Brand: RADIFOCUS TORQUE DEVICE

## (undated) DEVICE — TOWEL,OR,DSP,ST,BLUE,STD,4/PK,20PK/CS: Brand: MEDLINE

## (undated) DEVICE — DEV TISS REMOV MYOSURE REACH

## (undated) DEVICE — MYNXGRIP 6F/7F: Brand: MYNXGRIP

## (undated) DEVICE — Device

## (undated) DEVICE — ENDOPATH XCEL BLADELESS TROCARS WITH STABILITY SLEEVES: Brand: ENDOPATH XCEL

## (undated) DEVICE — CATH ANGIO TRCN NB BCN .038 5F 100CM DAV

## (undated) DEVICE — CATH DIAG EXPO .045 MPA1 5F 100CM

## (undated) DEVICE — PK ANGIO CERBRL RAD 40

## (undated) DEVICE — OSC HYSTEROSCOPY: Brand: MEDLINE INDUSTRIES, INC.

## (undated) DEVICE — KT MANIFLD CARDIAC

## (undated) DEVICE — COPILOT BLEEDBACK CONTROL VALVE: Brand: COPILOT

## (undated) DEVICE — SOL NACL 0.9PCT 1000ML

## (undated) DEVICE — CATH ULTRASND ECHO ACUNAV FOR ACUSON 8F 90CM

## (undated) DEVICE — PRESSURE MONITORING SET: Brand: TRUWAVE

## (undated) DEVICE — GLV SURG BIOGEL LTX PF 6 1/2

## (undated) DEVICE — DISPOSABLE MONOPOLAR ENDOSCOPIC CORD 10 FT. (3M): Brand: KIRWAN

## (undated) DEVICE — GW AMPLTZ SUPERSTIFF STR .035IN 260CM

## (undated) DEVICE — TOTAL TRAY, 16FR 10ML SIL FOLEY, URN: Brand: MEDLINE

## (undated) DEVICE — PINNACLE INTRODUCER SHEATH: Brand: PINNACLE

## (undated) DEVICE — RADIFOCUS GLIDEWIRE: Brand: GLIDEWIRE

## (undated) DEVICE — TBG ART PRESS 60 IN

## (undated) DEVICE — SPECIMEN ADAPTOR: Brand: BEMIS

## (undated) DEVICE — 0.2 MICRON INTRAVENOUS FILTER FOR 96 HOUR USE WITH MICRO-BORE EXTENSION TUBING AN LUER-LOCK ADAPTER: Brand: PALL POSIDYNE® ELD FILTER

## (undated) DEVICE — PROB ABL ENDOMTRL NOVASURE/G4 W/SURESND

## (undated) DEVICE — CATH MIC ECHELON10 STR 2.1F .014IN 155

## (undated) DEVICE — GLV SURG SENSICARE MICRO PF LF 8 STRL

## (undated) DEVICE — ST IV PRI VENOSET NV W/CLAMP 72IN

## (undated) DEVICE — MYNXGRIP 5F: Brand: MYNXGRIP

## (undated) DEVICE — PINNACLE R/O II INTRODUCER SHEATH WITH RADIOPAQUE MARKER: Brand: PINNACLE

## (undated) DEVICE — GLV SURG SIGNATURE ESSENTIAL PF LTX SZ7

## (undated) DEVICE — ST TBG ENDOMAT HYSTSCPY

## (undated) DEVICE — SYR LL 3CC

## (undated) DEVICE — NDL SPINE 22G 31/2IN BLK

## (undated) DEVICE — GOWN,PREVENTION PLUS,XXLARGE,STERILE: Brand: MEDLINE

## (undated) DEVICE — DRSNG SURESITE WNDW 2.38X2.75

## (undated) DEVICE — SUT VIC 0 TIES 18IN J912G

## (undated) DEVICE — 100% SILICONE FOLEY TRAY,16 FR/CH (5.3 MM), 5 ML CATHETER PRE-CONNECTED TO 2000 ML DRAINAGE BAG WITH LUER-LOCK SAMPLING AND ADHESIVE CATHETER SECUREMENT: Brand: DOVER

## (undated) DEVICE — SEAL HYSTERSCOPE/OUTFLOW CHANNEL MYOSURE

## (undated) DEVICE — SKIN PREP TRAY W/CHG: Brand: MEDLINE INDUSTRIES, INC.

## (undated) DEVICE — DESTINATION CAROTID GUIDING SHEATH: Brand: DESTINATION

## (undated) DEVICE — 40585 XL ADVANCED TRENDELENBURG POSITIONING KIT: Brand: 40585 XL ADVANCED TRENDELENBURG POSITIONING KIT

## (undated) DEVICE — SUT VIC 0 CT2 CR8 18IN DYED J727D

## (undated) DEVICE — ENCORE® LATEX ORTHO SIZE 8.5, STERILE LATEX POWDER-FREE SURGICAL GLOVE: Brand: ENCORE

## (undated) DEVICE — BALN SIZING AMPLATZER 2 7F 24MM

## (undated) DEVICE — STPCK 3WY HP ROT

## (undated) DEVICE — DRSNG SURESITE WNDW 4X4.5

## (undated) DEVICE — CATH MIC PHENOM 27 .040IN 15CM

## (undated) DEVICE — GW AMPLATZER SS .035 1.5MM J 260CM

## (undated) DEVICE — ENSEAL TRIO TEMPERATURE CONTOLLED TISSUE SEALING TECHNOLOGY DISPOSABLE TISSUE SEALING DEVICE TAPTRONIC TRIGGER ACTIVATED POWER 3MM CURVED JAW: Brand: ENSEAL

## (undated) DEVICE — DETACH COIL

## (undated) DEVICE — CATH GUIDE NAVIEN DIST STR 5F 8X115

## (undated) DEVICE — SYS DEL AMPLTZ TORQVUE 45D 8FR 80CM

## (undated) DEVICE — GW TORQFLX SS .018IN 40CM

## (undated) DEVICE — DECANT BG O JET

## (undated) DEVICE — BASN GW RINGMASTER